# Patient Record
Sex: MALE | Race: WHITE | Employment: FULL TIME | URBAN - METROPOLITAN AREA
[De-identification: names, ages, dates, MRNs, and addresses within clinical notes are randomized per-mention and may not be internally consistent; named-entity substitution may affect disease eponyms.]

---

## 2018-01-03 PROCEDURE — 99233 SBSQ HOSP IP/OBS HIGH 50: CPT | Mod: GC | Performed by: PEDIATRICS

## 2018-12-22 ENCOUNTER — HOSPITAL ENCOUNTER (EMERGENCY)
Facility: CLINIC | Age: 33
Discharge: HOME | End: 2018-12-22
Attending: EMERGENCY MEDICINE | Admitting: EMERGENCY MEDICINE
Payer: COMMERCIAL

## 2018-12-22 VITALS
HEART RATE: 71 BPM | RESPIRATION RATE: 16 BRPM | WEIGHT: 225 LBS | BODY MASS INDEX: 31.83 KG/M2 | TEMPERATURE: 97.9 F | SYSTOLIC BLOOD PRESSURE: 147 MMHG | OXYGEN SATURATION: 97 % | DIASTOLIC BLOOD PRESSURE: 85 MMHG

## 2018-12-22 DIAGNOSIS — R21 RASH: ICD-10-CM

## 2018-12-22 DIAGNOSIS — R53.1 GENERALIZED WEAKNESS: ICD-10-CM

## 2018-12-22 DIAGNOSIS — S39.012A STRAIN OF LUMBAR REGION, INITIAL ENCOUNTER: ICD-10-CM

## 2018-12-22 DIAGNOSIS — D72.829 LEUKOCYTOSIS, UNSPECIFIED TYPE: ICD-10-CM

## 2018-12-22 LAB
ANION GAP SERPL CALCULATED.3IONS-SCNC: 7 MMOL/L (ref 3–14)
BASOPHILS # BLD AUTO: 0.1 10E9/L (ref 0–0.2)
BASOPHILS NFR BLD AUTO: 0.3 %
BUN SERPL-MCNC: 15 MG/DL (ref 7–30)
CALCIUM SERPL-MCNC: 8.8 MG/DL (ref 8.5–10.1)
CHLORIDE SERPL-SCNC: 102 MMOL/L (ref 94–109)
CO2 SERPL-SCNC: 23 MMOL/L (ref 20–32)
CREAT SERPL-MCNC: 0.86 MG/DL (ref 0.66–1.25)
DIFFERENTIAL METHOD BLD: ABNORMAL
EOSINOPHIL # BLD AUTO: 0.1 10E9/L (ref 0–0.7)
EOSINOPHIL NFR BLD AUTO: 0.5 %
ERYTHROCYTE [DISTWIDTH] IN BLOOD BY AUTOMATED COUNT: 12.8 % (ref 10–15)
FLUAV+FLUBV AG SPEC QL: NEGATIVE
FLUAV+FLUBV AG SPEC QL: NEGATIVE
GFR SERPL CREATININE-BSD FRML MDRD: >90 ML/MIN/{1.73_M2}
GLUCOSE SERPL-MCNC: 122 MG/DL (ref 70–99)
HCT VFR BLD AUTO: 46.2 % (ref 40–53)
HGB BLD-MCNC: 15.4 G/DL (ref 13.3–17.7)
IMM GRANULOCYTES # BLD: 0.1 10E9/L (ref 0–0.4)
IMM GRANULOCYTES NFR BLD: 0.5 %
LACTATE BLD-SCNC: 0.9 MMOL/L (ref 0.7–2)
LYMPHOCYTES # BLD AUTO: 2.2 10E9/L (ref 0.8–5.3)
LYMPHOCYTES NFR BLD AUTO: 11 %
MCH RBC QN AUTO: 28.2 PG (ref 26.5–33)
MCHC RBC AUTO-ENTMCNC: 33.3 G/DL (ref 31.5–36.5)
MCV RBC AUTO: 85 FL (ref 78–100)
MONOCYTES # BLD AUTO: 1.4 10E9/L (ref 0–1.3)
MONOCYTES NFR BLD AUTO: 6.7 %
NEUTROPHILS # BLD AUTO: 16.4 10E9/L (ref 1.6–8.3)
NEUTROPHILS NFR BLD AUTO: 81 %
NRBC # BLD AUTO: 0 10*3/UL
NRBC BLD AUTO-RTO: 0 /100
PLATELET # BLD AUTO: 278 10E9/L (ref 150–450)
POTASSIUM SERPL-SCNC: 3.8 MMOL/L (ref 3.4–5.3)
RBC # BLD AUTO: 5.46 10E12/L (ref 4.4–5.9)
SODIUM SERPL-SCNC: 132 MMOL/L (ref 133–144)
SPECIMEN SOURCE: NORMAL
WBC # BLD AUTO: 20.3 10E9/L (ref 4–11)

## 2018-12-22 PROCEDURE — 25000132 ZZH RX MED GY IP 250 OP 250 PS 637: Performed by: EMERGENCY MEDICINE

## 2018-12-22 PROCEDURE — 36415 COLL VENOUS BLD VENIPUNCTURE: CPT | Performed by: EMERGENCY MEDICINE

## 2018-12-22 PROCEDURE — 85025 COMPLETE CBC W/AUTO DIFF WBC: CPT | Performed by: EMERGENCY MEDICINE

## 2018-12-22 PROCEDURE — 87040 BLOOD CULTURE FOR BACTERIA: CPT | Performed by: EMERGENCY MEDICINE

## 2018-12-22 PROCEDURE — 83605 ASSAY OF LACTIC ACID: CPT | Performed by: EMERGENCY MEDICINE

## 2018-12-22 PROCEDURE — 80048 BASIC METABOLIC PNL TOTAL CA: CPT | Performed by: EMERGENCY MEDICINE

## 2018-12-22 PROCEDURE — 87804 INFLUENZA ASSAY W/OPTIC: CPT | Performed by: EMERGENCY MEDICINE

## 2018-12-22 PROCEDURE — 99283 EMERGENCY DEPT VISIT LOW MDM: CPT

## 2018-12-22 RX ORDER — LIDOCAINE 40 MG/G
CREAM TOPICAL
Status: DISCONTINUED | OUTPATIENT
Start: 2018-12-22 | End: 2018-12-22 | Stop reason: HOSPADM

## 2018-12-22 RX ORDER — IBUPROFEN 200 MG
400 TABLET ORAL ONCE
Status: COMPLETED | OUTPATIENT
Start: 2018-12-22 | End: 2018-12-22

## 2018-12-22 RX ORDER — AMOXICILLIN 500 MG/1
500 CAPSULE ORAL 2 TIMES DAILY
Status: ON HOLD | COMMUNITY
End: 2018-12-29

## 2018-12-22 RX ORDER — METHOCARBAMOL 500 MG/1
1000 TABLET, FILM COATED ORAL 3 TIMES DAILY PRN
Qty: 30 TABLET | Refills: 0 | Status: ON HOLD | OUTPATIENT
Start: 2018-12-22 | End: 2019-01-04

## 2018-12-22 RX ORDER — METHOCARBAMOL 750 MG/1
750 TABLET, FILM COATED ORAL ONCE
Status: COMPLETED | OUTPATIENT
Start: 2018-12-22 | End: 2018-12-22

## 2018-12-22 RX ADMIN — IBUPROFEN 400 MG: 200 TABLET, FILM COATED ORAL at 18:50

## 2018-12-22 RX ADMIN — METHOCARBAMOL 750 MG: 750 TABLET ORAL at 18:50

## 2018-12-22 ASSESSMENT — ENCOUNTER SYMPTOMS
SHORTNESS OF BREATH: 0
CHILLS: 1
WEAKNESS: 1
VOMITING: 0
TROUBLE SWALLOWING: 0
BACK PAIN: 1
MYALGIAS: 1
FEVER: 1
SORE THROAT: 1
ABDOMINAL PAIN: 0
NAUSEA: 0

## 2018-12-22 NOTE — ED AVS SNAPSHOT
[facility] Emergency Department  [address]  [facility] [address]  Phone:  [phone]  Fax:  [phone]                                    Jose Spears   MRN: [id number]    Department:  [facility] Emergency Department   Date of Visit:  12/22/2018           After Visit Summary Signature Page    I have received my discharge instructions, and my questions have been answered. I have discussed any challenges I see with this plan with the nurse or doctor.    ..........................................................................................................................................  Patient/Patient Representative Signature      ..........................................................................................................................................  Patient Representative Print Name and Relationship to Patient    ..................................................               ................................................  Date                                   Time    ..........................................................................................................................................  Reviewed by Signature/Title    ...................................................              ..............................................  Date                                               Time          22EPIC Rev 08/18

## 2018-12-22 NOTE — ED PROVIDER NOTES
History     Chief Complaint:  Fatigue & Back Pain    HPI   Jose Spears is a 33 year old male who presents with fatigue. The patient went to Urgent Care 8 days ago on 12/14 for hives and a sore throat. He tested negative for strep at that time and was given a 5 day course of steroids for the hives. The patient states he feels he never recovered from the symptoms for which he was seen at Urgent Care and currently complains of a sore throat, fevers, chills, generalized weakness, body aches and a return of the hives.     The patient also reports that he was pulling his shorts up after going to the bathroom recently and threw his back out. He currently complains of non-radiating low back pain that is dull in nature and gets worse with movement. He has been taking Tylenol for pain management and notes that his back pain is currently an 8/10 in severity.     Allergies:  Codeine  Morphine     Medications:    Amoxil  Tylenol PO    Past Medical History:    Osteogenesis imperfecta  External Hemorrhoids    Past Surgical History:    Orthopedic Surgery    Family History:    Osteogenesis imperfecta  Diabetes  Hypertension  Cerebrovascular Disease  Colorectal Cancer    Social History:  Smoking Status: Current Smoker  Alcohol Use: Occasionally  Patient presents with mom.  Marital Status:  Single     Review of Systems   Constitutional: Positive for chills and fever.   HENT: Positive for sore throat. Negative for trouble swallowing.    Respiratory: Negative for shortness of breath.    Gastrointestinal: Negative for abdominal pain, nausea and vomiting.   Musculoskeletal: Positive for back pain and myalgias.   Skin: Positive for rash.   Neurological: Positive for weakness (generalized).   All other systems reviewed and are negative.    Physical Exam   First Vitals:  BP: 147/85  Pulse: 71  Heart Rate: 89  Temp: 97.3  F (36.3  C)  Resp: 16  Weight: 102.1 kg (225 lb)  SpO2: 95 %      Physical Exam  General: The patient is alert,  in no respiratory distress.    HENT: Mucous membranes moist.    Cardiovascular: Regular rate and rhythm. Good pulses in all four extremities. Normal capillary refill and skin turgor.     Respiratory: Lungs are clear. No nasal flaring. No retractions. No wheezing, no crackles.    Gastrointestinal: Abdomen soft. No guarding, no rebound. No palpable hernias.     Musculoskeletal: No gross deformity.     Skin: No petechiae.  Warm, nonspecific papular erythema over left forearm.    Neurologic: The patient is alert and oriented x3. GCS 15. No testable cranial nerve deficit. Follows commands with clear and appropriate speech. Gives appropriate answers. Good strength in all extremities. No gross neurologic deficit. Gross sensation intact. Pupils are round and reactive. No meningismus.     Lymphatic: No cervical adenopathy. No lower extremity swelling.    Psychiatric: The patient is non-tearful.    Emergency Department Course     Laboratory:    1906: Lactic acid whole blood: 0.9    Influenza A/B antigen: Both negative    BMP: Na 132 (L), Glucose 122 (H), o/w AWNL (Creatinine 0.86)    CBC: WBC 20.3 (H), o/w AWNL (HGB 15.4, )    Blood Culture x2: Pending    Interventions:    1850: ibuprofen tablet 400 mg PO  1850: Robaxin 750 mg PO    Emergency Department Course:  Past medical records, nursing notes, and vitals reviewed.  1828: I performed an exam of the patient and obtained history, as documented above.     Labs were sent.    1938: I rechecked the patient. Findings and plan explained to the Patient. Patient discharged home with instructions regarding supportive care, medications, and reasons to return. The importance of close follow-up was reviewed.     Impression & Plan      Medical Decision Making:  The patient reports he doesn't feel well. He has pain in multiple areas and there has been a rash, which he did attribute to hives, but the patient had been previously evaluated mid-month and had been started on steroids.  I think he is still having some effects from that with the pain and symptoms of fatigue. His white count did come back elevated which I think is likely secondary to that, but I did check a lactic acid level which was normal. I sent off blood cultures. He does not have signs suggesting UTI. I do not find it likely he has meningitis or a blood born infection, but I am covering all of the bases by sending off the blood cultures. The patient is otherwise stable. He has injured his back by bending over as well. There is no focal bony tenderness to suggest discitis. I will refer him to [facility] and wrote him for muscle relaxant. Regarding the rash, I do not see any overt signs of hives, but did order him for steroids and given that the weekend and [place] is coming up, I told him to avoid using the steroids unless the rash worsens, otherwise use the muscle relaxant, ibuprofen and follow up with [facility] for [facility] as well as a primary care doctor for recheck. He will receive a call if his cultures grow something and he should return if he worsens. The patient is otherwise stable for discharge.     Diagnosis:    ICD-10-CM    1. Generalized weakness R53.1 CBC with platelets differential     Basic metabolic panel     Influenza A/B antigen     Lactic acid whole blood     Blood culture     Blood culture   2. Rash R21    3. Strain of lumbar region, initial encounter S39.012A    4. Leukocytosis, unspecified type D72.829        Disposition:  Discharged to home.    Discharge Medications:     Medication List      Started    methocarbamol 500 MG tablet  Commonly known as:  ROBAXIN  1,000 mg, Oral, 3 TIMES DAILY PRN              Cathie Srivastava  12/22/2018   [facility] EMERGENCY DEPARTMENT  I, Cathie Srivastava, am serving as a scribe at 6:18 PM on 12/22/2018 to document services personally performed by Jose Daniel Haile MD based on my observations and the provider's  statements to me.        Jose Daniel Haile MD  12/22/18 [id number]

## 2018-12-22 NOTE — ED TRIAGE NOTES
Pt c/o fatigue, chills, sore throat, body aches for 4 days.  Pt also threw out back today.  Pt on amoxicillin for root canal earlier this week.

## 2018-12-23 ENCOUNTER — HOSPITAL ENCOUNTER (INPATIENT)
Facility: CLINIC | Age: 33
LOS: 6 days | Discharge: HOME | DRG: 546 | End: 2018-12-29
Attending: EMERGENCY MEDICINE | Admitting: PSYCHIATRY & NEUROLOGY
Payer: COMMERCIAL

## 2018-12-23 ENCOUNTER — APPOINTMENT (OUTPATIENT)
Dept: CT IMAGING | Facility: CLINIC | Age: 33
DRG: 546 | End: 2018-12-23
Attending: EMERGENCY MEDICINE
Payer: COMMERCIAL

## 2018-12-23 ENCOUNTER — APPOINTMENT (OUTPATIENT)
Dept: GENERAL RADIOLOGY | Facility: CLINIC | Age: 33
DRG: 546 | End: 2018-12-23
Attending: EMERGENCY MEDICINE
Payer: COMMERCIAL

## 2018-12-23 ENCOUNTER — APPOINTMENT (OUTPATIENT)
Dept: MRI IMAGING | Facility: CLINIC | Age: 33
DRG: 546 | End: 2018-12-23
Attending: EMERGENCY MEDICINE
Payer: COMMERCIAL

## 2018-12-23 DIAGNOSIS — R21 RASH: Primary | ICD-10-CM

## 2018-12-23 DIAGNOSIS — R53.1 PROGRESSIVE FOCAL MOTOR WEAKNESS: ICD-10-CM

## 2018-12-23 LAB
ALBUMIN SERPL-MCNC: 3.4 G/DL (ref 3.4–5)
ALBUMIN UR-MCNC: NEGATIVE MG/DL
ALP SERPL-CCNC: 108 U/L (ref 40–150)
ALT SERPL W P-5'-P-CCNC: 63 U/L (ref 0–70)
AMPHETAMINES UR QL SCN: NEGATIVE
ANION GAP SERPL CALCULATED.3IONS-SCNC: 11 MMOL/L (ref 3–14)
APPEARANCE UR: CLEAR
AST SERPL W P-5'-P-CCNC: 27 U/L (ref 0–45)
BARBITURATES UR QL: NEGATIVE
BASOPHILS # BLD AUTO: 0 10E9/L (ref 0–0.2)
BASOPHILS NFR BLD AUTO: 0.1 %
BENZODIAZ UR QL: NEGATIVE
BILIRUB SERPL-MCNC: 1.1 MG/DL (ref 0.2–1.3)
BILIRUB UR QL STRIP: NEGATIVE
BUN SERPL-MCNC: 13 MG/DL (ref 7–30)
CALCIUM SERPL-MCNC: 8.9 MG/DL (ref 8.5–10.1)
CANNABINOIDS UR QL SCN: NEGATIVE
CHLORIDE SERPL-SCNC: 99 MMOL/L (ref 94–109)
CK SERPL-CCNC: 46 U/L (ref 30–300)
CO2 SERPL-SCNC: 23 MMOL/L (ref 20–32)
COCAINE UR QL: NEGATIVE
COLOR UR AUTO: YELLOW
CREAT SERPL-MCNC: 0.73 MG/DL (ref 0.66–1.25)
CREAT SERPL-MCNC: 0.8 MG/DL (ref 0.66–1.25)
DIFFERENTIAL METHOD BLD: ABNORMAL
EOSINOPHIL # BLD AUTO: 0.1 10E9/L (ref 0–0.7)
EOSINOPHIL NFR BLD AUTO: 0.6 %
ERYTHROCYTE [DISTWIDTH] IN BLOOD BY AUTOMATED COUNT: 13 % (ref 10–15)
ETHANOL SERPL-MCNC: <0.01 G/DL
ETHANOL UR QL SCN: NEGATIVE
GFR SERPL CREATININE-BSD FRML MDRD: >90 ML/MIN/{1.73_M2}
GFR SERPL CREATININE-BSD FRML MDRD: >90 ML/MIN/{1.73_M2}
GLUCOSE SERPL-MCNC: 100 MG/DL (ref 70–99)
GLUCOSE UR STRIP-MCNC: NEGATIVE MG/DL
HCT VFR BLD AUTO: 47.4 % (ref 40–53)
HETEROPH AB SER QL: NEGATIVE
HGB BLD-MCNC: 15.9 G/DL (ref 13.3–17.7)
HGB UR QL STRIP: NEGATIVE
IMM GRANULOCYTES # BLD: 0 10E9/L (ref 0–0.4)
IMM GRANULOCYTES NFR BLD: 0.2 %
KETONES UR STRIP-MCNC: NEGATIVE MG/DL
LACTATE BLD-SCNC: 1.7 MMOL/L (ref 0.7–2)
LEUKOCYTE ESTERASE UR QL STRIP: NEGATIVE
LYMPHOCYTES # BLD AUTO: 1.4 10E9/L (ref 0.8–5.3)
LYMPHOCYTES NFR BLD AUTO: 7.4 %
MAGNESIUM SERPL-MCNC: 2.1 MG/DL (ref 1.6–2.3)
MCH RBC QN AUTO: 29 PG (ref 26.5–33)
MCHC RBC AUTO-ENTMCNC: 33.5 G/DL (ref 31.5–36.5)
MCV RBC AUTO: 86 FL (ref 78–100)
MONOCYTES # BLD AUTO: 1.2 10E9/L (ref 0–1.3)
MONOCYTES NFR BLD AUTO: 6.5 %
NEUTROPHILS # BLD AUTO: 15.7 10E9/L (ref 1.6–8.3)
NEUTROPHILS NFR BLD AUTO: 85.2 %
NITRATE UR QL: NEGATIVE
NRBC # BLD AUTO: 0 10*3/UL
NRBC BLD AUTO-RTO: 0 /100
OPIATES UR QL SCN: NEGATIVE
PH UR STRIP: 6 PH (ref 5–7)
PHOSPHATE SERPL-MCNC: 2.5 MG/DL (ref 2.5–4.5)
PLATELET # BLD AUTO: 216 10E9/L (ref 150–450)
PLATELET # BLD AUTO: 250 10E9/L (ref 150–450)
POTASSIUM SERPL-SCNC: 3.5 MMOL/L (ref 3.4–5.3)
PROT SERPL-MCNC: 7.9 G/DL (ref 6.8–8.8)
RBC # BLD AUTO: 5.49 10E12/L (ref 4.4–5.9)
RBC #/AREA URNS AUTO: 1 /HPF (ref 0–2)
SODIUM SERPL-SCNC: 132 MMOL/L (ref 133–144)
SOURCE: NORMAL
SP GR UR STRIP: 1 (ref 1–1.03)
TSH SERPL DL<=0.005 MIU/L-ACNC: 2.08 MU/L (ref 0.4–4)
UROBILINOGEN UR STRIP-MCNC: 2 MG/DL (ref 0–2)
WBC # BLD AUTO: 18.4 10E9/L (ref 4–11)
WBC #/AREA URNS AUTO: 1 /HPF (ref 0–5)

## 2018-12-23 PROCEDURE — 81001 URINALYSIS AUTO W/SCOPE: CPT | Performed by: EMERGENCY MEDICINE

## 2018-12-23 PROCEDURE — 25500064 ZZH RX 255 OP 636: Performed by: RADIOLOGY

## 2018-12-23 PROCEDURE — 12000001 ZZH R&B MED SURG/OB UMMC

## 2018-12-23 PROCEDURE — 25000128 H RX IP 250 OP 636: Performed by: STUDENT IN AN ORGANIZED HEALTH CARE EDUCATION/TRAINING PROGRAM

## 2018-12-23 PROCEDURE — 80320 DRUG SCREEN QUANTALCOHOLS: CPT | Performed by: EMERGENCY MEDICINE

## 2018-12-23 PROCEDURE — 25000128 H RX IP 250 OP 636: Performed by: EMERGENCY MEDICINE

## 2018-12-23 PROCEDURE — 99285 EMERGENCY DEPT VISIT HI MDM: CPT | Mod: 25 | Performed by: EMERGENCY MEDICINE

## 2018-12-23 PROCEDURE — A9585 GADOBUTROL INJECTION: HCPCS | Performed by: RADIOLOGY

## 2018-12-23 PROCEDURE — 83735 ASSAY OF MAGNESIUM: CPT | Performed by: EMERGENCY MEDICINE

## 2018-12-23 PROCEDURE — 82784 ASSAY IGA/IGD/IGG/IGM EACH: CPT | Performed by: STUDENT IN AN ORGANIZED HEALTH CARE EDUCATION/TRAINING PROGRAM

## 2018-12-23 PROCEDURE — 70450 CT HEAD/BRAIN W/O DYE: CPT

## 2018-12-23 PROCEDURE — 86308 HETEROPHILE ANTIBODY SCREEN: CPT | Performed by: EMERGENCY MEDICINE

## 2018-12-23 PROCEDURE — 96375 TX/PRO/DX INJ NEW DRUG ADDON: CPT | Performed by: EMERGENCY MEDICINE

## 2018-12-23 PROCEDURE — 86431 RHEUMATOID FACTOR QUANT: CPT | Performed by: STUDENT IN AN ORGANIZED HEALTH CARE EDUCATION/TRAINING PROGRAM

## 2018-12-23 PROCEDURE — 85049 AUTOMATED PLATELET COUNT: CPT | Performed by: STUDENT IN AN ORGANIZED HEALTH CARE EDUCATION/TRAINING PROGRAM

## 2018-12-23 PROCEDURE — 85025 COMPLETE CBC W/AUTO DIFF WBC: CPT | Performed by: EMERGENCY MEDICINE

## 2018-12-23 PROCEDURE — 72158 MRI LUMBAR SPINE W/O & W/DYE: CPT

## 2018-12-23 PROCEDURE — 93005 ELECTROCARDIOGRAM TRACING: CPT

## 2018-12-23 PROCEDURE — 86334 IMMUNOFIX E-PHORESIS SERUM: CPT | Performed by: STUDENT IN AN ORGANIZED HEALTH CARE EDUCATION/TRAINING PROGRAM

## 2018-12-23 PROCEDURE — 80053 COMPREHEN METABOLIC PANEL: CPT | Performed by: EMERGENCY MEDICINE

## 2018-12-23 PROCEDURE — 36415 COLL VENOUS BLD VENIPUNCTURE: CPT | Performed by: EMERGENCY MEDICINE

## 2018-12-23 PROCEDURE — 87040 BLOOD CULTURE FOR BACTERIA: CPT | Performed by: EMERGENCY MEDICINE

## 2018-12-23 PROCEDURE — 25000132 ZZH RX MED GY IP 250 OP 250 PS 637: Performed by: EMERGENCY MEDICINE

## 2018-12-23 PROCEDURE — 84100 ASSAY OF PHOSPHORUS: CPT | Performed by: EMERGENCY MEDICINE

## 2018-12-23 PROCEDURE — 36415 COLL VENOUS BLD VENIPUNCTURE: CPT | Performed by: STUDENT IN AN ORGANIZED HEALTH CARE EDUCATION/TRAINING PROGRAM

## 2018-12-23 PROCEDURE — 80307 DRUG TEST PRSMV CHEM ANLYZR: CPT | Performed by: EMERGENCY MEDICINE

## 2018-12-23 PROCEDURE — 71046 X-RAY EXAM CHEST 2 VIEWS: CPT

## 2018-12-23 PROCEDURE — 82550 ASSAY OF CK (CPK): CPT | Performed by: EMERGENCY MEDICINE

## 2018-12-23 PROCEDURE — 82565 ASSAY OF CREATININE: CPT | Performed by: STUDENT IN AN ORGANIZED HEALTH CARE EDUCATION/TRAINING PROGRAM

## 2018-12-23 PROCEDURE — 93010 ELECTROCARDIOGRAM REPORT: CPT | Performed by: INTERNAL MEDICINE

## 2018-12-23 PROCEDURE — 84443 ASSAY THYROID STIM HORMONE: CPT | Performed by: EMERGENCY MEDICINE

## 2018-12-23 PROCEDURE — 96361 HYDRATE IV INFUSION ADD-ON: CPT | Performed by: EMERGENCY MEDICINE

## 2018-12-23 PROCEDURE — 99285 EMERGENCY DEPT VISIT HI MDM: CPT | Mod: Z6 | Performed by: EMERGENCY MEDICINE

## 2018-12-23 PROCEDURE — 96365 THER/PROPH/DIAG IV INF INIT: CPT | Mod: 59 | Performed by: EMERGENCY MEDICINE

## 2018-12-23 PROCEDURE — 83605 ASSAY OF LACTIC ACID: CPT | Performed by: EMERGENCY MEDICINE

## 2018-12-23 RX ORDER — AMOXICILLIN 250 MG
1 CAPSULE ORAL 2 TIMES DAILY PRN
Status: DISCONTINUED | OUTPATIENT
Start: 2018-12-23 | End: 2018-12-29 | Stop reason: HOSPADM

## 2018-12-23 RX ORDER — LIDOCAINE HYDROCHLORIDE AND EPINEPHRINE 10; 10 MG/ML; UG/ML
1 INJECTION, SOLUTION INFILTRATION; PERINEURAL ONCE
Status: DISCONTINUED | OUTPATIENT
Start: 2018-12-23 | End: 2018-12-23 | Stop reason: CLARIF

## 2018-12-23 RX ORDER — NALOXONE HYDROCHLORIDE 0.4 MG/ML
.1-.4 INJECTION, SOLUTION INTRAMUSCULAR; INTRAVENOUS; SUBCUTANEOUS
Status: DISCONTINUED | OUTPATIENT
Start: 2018-12-23 | End: 2018-12-29 | Stop reason: HOSPADM

## 2018-12-23 RX ORDER — ACETAMINOPHEN 325 MG/1
650 TABLET ORAL EVERY 4 HOURS PRN
Status: DISCONTINUED | OUTPATIENT
Start: 2018-12-23 | End: 2018-12-23

## 2018-12-23 RX ORDER — ONDANSETRON 2 MG/ML
4 INJECTION INTRAMUSCULAR; INTRAVENOUS EVERY 6 HOURS PRN
Status: DISCONTINUED | OUTPATIENT
Start: 2018-12-23 | End: 2018-12-29 | Stop reason: HOSPADM

## 2018-12-23 RX ORDER — ACETAMINOPHEN 325 MG/1
650 TABLET ORAL EVERY 4 HOURS PRN
Status: DISCONTINUED | OUTPATIENT
Start: 2018-12-23 | End: 2018-12-29 | Stop reason: HOSPADM

## 2018-12-23 RX ORDER — SODIUM CHLORIDE 9 MG/ML
1000 INJECTION, SOLUTION INTRAVENOUS CONTINUOUS
Status: DISCONTINUED | OUTPATIENT
Start: 2018-12-23 | End: 2018-12-23

## 2018-12-23 RX ORDER — AMOXICILLIN 250 MG
2 CAPSULE ORAL 2 TIMES DAILY PRN
Status: DISCONTINUED | OUTPATIENT
Start: 2018-12-23 | End: 2018-12-29 | Stop reason: HOSPADM

## 2018-12-23 RX ORDER — GADOBUTROL 604.72 MG/ML
0.1 INJECTION INTRAVENOUS ONCE
Status: COMPLETED | OUTPATIENT
Start: 2018-12-23 | End: 2018-12-23

## 2018-12-23 RX ORDER — LORAZEPAM 2 MG/ML
1 INJECTION INTRAMUSCULAR ONCE
Status: COMPLETED | OUTPATIENT
Start: 2018-12-23 | End: 2018-12-23

## 2018-12-23 RX ORDER — CEFTRIAXONE 2 G/1
2 INJECTION, POWDER, FOR SOLUTION INTRAMUSCULAR; INTRAVENOUS ONCE
Status: COMPLETED | OUTPATIENT
Start: 2018-12-23 | End: 2018-12-23

## 2018-12-23 RX ORDER — ONDANSETRON 4 MG/1
4 TABLET, ORALLY DISINTEGRATING ORAL EVERY 6 HOURS PRN
Status: DISCONTINUED | OUTPATIENT
Start: 2018-12-23 | End: 2018-12-29 | Stop reason: HOSPADM

## 2018-12-23 RX ORDER — POLYETHYLENE GLYCOL 3350 17 G/17G
17 POWDER, FOR SOLUTION ORAL DAILY PRN
Status: DISCONTINUED | OUTPATIENT
Start: 2018-12-23 | End: 2018-12-29 | Stop reason: HOSPADM

## 2018-12-23 RX ORDER — ACETAMINOPHEN 325 MG/1
650 TABLET ORAL ONCE
Status: COMPLETED | OUTPATIENT
Start: 2018-12-23 | End: 2018-12-23

## 2018-12-23 RX ORDER — IBUPROFEN 600 MG/1
600 TABLET, FILM COATED ORAL ONCE
Status: COMPLETED | OUTPATIENT
Start: 2018-12-23 | End: 2018-12-23

## 2018-12-23 RX ADMIN — VANCOMYCIN HYDROCHLORIDE 2500 MG: 1 INJECTION, POWDER, LYOPHILIZED, FOR SOLUTION INTRAVENOUS at 23:57

## 2018-12-23 RX ADMIN — GADOBUTROL 10 ML: 604.72 INJECTION INTRAVENOUS at 18:14

## 2018-12-23 RX ADMIN — CEFTRIAXONE SODIUM 2 G: 2 INJECTION, POWDER, FOR SOLUTION INTRAMUSCULAR; INTRAVENOUS at 20:50

## 2018-12-23 RX ADMIN — SODIUM CHLORIDE 1000 ML: 9 INJECTION, SOLUTION INTRAVENOUS at 16:59

## 2018-12-23 RX ADMIN — SODIUM CHLORIDE 1000 ML: 9 INJECTION, SOLUTION INTRAVENOUS at 12:08

## 2018-12-23 RX ADMIN — ACETAMINOPHEN 650 MG: 325 TABLET, FILM COATED ORAL at 13:44

## 2018-12-23 RX ADMIN — IBUPROFEN 600 MG: 600 TABLET ORAL at 19:27

## 2018-12-23 RX ADMIN — ACYCLOVIR SODIUM 1000 MG: 1000 INJECTION, SOLUTION INTRAVENOUS at 21:50

## 2018-12-23 RX ADMIN — LORAZEPAM 1 MG: 2 INJECTION, SOLUTION INTRAMUSCULAR; INTRAVENOUS at 17:53

## 2018-12-23 RX ADMIN — ACETAMINOPHEN 650 MG: 325 TABLET, FILM COATED ORAL at 16:59

## 2018-12-23 RX ADMIN — ENOXAPARIN SODIUM 40 MG: 100 INJECTION SUBCUTANEOUS at 23:57

## 2018-12-23 ASSESSMENT — ENCOUNTER SYMPTOMS
DIAPHORESIS: 1
FATIGUE: 1
NUMBNESS: 1
BACK PAIN: 1
MYALGIAS: 1
CONFUSION: 0
VOMITING: 0
DIARRHEA: 0
WEAKNESS: 1
NAUSEA: 0
LIGHT-HEADEDNESS: 1
SORE THROAT: 1
RHINORRHEA: 0
JOINT SWELLING: 0
BLOOD IN STOOL: 0
NECK STIFFNESS: 1
SPEECH DIFFICULTY: 0
COUGH: 0
ARTHRALGIAS: 0
HEADACHES: 1
ABDOMINAL PAIN: 0
ABDOMINAL DISTENTION: 0
DYSURIA: 0
NECK PAIN: 1
FEVER: 1
SHORTNESS OF BREATH: 0

## 2018-12-23 ASSESSMENT — MIFFLIN-ST. JEOR: SCORE: 2008.59

## 2018-12-23 NOTE — ED TRIAGE NOTES
"ED TRIAGE    Medical / Trauma C/o:  33-yr male patient - presenting to ED for eval of generalized weakness / joint pain, fevers; x 3 days; also \"threw out\" his back yesterday, while getting dressed.  Ambulating is difficult for patient.    Duration of C/o:  3 days    Contributing Factors / Concerning HX:  See HX    Significant Med's / Tx's:  See med's    Febrile / Afebrile:  99.7F, at triage    Patient Vitals for the past 24 hrs:   BP Temp Temp src Pulse Heart Rate Resp SpO2 Height Weight   12/23/18 1107 120/71 99.7  F (37.6  C) Oral 79 79 18 97 % 1.829 m (6') 102.1 kg (225 lb)       Javier Lobo  December 23, 2018  11:09 AM  "
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
## 2018-12-23 NOTE — ED NOTES
[facility], [place]   ED Nurse to Floor Handoff     Jose Spears is a 33 year old male who speaks English and lives alone,  in a home  They arrived in the ED by car from home    ED Chief Complaint: Generalized Weakness (x 3, ,febrile); Gait Problem (x 3); and Back Pain    ED Dx;   Final diagnoses:   Progressive focal motor weakness         Needed?: No    Allergies:   Allergies   Allergen Reactions     Codeine Nausea and Vomiting     Morphine Nausea and Vomiting   .  Past Medical Hx:   Past Medical History:   Diagnosis Date     Osteogenesis imperfecta       Baseline Mental status: WDL  Current Mental Status changes: at basesline    Infection present or suspected this encounter: cultures pending  Sepsis suspected: No  Isolation type: No active isolations     Activity level - Baseline/Home:  Independent  Activity Level - Current:   Stand with Assist    Bariatric equipment needed?: No    In the ED these meds were given:   Medications   0.9% sodium chloride BOLUS (0 mLs Intravenous Stopped 12/23/18 1355)     Followed by   sodium chloride 0.9% infusion (not administered)   acetaminophen (TYLENOL) tablet 650 mg (650 mg Oral Given 12/23/18 1344)   lidocaine 1% with EPINEPHrine 1:100,000 injection 1 mL (not administered)       Drips running?  No    Home pump  No    Current LDAs  Peripheral IV 12/23/18 Right Upper forearm (Active)   Site Assessment WDL 12/23/2018 12:01 PM   Line Status Saline locked 12/23/2018 12:01 PM   Phlebitis Scale 0-->no symptoms 12/23/2018 12:01 PM   Infiltration Scale 0 12/23/2018 12:01 PM   Number of days: 0       Labs results:   Labs Ordered and Resulted from Time of ED Arrival Up to the Time of Departure from the ED   CBC WITH PLATELETS DIFFERENTIAL - Abnormal; Notable for the following components:       Result Value    WBC 18.4 (*)     Absolute Neutrophil 15.7 (*)     All other components within normal limits   COMPREHENSIVE METABOLIC PANEL - Abnormal;  Notable for the following components:    Sodium 132 (*)     Glucose 100 (*)     All other components within normal limits   ROUTINE UA WITH MICROSCOPIC REFLEX TO CULTURE   TSH WITH FREE T4 REFLEX   CK TOTAL   MAGNESIUM   PHOSPHORUS   LACTIC ACID WHOLE BLOOD   MONONUCLEOSIS SCREEN   OLIGOCLONAL BANDING   PROTEIN IMMUNOFIXATION SERUM   VASCULITIS PANEL   SSA RO ISAAC ANTIBODY IGG   SSB LA ISAAC ANTIBODY IGG   ANTI NUCLEAR DANIA IGG BY IFA WITH REFLEX   RHEUMATOID FACTOR   GANGLIOSIDE ANTIBODIES IGG AND IGM   PERIPHERAL IV CATHETER   BLOOD CULTURE   BLOOD CULTURE   GLUCOSE CSF   PROTEIN TOTAL CSF   GRAM STAIN   CSF CULTURE AEROBIC BACTERIAL   CELL COUNT WITH DIFFERENTIAL CSF   VDRL CSF   ANAEROBIC CSF CULTURE   ENTEROVIRUS PCR CSF   HSV TYPES 1 AND 2 QUALITATIVE PCR CSF   H INFLUENZAE ANTIGEN   LYME IGG AND IGM CSF IMMUNOBLOT   VARICELLA ZOSTER DNA PCR CSF OR SKIN SWAB       Imaging Studies: No results found for this or any previous visit (from the past 24 hour(s)).    Recent vital signs:   /76   Pulse 83   Temp 100  F (37.8  C)   Resp 18   Ht 1.829 m (6')   Wt 102.1 kg (225 lb)   SpO2 96%   BMI 30.52 kg/m      Cardiac Rhythm: Normal Sinus  Pt needs tele? No  Skin/wound Issues: None    Code Status: not on file    Pain control: fair    Nausea control: pt had none    Abnormal labs/tests/findings requiring intervention:     Family present during ED course? Yes   Family Comments/Social Situation comments: mother at bedside    Tasks needing completion: None    Marshall Gomez, RN  [phone] [facility]

## 2018-12-23 NOTE — ED NOTES
"Mother of patient unhappy with care. Mother of patient walking up to HUC desk stating \" My son is sick and nobody is helping him\". Patient watching TV at this time VSS A&Ox4. DR Rangel aware of situation.     "

## 2018-12-23 NOTE — H&P
"[facility]  General Neurology Admission  12/23/2018      Jose Spears MRN# [id number]   YOB: 1985 Age: 33 year old      Date of Admission:  12/23/2018    Primary care provider:   No Ref-Primary, Physician           History of Present Illness:       CC: generalized weakness    History is obtained from the patient and/or family and medical record.      Jose Spears is a 33 year old male with history of osteogenesis imperfecta who presented to the emergency department with generalized weakness, gait instability, back pain.  Neurology is consulted for assistance and workup.  History is obtained from chart review, patient, patient's family.    Mr. Spears reports that he had sudden onset of generalized weakness on Wednesday morning after waking up.  He does not describe that it started at one region of his body, rather states that he had weakness throughout his body immediately upon awakening.  In addition to this he was very sore throughout.  He cannot state whether the weakness and soreness started distally and progressed up his extremities, rather stating that it was \"all over.\"  Since onset he reports that he has had worsening of both the pain in the weakness, to the point where he cannot walk today and he is needing to pick his legs up with his arms to get out of bed.    Of note, he was seen in urgent care for your urticaria and was started on prednisone.  The course of this medication ended on the day that he developed his weakness.    Review of systems reveals complaints of dark urine, mild headache, dizziness, sore throat, neck stiffness, and gait instability, diaphoresis, and fever.  Is unclear whether or not he has true numbness he at one point stated he did and then at other said he did not.    He was seen in the [facility] emergency department 12/22 for evaluation of fatigue and was written a prescription for muscle relaxant due to back pain.        "    Past Medical History:     Past Medical History:   Diagnosis Date     Osteogenesis imperfecta       Past Surgical History:   Procedure Laterality Date     ORTHOPEDIC SURGERY                Social History:     Social History     Socioeconomic History     Marital status: Single     Spouse name: Not on file     Number of children: Not on file     Years of education: Not on file     Highest education level: Not on file   Social Needs     Financial resource strain: Not on file     Food insecurity - worry: Not on file     Food insecurity - inability: Not on file     Transportation needs - medical: Not on file     Transportation needs - non-medical: Not on file   Occupational History     Not on file   Tobacco Use     Smoking status: Current Every Day Smoker     Packs/day: 1.00     Types: Cigarettes     Smokeless tobacco: Never Used   Substance and Sexual Activity     Alcohol use: Yes     Comment: occasionally     Drug use: No     Sexual activity: Yes     Partners: Female   Other Topics Concern     Parent/sibling w/ CABG, MI or angioplasty before 65F 55M? No   Social History Narrative     Not on file             Family History:     Family History   Problem Relation Age of Onset     Genetic Disorder Father         OI     Diabetes Maternal Grandmother      Hypertension Maternal Grandmother      Cerebrovascular Disease Maternal Grandmother      Diabetes Maternal Aunt      Cancer - colorectal Maternal Grandfather             Allergies:      Allergies   Allergen Reactions     Codeine Nausea and Vomiting     Morphine Nausea and Vomiting             Medications:     Prescription Medications as of 12/23/2018       Rx Number Disp Refills Start End Last Dispensed Date Next Fill Date Owning Pharmacy    Acetaminophen (TYLENOL PO)            Class: Historical    Route: Oral    amoxicillin (AMOXIL) 500 MG capsule            Sig: Take 500 mg by mouth 2 times daily    Class: Historical    Route: Oral    methocarbamol (ROBAXIN) 500 MG  "tablet  30 tablet 0 2018       Sig: Take 2 tablets (1,000 mg) by mouth 3 times daily as needed    Class: Local Print    Route: Oral      Hospital Medications as of 2018       Dose Frequency Start End    0.9% sodium chloride BOLUS 1,000 mL ONCE 2018    Sig: Inject 1,000 mLs into the vein once    Class: E-Prescribe    Route: Intravenous    Linked Group 1:  \"Followed by\" Linked Group Details        acetaminophen (TYLENOL) tablet 650 mg 650 mg EVERY 4 HOURS PRN 2018     Sig: Take 2 tablets (650 mg) by mouth every 4 hours as needed for mild pain or fever    Class: E-Prescribe    Notes to Pharmacy: DO NOT USE THIS FIELD FOR ADMIN INSTRUCTIONS; INFORMATION DOES NOT SHOW ON MAR. USE THE FIELD ABOVE MARKED ADMIN INSTRUCTIONS    Route: Oral    ibuprofen (ADVIL/MOTRIN) tablet 400 mg 400 mg ONCE 2018    Sig: Take 2 tablets (400 mg) by mouth once    Class: E-Prescribe    Route: Oral    methocarbamol (ROBAXIN) tablet 750 mg 750 mg ONCE 2018    Sig: Take 1 tablet (750 mg) by mouth once    Class: E-Prescribe    Route: Oral    sodium chloride 0.9% infusion 1,000 mL CONTINUOUS 2018     Sig: Inject 1,000 mLs into the vein continuous    Class: E-Prescribe    Route: Intravenous    Linked Group 1:  \"Followed by\" Linked Group Details        0.9% sodium chloride BOLUS (Discontinued) 1,000 mL ONCE 2018    Sig: Inject 1,000 mLs into the vein once    Class: E-Prescribe    Route: Intravenous    lidocaine (LMX4) cream (Discontinued)  EVERY 1 HOUR PRN 2018    Sig: Apply topically every hour as needed for pain (with VAD insertion or accessing implanted port.)    Class: E-Prescribe    Route: Topical    Reason for Discontinue: Patient Discharge    lidocaine 1 % 1 mL (Discontinued) 1 mL EVERY 1 HOUR PRN 2018    Si mL by Other route every hour as needed (mild pain with VAD insertion or accessing implanted port) "    Class: E-Prescribe    Route: Other    Reason for Discontinue: Patient Discharge    sodium chloride (PF) 0.9% PF flush 3 mL (Discontinued) 3 mL EVERY 1 HOUR PRN 12/22/2018 12/22/2018    Sig: 3 mLs by Intracatheter route every hour as needed for line flush (for peripheral IV flush post IV meds)    Class: E-Prescribe    Route: Intracatheter    Reason for Discontinue: Patient Discharge    sodium chloride (PF) 0.9% PF flush 3 mL (Discontinued) 3 mL EVERY 8 HOURS 12/22/2018 12/22/2018    Sig: 3 mLs by Intracatheter route every 8 hours    Class: E-Prescribe    Route: Intracatheter    Reason for Discontinue: Patient Discharge                Review of Systems:   The 10 point Review of Systems is negative other than noted in the HPI         Physical Exam:   /76   Pulse 83   Temp 100  F (37.8  C)   Resp 18   Ht 1.829 m (6')   Wt 102.1 kg (225 lb)   SpO2 96%   BMI 30.52 kg/m       General: NAD, cooperative  HEENT: sclera anicteric  Resp: CTAB, no w/r/r  CVS: RRR, no m/r/g  Skin: warm and dry  Extremities: No edema    Neurologic:  Mental Status: Fully alert, attentive and oriented. Speech fluent without errors.   Cranial Nerves: Visual fields intact. PERRL. EOMI with normal smooth pursuit. Facial movements symmetric. Hearing intact to conversation. Palate elevation symmetric, uvula midline. No dysarthria. Shoulder shrug strong bilaterally. Tongue protrusion midline.  Motor: No abnormal movements. Normal tone throughout.    LUE: 4+/5 shoulder abductors, tricpes, 4/5 biceps and wrist extensors and finger abudctors   RUE: 4+/5 shoulder abductors, biceps, triceps; 4/5 wrist extensors, finger abudcotrs   LLE: 4-/5 hip flexors, knee flexors, knee extensors; 4/5 dorsiflexors, plantarflexors   RLE: 4/5 hip flexors, knee extensors; 4+/5 knee flexors, dorsiflexors, plantarflexors  Reflexes: 2+ right biceps, brachioradialis, patella; 2+ left patella; 1+ left biceps, brachioradialis; absent ankle jerk bilaterally.  Toes  mute  Sensory: Diminished cold temperature sensation to mid-calf bilaterally  Coordination: FNF intact without ataxia or dysmetria. HS limited by weakness  Station/Gait: Attempted but he was too imbalanced and did not wish to proceed            Data:   CBC:  Lab Results   Component Value Date    WBC 18.4 12/23/2018     Lab Results   Component Value Date    HGB 15.9 12/23/2018     Lab Results   Component Value Date    HCT 47.4 12/23/2018     Lab Results   Component Value Date     12/23/2018       Last Basic Metabolic Panel:  Lab Results   Component Value Date     12/23/2018      Lab Results   Component Value Date    POTASSIUM 3.5 12/23/2018     Lab Results   Component Value Date    CHLORIDE 99 12/23/2018     Lab Results   Component Value Date    MARIANNE 8.9 12/23/2018     Lab Results   Component Value Date    CO2 23 12/23/2018     Lab Results   Component Value Date    BUN 13 12/23/2018     Lab Results   Component Value Date    CR 0.73 12/23/2018     Lab Results   Component Value Date     12/23/2018            Assessment and Plan:   Jose Spears is a 33 year old male with history of osteogenesis imperfecta who presents with weakness and myalgias and question of numbness.  Exam was concerning for asymmetric reflexes with diminished reflexes in the ankles, left patella, left biceps and brachioradialis.  Unclear etiology of presenting symptoms, although constellation of symptoms is concerning for possible Guyon Barré syndrome.    #Concern for AIDP:  -Lumbar puncture in the ED, studies to send include cell count with differential, glucose, protein, oligoclonal bands, viral studies  -Should complete MRI of his L-spine with and without contrast in the ED  -Monitor NIF/FVC twice daily  -If lumbar puncture does not look infectious he should be started on IVIG tonight with plan of 0.4 g/kg x 5 days  -He should have nerve conduction studies performed this week per Dr. Abreu    #Back pain:  We will hold  methocarbamol for the time being    #History of osteogenesis imperfecta    Code: Full  FEN: Regular adult diet  Ppx: SCDs, Lovenox  Dispo: Pending clinical course, anticipate at least 5 days of hospitalization for IVIG therapy    Patient discussed with attending physician Dr. Abreu.    Nakul Elizalde  PGY-3 Neurology      Neurology Staff Addendum  I have seen and evaluated the patient on 12-24 and discussed with the resident team and agree with the documentation.  See progress note from same day.  Of note patient refuses LP.        SULAIMAN RIVERS MD

## 2018-12-23 NOTE — LETTER
December 29, 2018      Jose Spears  [address] [address] [address]        To Whom It May Concern:    Jose Spears  was hospitalized at [facility] from 12/13 - 12/29.  Please excuse him from work until he can be seen by his new Primary Care Physician next week to determine if further restrictions are needed.         Sincerely,        Mary Lancaster MD   Internal Medicine & Pediatrics Hospitalist

## 2018-12-23 NOTE — DISCHARGE INSTRUCTIONS
Discharge Instructions  Back Pain  You were seen today for back pain. Back pain can have many causes, but most will get better without surgery or other specific treatment. Sometimes there is a herniated (?slipped?) disc. We don?t usually do MRI scans to look for these right away, since most herniated discs will get better on their own with time.  Today, we did not find any evidence that your back pain was caused by a serious condition, such as an infection, fracture, or tumor. However, sometimes symptoms develop over time and cannot be found during an emergency visit, so it is very important that you follow up with your primary doctor.  Return to the Emergency Department if:  You develop a fever with your back pain.   You have weakness or change in sensation in one or both legs.  You lose control of your bowels or bladder, or can?t empty your bladder.  Your pain gets much worse.     Follow-up with your doctor:  Unless your pain has completely gone away, please make an appointment with your doctor within one week.  You may need further management of your back pain, such as more pain medication, imaging such as an X-ray or MRI, or physical therapy.    What can I do to help myself?  Remain Active -- People are often afraid that they will hurt their back further or delay recovery by remaining active, but this is one of the best things you can do for your back. In fact, prolonged bed rest is not recommended. Studies have shown that people with low back pain recover faster when they remain active. Movement helps to bring blood flow to the muscles and relieve muscle spasms as well as preventing loss of muscle strength.  Heat -- Using a heating pad can help with low back pain during the first few weeks. Do not sleep with a heating pad, as you can be burned.   Pain medications - You may take a pain medication such as Tylenol  (acetaminophen), Advil , Nuprin  (ibuprofen) or Aleve  (naproxen).  If you have been given a  narcotic such as Vicodin  (hydrocodone with acetaminophen), Percocet  (oxycodone with acetaminophen), codeine, or a muscle relaxant such as Flexeril  (cyclobenzaprine) or Soma  (carisoprodol), do not drive for four hours after you have taken it. If the narcotic contains Tylenol  (acetaminophen), do not take Tylenol  with it. All narcotics will cause constipation, so eat a high fiber diet.   If you were given a prescription for medicine here today, be sure to read all of the information (including the package insert) that comes with your prescription.  This will include important information about the medicine, its side effects, and any warnings that you need to know about.  The pharmacist who fills the prescription can provide more information and answer questions you may have about the medicine.  If you have questions or concerns that the pharmacist cannot address, please call or return to the Emergency Department.   Opioid Medication Information    Pain medications are among the most commonly prescribed medicines, so we are including this information for all our patients. If you did not receive pain medication or get a prescription for pain medicine, you can ignore it.     You may have been given a prescription for an opioid (narcotic) pain medicine and/or have received a pain medicine while here in the Emergency Department. These medicines can make you drowsy or impaired. You must not drive, operate dangerous equipment, or engage in any other dangerous activities while taking these medications. If you drive while taking these medications, you could be arrested for DUI, or driving under the influence. Do not drink any alcohol while you are taking these medications.     Opioid pain medications can cause addiction. If you have a history of chemical dependency of any type, you are at a higher risk of becoming addicted to pain medications.  Only take these prescribed medications to treat your pain when all other  options have been tried. Take it for as short a time and as few doses as possible. Store your pain pills in a secure place, as they are frequently stolen and provide a dangerous opportunity for children or visitors in your house to start abusing these powerful medications. We will not replace any lost or stolen medicine.  As soon as your pain is better, you should flush all your remaining medication.     Many prescription pain medications contain Tylenol  (acetaminophen), including Vicodin , Tylenol #3 , Norco , Lortab , and Percocet .  You should not take any extra pills of Tylenol  if you are using these prescription medications or you can get very sick.  Do not ever take more than 3000 mg of acetaminophen in any 24 hour period.    All opioids tend to cause constipation. Drink plenty of water and eat foods that have a lot of fiber, such as fruits, vegetables, prune juice, apple juice and high fiber cereal.  Take a laxative if you don?t move your bowels at least every other day. Miralax , Milk of Magnesia, Colace , or Senna  can be used to keep you regular.      Remember that you can always come back to the Emergency Department if you are not able to see your regular doctor in the amount of time listed above, if you get any new symptoms, or if there is anything that worries you.

## 2018-12-23 NOTE — LETTER
December 29, 2018      Jose Spears  [address] [address] [address]        To Whom It May Concern:    Jose Spears  was hospitalized at [facility] from 12/23 - 12/29.  Please excuse him from work until he can be seen by his new Primary Care Physician next week to determine if further restrictions are needed.         Sincerely,        Matt Grande MD  Internal Medicine

## 2018-12-24 ENCOUNTER — APPOINTMENT (OUTPATIENT)
Dept: MRI IMAGING | Facility: CLINIC | Age: 33
DRG: 546 | End: 2018-12-24
Attending: PSYCHIATRY & NEUROLOGY
Payer: COMMERCIAL

## 2018-12-24 LAB
ANION GAP SERPL CALCULATED.3IONS-SCNC: 8 MMOL/L (ref 3–14)
BUN SERPL-MCNC: 12 MG/DL (ref 7–30)
CALCIUM SERPL-MCNC: 8.7 MG/DL (ref 8.5–10.1)
CHLORIDE SERPL-SCNC: 108 MMOL/L (ref 94–109)
CO2 SERPL-SCNC: 21 MMOL/L (ref 20–32)
CREAT SERPL-MCNC: 0.71 MG/DL (ref 0.66–1.25)
ERYTHROCYTE [DISTWIDTH] IN BLOOD BY AUTOMATED COUNT: 13.1 % (ref 10–15)
GFR SERPL CREATININE-BSD FRML MDRD: >90 ML/MIN/{1.73_M2}
GLUCOSE SERPL-MCNC: 100 MG/DL (ref 70–99)
HCT VFR BLD AUTO: 44.5 % (ref 40–53)
HGB BLD-MCNC: 14.6 G/DL (ref 13.3–17.7)
IGA SERPL-MCNC: 55 MG/DL (ref 70–380)
IGG SERPL-MCNC: 638 MG/DL (ref 695–1620)
IGM SERPL-MCNC: 109 MG/DL (ref 60–265)
INTERPRETATION ECG - MUSE: NORMAL
LACTATE BLD-SCNC: 1 MMOL/L (ref 0.7–2)
LACTATE BLD-SCNC: 1.9 MMOL/L (ref 0.7–2)
MCH RBC QN AUTO: 28.5 PG (ref 26.5–33)
MCHC RBC AUTO-ENTMCNC: 32.8 G/DL (ref 31.5–36.5)
MCV RBC AUTO: 87 FL (ref 78–100)
PLATELET # BLD AUTO: 221 10E9/L (ref 150–450)
POTASSIUM SERPL-SCNC: 4 MMOL/L (ref 3.4–5.3)
PROT PATTERN SERPL IFE-IMP: ABNORMAL
RBC # BLD AUTO: 5.13 10E12/L (ref 4.4–5.9)
RHEUMATOID FACT SER NEPH-ACNC: <20 IU/ML (ref 0–20)
SODIUM SERPL-SCNC: 137 MMOL/L (ref 133–144)
WBC # BLD AUTO: 13.2 10E9/L (ref 4–11)

## 2018-12-24 PROCEDURE — 40000556 ZZH STATISTIC PERIPHERAL IV START W US GUIDANCE

## 2018-12-24 PROCEDURE — 25000132 ZZH RX MED GY IP 250 OP 250 PS 637: Performed by: STUDENT IN AN ORGANIZED HEALTH CARE EDUCATION/TRAINING PROGRAM

## 2018-12-24 PROCEDURE — 70553 MRI BRAIN STEM W/O & W/DYE: CPT

## 2018-12-24 PROCEDURE — 85027 COMPLETE CBC AUTOMATED: CPT | Performed by: STUDENT IN AN ORGANIZED HEALTH CARE EDUCATION/TRAINING PROGRAM

## 2018-12-24 PROCEDURE — 94150 VITAL CAPACITY TEST: CPT

## 2018-12-24 PROCEDURE — 36415 COLL VENOUS BLD VENIPUNCTURE: CPT | Performed by: PSYCHIATRY & NEUROLOGY

## 2018-12-24 PROCEDURE — 83605 ASSAY OF LACTIC ACID: CPT | Performed by: PSYCHIATRY & NEUROLOGY

## 2018-12-24 PROCEDURE — A9585 GADOBUTROL INJECTION: HCPCS | Performed by: PSYCHIATRY & NEUROLOGY

## 2018-12-24 PROCEDURE — 83605 ASSAY OF LACTIC ACID: CPT | Performed by: STUDENT IN AN ORGANIZED HEALTH CARE EDUCATION/TRAINING PROGRAM

## 2018-12-24 PROCEDURE — 36415 COLL VENOUS BLD VENIPUNCTURE: CPT | Performed by: STUDENT IN AN ORGANIZED HEALTH CARE EDUCATION/TRAINING PROGRAM

## 2018-12-24 PROCEDURE — 85652 RBC SED RATE AUTOMATED: CPT | Performed by: PSYCHIATRY & NEUROLOGY

## 2018-12-24 PROCEDURE — 85025 COMPLETE CBC W/AUTO DIFF WBC: CPT | Performed by: PSYCHIATRY & NEUROLOGY

## 2018-12-24 PROCEDURE — 84145 PROCALCITONIN (PCT): CPT | Performed by: STUDENT IN AN ORGANIZED HEALTH CARE EDUCATION/TRAINING PROGRAM

## 2018-12-24 PROCEDURE — 93010 ELECTROCARDIOGRAM REPORT: CPT | Performed by: INTERNAL MEDICINE

## 2018-12-24 PROCEDURE — 70544 MR ANGIOGRAPHY HEAD W/O DYE: CPT

## 2018-12-24 PROCEDURE — 93005 ELECTROCARDIOGRAM TRACING: CPT

## 2018-12-24 PROCEDURE — 40000275 ZZH STATISTIC RCP TIME EA 10 MIN

## 2018-12-24 PROCEDURE — 72156 MRI NECK SPINE W/O & W/DYE: CPT

## 2018-12-24 PROCEDURE — 25000128 H RX IP 250 OP 636: Performed by: STUDENT IN AN ORGANIZED HEALTH CARE EDUCATION/TRAINING PROGRAM

## 2018-12-24 PROCEDURE — 25500064 ZZH RX 255 OP 636: Performed by: PSYCHIATRY & NEUROLOGY

## 2018-12-24 PROCEDURE — 25000128 H RX IP 250 OP 636: Performed by: EMERGENCY MEDICINE

## 2018-12-24 PROCEDURE — 80048 BASIC METABOLIC PNL TOTAL CA: CPT | Performed by: STUDENT IN AN ORGANIZED HEALTH CARE EDUCATION/TRAINING PROGRAM

## 2018-12-24 PROCEDURE — 86140 C-REACTIVE PROTEIN: CPT | Performed by: STUDENT IN AN ORGANIZED HEALTH CARE EDUCATION/TRAINING PROGRAM

## 2018-12-24 PROCEDURE — 12000003 ZZH R&B CRITICAL UMMC

## 2018-12-24 RX ORDER — GADOBUTROL 604.72 MG/ML
10 INJECTION INTRAVENOUS ONCE
Status: COMPLETED | OUTPATIENT
Start: 2018-12-24 | End: 2018-12-24

## 2018-12-24 RX ORDER — CEFTRIAXONE 2 G/1
2 INJECTION, POWDER, FOR SOLUTION INTRAMUSCULAR; INTRAVENOUS EVERY 12 HOURS
Status: DISCONTINUED | OUTPATIENT
Start: 2018-12-24 | End: 2018-12-26

## 2018-12-24 RX ADMIN — ACYCLOVIR SODIUM 1000 MG: 1000 INJECTION, SOLUTION INTRAVENOUS at 05:16

## 2018-12-24 RX ADMIN — CEFTRIAXONE SODIUM 2 G: 2 INJECTION, POWDER, FOR SOLUTION INTRAMUSCULAR; INTRAVENOUS at 09:04

## 2018-12-24 RX ADMIN — ACETAMINOPHEN 650 MG: 325 TABLET, FILM COATED ORAL at 18:52

## 2018-12-24 RX ADMIN — ACETAMINOPHEN 650 MG: 325 TABLET, FILM COATED ORAL at 07:15

## 2018-12-24 RX ADMIN — GADOBUTROL 10 ML: 604.72 INJECTION INTRAVENOUS at 21:04

## 2018-12-24 RX ADMIN — VANCOMYCIN HYDROCHLORIDE 2000 MG: 10 INJECTION, POWDER, LYOPHILIZED, FOR SOLUTION INTRAVENOUS at 12:02

## 2018-12-24 RX ADMIN — ACYCLOVIR SODIUM 1000 MG: 1000 INJECTION, SOLUTION INTRAVENOUS at 16:04

## 2018-12-24 RX ADMIN — ACETAMINOPHEN 650 MG: 325 TABLET, FILM COATED ORAL at 22:48

## 2018-12-24 RX ADMIN — SODIUM CHLORIDE 1000 ML: 9 INJECTION, SOLUTION INTRAVENOUS at 22:16

## 2018-12-24 RX ADMIN — ENOXAPARIN SODIUM 40 MG: 100 INJECTION SUBCUTANEOUS at 22:16

## 2018-12-24 RX ADMIN — CEFTRIAXONE SODIUM 2 G: 2 INJECTION, POWDER, FOR SOLUTION INTRAMUSCULAR; INTRAVENOUS at 21:15

## 2018-12-24 RX ADMIN — ACETAMINOPHEN 650 MG: 325 TABLET, FILM COATED ORAL at 13:44

## 2018-12-24 ASSESSMENT — ACTIVITIES OF DAILY LIVING (ADL)
ADLS_ACUITY_SCORE: 17
BATHING: 0-->INDEPENDENT
ADLS_ACUITY_SCORE: 13
ADLS_ACUITY_SCORE: 17
ADLS_ACUITY_SCORE: 19
TRANSFERRING: 0-->INDEPENDENT
RETIRED_EATING: 0-->INDEPENDENT
RETIRED_COMMUNICATION: 0-->UNDERSTANDS/COMMUNICATES WITHOUT DIFFICULTY
WHICH_OF_THE_ABOVE_FUNCTIONAL_RISKS_HAD_A_RECENT_ONSET_OR_CHANGE?: AMBULATION
FALL_HISTORY_WITHIN_LAST_SIX_MONTHS: NO
ADLS_ACUITY_SCORE: 19
DRESS: 0-->INDEPENDENT
COGNITION: 0 - NO COGNITION ISSUES REPORTED
SWALLOWING: 0-->SWALLOWS FOODS/LIQUIDS WITHOUT DIFFICULTY
AMBULATION: 0-->INDEPENDENT
TOILETING: 0-->INDEPENDENT
ADLS_ACUITY_SCORE: 19

## 2018-12-24 ASSESSMENT — VISUAL ACUITY: OU: GLASSES;BASELINE

## 2018-12-24 NOTE — PHARMACY-VANCOMYCIN DOSING SERVICE
Pharmacy Vancomycin Initial Note  Date of Service 2018  Patient's  1985  33 year old, male    Indication: Meningitis    Current estimated CrCl = Estimated Creatinine Clearance: 179.6 mL/min (based on SCr of 0.73 mg/dL).    Creatinine for last 3 days  2018:  6:32 PM Creatinine 0.86 mg/dL  2018: 12:01 PM Creatinine 0.73 mg/dL    Recent Vancomycin Level(s) for last 3 days  No results found for requested labs within last 72 hours.      Vancomycin IV Administrations (past 72 hours)      No vancomycin orders with administrations in past 72 hours.                Nephrotoxins and other renal medications (From now, onward)    Start     Dose/Rate Route Frequency Ordered Stop    18 0900  vancomycin (VANCOCIN) 2,000 mg in sodium chloride 0.9 % 500 mL intermittent infusion      20 mg/kg × 102.1 kg  over 2 Hours Intravenous EVERY 12 HOURS 18  vancomycin (VANCOCIN) 2,500 mg in sodium chloride 0.9 % 500 mL intermittent infusion      2,500 mg  over 2 Hours Intravenous ONCE 18  acyclovir (ZOVIRAX) 1,000 mg in D5W 250 mL intermittent infusion      10 mg/kg × 102.1 kg Intravenous ONCE 18            Contrast Orders - past 72 hours (72h ago, onward)    Start     Dose/Rate Route Frequency Ordered Stop    18 1741  gadobutrol (GADAVIST) injection 10.21 mL      0.1 mL/kg × 102.1 kg Intravenous ONCE 18 1740 18 1814                Plan:  1.  Start vancomycin  2500 mg IV once, then 2000 mg IV q12h.   2.  Goal Trough Level: 15-20 mg/L   3.  Pharmacy will check trough levels as appropriate in 1-3 Days.    4. Serum creatinine levels will be ordered daily for the first week of therapy and at least twice weekly for subsequent weeks.    5. [place] method utilized to dose vancomycin therapy: Method 2    Nancy Morris, ReD

## 2018-12-24 NOTE — PROGRESS NOTES
"Cross cover:    Assessed Mr. Spears this morning ~ 500  We told me that he is feeling 60-70% better with regards to his myalgias. His gastrocnemius muscles continue to be sore, but he has not pain in his thighs any longer. He was able to ambulate to the bathroom. Urticaria is substantially improved as well. We discussed in brief the change in plan with regards to antibiotics in the setting of fever and confusion while in the ED. I asked him if he remembered the events in the ED. He stays that he had vivid dreams and said, \"crazy things\" that were related to this dream, denies hallucinations. Evaluated reflexes - brisk bilateral patellar and markedly diminished/absent achilles. Continued CNS level ceftriaxone, acyclovir and vancomycin ON.     Ami Bettencourt MD  Neurology PGY 2   [phone]  "

## 2018-12-24 NOTE — PROGRESS NOTES
Called by ED who noted worsening mental status in the setting of high fever to 102.4 F.  ED.  performed head CT to evaluate for intracranial pathology before proceeding with lumbar puncture and ultimately he did not receive lumbar puncture. Given high-grade fever and mental status change there was concern for a possible meningitis/encephalitis and he was started on empiric antimicrobials and antiviral at CNS level dosing.  This was discussed with Dr. Abreu before proceeding.  Given that we could not rule out an infectious etiology via lumbar puncture we did not proceed with IVIG overnight.    Ami Bettencourt MD  Neurology PGY2

## 2018-12-24 NOTE — PROGRESS NOTES
[facility]  Neurology Progress Note  12/24/18    Patient Name: Jose Spears    Interval events:  The patient myalgia and sore throat feels much better. He refused doing LP today.     Plan for today  -C spine MRI wow contrast  -Brain MRI wow contrast.   -Continue same antibiotics management.     Objective:  B/P: 118/64, T: 100, P: 85, R: 18    GENERAL: NAD, lying on bed  HEENT: NC/AT. Mucous membranes moist.  CARDIAC: RRR without  Murmur.  RESPIRATORY: Good effort. CTAB. No w/r/r appreciated.  ABDOMINAL: Soft, non tender, non distended. + BS.   EXTREMITIES: Warm, dry.     Neurological examination:  Mental status: Patient is alert, attentive, and oriented x 3. Language is coherent, spontaneous speech and fluent with intact repetition, comprehension, without dysarthria or aphasia. Memory and ability to follow commands were intact. Fund of knowledge normal.      Cranial nerves:   I    II Visual fields intact   III,IV, VI Extraocular movements were full. Pupils were round, 3 mm in diameter and reacted to light. Convergence intact.  No nystagmus   V Facial sensation intact to touch, jaw movements, corneal reflex (afferent limb)   VII Facial movements-both spontaneous and to command intact (raising eyebrows, closing eyes, smiling)   VIII Hearing intact to conversation.    IX, X Palate movement with no weakness/ or atrophy, pharyngeal sensation intact.    XI Shrugging shoulders, turning head against resistance both Intact    XII No Tongue weakness or atrophy. Midline tongue position.      Motor exam:  Manual muscle testing revealed the following MRC grade muscle power:    Right   Left     Neck flexion   5       Neck extension:   5       Shoulder abduction:   5   5     Elbow extension:   5   5     Elbow flexion:   5   5     Wrist flexion:   5   5     Wrist extension:   5   5     FDI   5 5   Hip flexion   5 5   Hip extension   5   5     Knee flexion   5   5     Knee extension   5   5      Dorsiflexion   5 5   Foot inversion 5 5   Foot eversion 5 5   Plantar flexion   5 5       Normal Tone. No muscle atrophy. No fasciculation. No clonus. No Babinski. No Yanira.    Deep tendon reflexes:       Right   Left     Triceps   1+ 2   Biceps   1+  2     Brachioradialis   1+ 2     Knee jerk   2 2   Ankle jerk   0 0           No chin reflex, no crossed adductors    Complex motor skills: no ataxia or tremor or chorea or athetosis      Coordination: FNF, HST are intact/no dysmetria. ALAINA intact.     Sensory exam: Pin prick/ light touch intact x 4 extremities.     Vibration sensation:    Right-in seconds Left-in seconds    Big toe  + +   MM + +   Knee     Index + +     Positional sense preserved on big toe/index finger.      Gait: Not tested     Imaging  C spine MRI and brain MRI are pending.     Pertinent Studies  Wbc 13    Assessment & Plan:  Jose Spears is a 33 year old male with history of osteogenesis imperfecta who presents with subacute onset of generalized weakness, fever, myalgia that was proceeded with hives for about 5 days and improved on steroid. Now improving on antibiotics/antivrial. Hard to confirm meningitis/encephalitis as he refused LP today. unlikely to be AIDP.      #Concern for meningitis/encephalitis.   -plan for Lumbar puncture today but patient refused.  -MRI brain and C spine wow contrast  -Continue ceftriaxone/vancomycin and acyclovir.       #Back pain:  We will hold methocarbamol for the time being     #History of osteogenesis imperfecta  -Follow up     Code: Full  FEN: Regular adult diet  Ppx: SCDs, Lovenox  Dispo: if no LP tomorrow, plan to switch to antiviral coverage.     Patient was seen and discussed with attending doctor, Dr. Bustamante.    Anil Howard MD  Neurology PGY2  Pager: [phone]    Neurology Staff Addendum  I have seen and evaluated the patient today and discussed with the resident team and agree with the documentation.  Agree with concern about the need for  the LP.  I stressed to the patient that we advise an LP and have difficulty managing with CSF fluid.    Patient is declining in large part because he is feeling better likely because antibiotics are helping.  He has a pneumonia on CXR however unlikely that pneumonia to manifest the weakness described earlier.  Of note on my exam today patient clearly had reflexes thus this is not GBS.  However certain infectious meningitis is plausible and would be expected to improve with antibiotics.  Await MRI of Head and C spine (will add thoracic if necessary).      SULAIMAN RIVERS MD

## 2018-12-24 NOTE — PLAN OF CARE
Admitted for infection work up, increasing weakness and gait difficulty. VSS ex Tmax 100.7. On RA. Neuros intact. Alert and oriented x4. PIV TKO in between antibx. Regular diet. Void without difficulty. Up SBA. Plan for possibly LP.

## 2018-12-24 NOTE — PLAN OF CARE
Status: On 6a after gait instability and generalized weakness.   VS: Tmax 101.6, sepsis triggered with lactic acid at 1.0. OVSS, softer BPs.    Neuros: AxOx4, improving weakness.   GI: Tolerating regular diet. States he is having multiple loose BMs but declines watery stools.   : Voiding spontaneously  ?  IV: IV TKO between abx  Activity: Up independently. Calls appropriately for assistance  Pain: Declines pain.   Respiratory/Trach: On RA   Skin: Rash BUE, acyclovir extravasation site marked with minimal swelling  Social: Family present this afternoon. Pt pleasant and cooperative with cares.     Plan of care: Continue with abx treatment and follow POC

## 2018-12-25 LAB
ALBUMIN UR-MCNC: NEGATIVE MG/DL
ANION GAP SERPL CALCULATED.3IONS-SCNC: 9 MMOL/L (ref 3–14)
APPEARANCE CSF: CLEAR
APPEARANCE UR: CLEAR
BASOPHILS # BLD AUTO: 0 10E9/L (ref 0–0.2)
BASOPHILS # BLD AUTO: 0 10E9/L (ref 0–0.2)
BASOPHILS NFR BLD AUTO: 0.1 %
BASOPHILS NFR BLD AUTO: 0.2 %
BILIRUB UR QL STRIP: NEGATIVE
BUN SERPL-MCNC: 7 MG/DL (ref 7–30)
C COLI+JEJUNI+LARI FUSA STL QL NAA+PROBE: NOT DETECTED
C DIFF TOX B STL QL: NEGATIVE
CALCIUM SERPL-MCNC: 7.9 MG/DL (ref 8.5–10.1)
CHLORIDE SERPL-SCNC: 105 MMOL/L (ref 94–109)
CO2 SERPL-SCNC: 21 MMOL/L (ref 20–32)
COLOR CSF: COLORLESS
COLOR UR AUTO: YELLOW
CREAT SERPL-MCNC: 0.73 MG/DL (ref 0.66–1.25)
CRP SERPL-MCNC: 180 MG/L (ref 0–8)
CRYPTOC AG SPEC QL: NORMAL
DIFFERENTIAL METHOD BLD: ABNORMAL
DIFFERENTIAL METHOD BLD: ABNORMAL
EC STX1 GENE STL QL NAA+PROBE: NOT DETECTED
EC STX2 GENE STL QL NAA+PROBE: NOT DETECTED
ENTERIC PATHOGEN COMMENT: NORMAL
EOSINOPHIL # BLD AUTO: 0.1 10E9/L (ref 0–0.7)
EOSINOPHIL # BLD AUTO: 0.1 10E9/L (ref 0–0.7)
EOSINOPHIL NFR BLD AUTO: 0.5 %
EOSINOPHIL NFR BLD AUTO: 0.5 %
ERYTHROCYTE [DISTWIDTH] IN BLOOD BY AUTOMATED COUNT: 13.1 % (ref 10–15)
ERYTHROCYTE [DISTWIDTH] IN BLOOD BY AUTOMATED COUNT: 13.1 % (ref 10–15)
ERYTHROCYTE [SEDIMENTATION RATE] IN BLOOD BY WESTERGREN METHOD: 20 MM/H (ref 0–15)
EV RNA SPEC QL NAA+PROBE: NEGATIVE
GFR SERPL CREATININE-BSD FRML MDRD: >90 ML/MIN/{1.73_M2}
GLUCOSE CSF-MCNC: 63 MG/DL (ref 40–70)
GLUCOSE SERPL-MCNC: 92 MG/DL (ref 70–99)
GLUCOSE UR STRIP-MCNC: NEGATIVE MG/DL
GRAM STN SPEC: NORMAL
HAEM INFLU A AG SPEC QL: NORMAL
HCT VFR BLD AUTO: 38.5 % (ref 40–53)
HCT VFR BLD AUTO: 40.2 % (ref 40–53)
HETEROPH AB SER QL: NEGATIVE
HGB BLD-MCNC: 12.9 G/DL (ref 13.3–17.7)
HGB BLD-MCNC: 13.7 G/DL (ref 13.3–17.7)
HGB UR QL STRIP: NEGATIVE
IMM GRANULOCYTES # BLD: 0 10E9/L (ref 0–0.4)
IMM GRANULOCYTES # BLD: 0 10E9/L (ref 0–0.4)
IMM GRANULOCYTES NFR BLD: 0.2 %
IMM GRANULOCYTES NFR BLD: 0.3 %
INDIA INK PREP SPEC: NORMAL
INR PPP: 1.31 (ref 0.86–1.14)
KETONES UR STRIP-MCNC: NEGATIVE MG/DL
LACTATE BLD-SCNC: 1.4 MMOL/L (ref 0.7–2)
LEUKOCYTE ESTERASE UR QL STRIP: NEGATIVE
LYMPHOCYTES # BLD AUTO: 1.7 10E9/L (ref 0.8–5.3)
LYMPHOCYTES # BLD AUTO: 1.7 10E9/L (ref 0.8–5.3)
LYMPHOCYTES NFR BLD AUTO: 12.7 %
LYMPHOCYTES NFR BLD AUTO: 13.3 %
Lab: NORMAL
Lab: NORMAL
MCH RBC QN AUTO: 28.5 PG (ref 26.5–33)
MCH RBC QN AUTO: 28.8 PG (ref 26.5–33)
MCHC RBC AUTO-ENTMCNC: 33.5 G/DL (ref 31.5–36.5)
MCHC RBC AUTO-ENTMCNC: 34.1 G/DL (ref 31.5–36.5)
MCV RBC AUTO: 85 FL (ref 78–100)
MCV RBC AUTO: 85 FL (ref 78–100)
MONOCYTES # BLD AUTO: 0.6 10E9/L (ref 0–1.3)
MONOCYTES # BLD AUTO: 0.9 10E9/L (ref 0–1.3)
MONOCYTES NFR BLD AUTO: 4.8 %
MONOCYTES NFR BLD AUTO: 6.8 %
MUCOUS THREADS #/AREA URNS LPF: PRESENT /LPF
N MEN SG A+Y AG SPEC QL LA: NORMAL
N MEN SG B+E COLI K1 AG SPEC QL LA: NORMAL
NEUTROPHILS # BLD AUTO: 10.5 10E9/L (ref 1.6–8.3)
NEUTROPHILS # BLD AUTO: 10.5 10E9/L (ref 1.6–8.3)
NEUTROPHILS NFR BLD AUTO: 79.6 %
NEUTROPHILS NFR BLD AUTO: 81 %
NITRATE UR QL: NEGATIVE
NOROV GI+II ORF1-ORF2 JNC STL QL NAA+PR: NOT DETECTED
NRBC # BLD AUTO: 0 10*3/UL
NRBC # BLD AUTO: 0 10*3/UL
NRBC BLD AUTO-RTO: 0 /100
NRBC BLD AUTO-RTO: 0 /100
PH UR STRIP: 6 PH (ref 5–7)
PLATELET # BLD AUTO: 215 10E9/L (ref 150–450)
PLATELET # BLD AUTO: 239 10E9/L (ref 150–450)
POTASSIUM SERPL-SCNC: 3.4 MMOL/L (ref 3.4–5.3)
PROCALCITONIN SERPL-MCNC: 5.71 NG/ML
PROCALCITONIN SERPL-MCNC: 5.72 NG/ML
PROT CSF-MCNC: 16 MG/DL (ref 15–60)
RBC # BLD AUTO: 4.52 10E12/L (ref 4.4–5.9)
RBC # BLD AUTO: 4.76 10E12/L (ref 4.4–5.9)
RBC # CSF MANUAL: 0 /UL (ref 0–2)
RBC #/AREA URNS AUTO: <1 /HPF (ref 0–2)
RVA NSP5 STL QL NAA+PROBE: NOT DETECTED
S PNEUM AG SPEC QL: NORMAL
SALMONELLA SP RPOD STL QL NAA+PROBE: NOT DETECTED
SHIGELLA SP+EIEC IPAH STL QL NAA+PROBE: NOT DETECTED
SODIUM SERPL-SCNC: 136 MMOL/L (ref 133–144)
SOURCE: ABNORMAL
SP GR UR STRIP: 1.01 (ref 1–1.03)
SPECIMEN SOURCE: NORMAL
TUBE # CSF: 4 #
UROBILINOGEN UR STRIP-MCNC: NORMAL MG/DL (ref 0–2)
V CHOL+PARA RFBL+TRKH+TNAA STL QL NAA+PR: NOT DETECTED
VANCOMYCIN SERPL-MCNC: 6 MG/L
WBC # BLD AUTO: 13 10E9/L (ref 4–11)
WBC # BLD AUTO: 13.2 10E9/L (ref 4–11)
WBC # CSF MANUAL: 0 /UL (ref 0–5)
WBC #/AREA URNS AUTO: 0 /HPF (ref 0–5)
Y ENTERO RECN STL QL NAA+PROBE: NOT DETECTED

## 2018-12-25 PROCEDURE — 87205 SMEAR GRAM STAIN: CPT | Performed by: PSYCHIATRY & NEUROLOGY

## 2018-12-25 PROCEDURE — 87070 CULTURE OTHR SPECIMN AEROBIC: CPT | Performed by: PSYCHIATRY & NEUROLOGY

## 2018-12-25 PROCEDURE — 86617 LYME DISEASE ANTIBODY: CPT | Performed by: PSYCHIATRY & NEUROLOGY

## 2018-12-25 PROCEDURE — 25000128 H RX IP 250 OP 636: Performed by: PSYCHIATRY & NEUROLOGY

## 2018-12-25 PROCEDURE — 82040 ASSAY OF SERUM ALBUMIN: CPT | Performed by: EMERGENCY MEDICINE

## 2018-12-25 PROCEDURE — 83516 IMMUNOASSAY NONANTIBODY: CPT | Performed by: PSYCHIATRY & NEUROLOGY

## 2018-12-25 PROCEDURE — 87076 CULTURE ANAEROBE IDENT EACH: CPT | Performed by: EMERGENCY MEDICINE

## 2018-12-25 PROCEDURE — 86789 WEST NILE VIRUS ANTIBODY: CPT | Performed by: EMERGENCY MEDICINE

## 2018-12-25 PROCEDURE — 25000132 ZZH RX MED GY IP 250 OP 250 PS 637: Performed by: STUDENT IN AN ORGANIZED HEALTH CARE EDUCATION/TRAINING PROGRAM

## 2018-12-25 PROCEDURE — 25000128 H RX IP 250 OP 636: Performed by: EMERGENCY MEDICINE

## 2018-12-25 PROCEDURE — 86403 PARTICLE AGGLUT ANTBDY SCRN: CPT | Performed by: PSYCHIATRY & NEUROLOGY

## 2018-12-25 PROCEDURE — 87015 SPECIMEN INFECT AGNT CONCNTJ: CPT | Performed by: PSYCHIATRY & NEUROLOGY

## 2018-12-25 PROCEDURE — 86788 WEST NILE VIRUS AB IGM: CPT | Performed by: EMERGENCY MEDICINE

## 2018-12-25 PROCEDURE — 86682 HELMINTH ANTIBODY: CPT | Performed by: EMERGENCY MEDICINE

## 2018-12-25 PROCEDURE — 86658 ENTEROVIRUS ANTIBODY: CPT | Performed by: EMERGENCY MEDICINE

## 2018-12-25 PROCEDURE — 87798 DETECT AGENT NOS DNA AMP: CPT | Performed by: EMERGENCY MEDICINE

## 2018-12-25 PROCEDURE — 40000275 ZZH STATISTIC RCP TIME EA 10 MIN

## 2018-12-25 PROCEDURE — 86635 COCCIDIOIDES ANTIBODY: CPT | Performed by: EMERGENCY MEDICINE

## 2018-12-25 PROCEDURE — 86038 ANTINUCLEAR ANTIBODIES: CPT | Performed by: PSYCHIATRY & NEUROLOGY

## 2018-12-25 PROCEDURE — 87498 ENTEROVIRUS PROBE&REVRS TRNS: CPT | Performed by: EMERGENCY MEDICINE

## 2018-12-25 PROCEDURE — 87075 CULTR BACTERIA EXCEPT BLOOD: CPT | Performed by: EMERGENCY MEDICINE

## 2018-12-25 PROCEDURE — 36415 COLL VENOUS BLD VENIPUNCTURE: CPT | Performed by: PSYCHIATRY & NEUROLOGY

## 2018-12-25 PROCEDURE — 82945 GLUCOSE OTHER FLUID: CPT | Performed by: EMERGENCY MEDICINE

## 2018-12-25 PROCEDURE — 86235 NUCLEAR ANTIGEN ANTIBODY: CPT | Performed by: PSYCHIATRY & NEUROLOGY

## 2018-12-25 PROCEDURE — 89050 BODY FLUID CELL COUNT: CPT | Performed by: EMERGENCY MEDICINE

## 2018-12-25 PROCEDURE — 87210 SMEAR WET MOUNT SALINE/INK: CPT | Performed by: PSYCHIATRY & NEUROLOGY

## 2018-12-25 PROCEDURE — 85025 COMPLETE CBC W/AUTO DIFF WBC: CPT | Performed by: STUDENT IN AN ORGANIZED HEALTH CARE EDUCATION/TRAINING PROGRAM

## 2018-12-25 PROCEDURE — 84157 ASSAY OF PROTEIN OTHER: CPT | Performed by: EMERGENCY MEDICINE

## 2018-12-25 PROCEDURE — 83876 ASSAY MYELOPEROXIDASE: CPT | Performed by: PSYCHIATRY & NEUROLOGY

## 2018-12-25 PROCEDURE — 25000128 H RX IP 250 OP 636: Performed by: STUDENT IN AN ORGANIZED HEALTH CARE EDUCATION/TRAINING PROGRAM

## 2018-12-25 PROCEDURE — 87493 C DIFF AMPLIFIED PROBE: CPT | Performed by: STUDENT IN AN ORGANIZED HEALTH CARE EDUCATION/TRAINING PROGRAM

## 2018-12-25 PROCEDURE — 83916 OLIGOCLONAL BANDS: CPT | Performed by: EMERGENCY MEDICINE

## 2018-12-25 PROCEDURE — 86403 PARTICLE AGGLUT ANTBDY SCRN: CPT | Performed by: EMERGENCY MEDICINE

## 2018-12-25 PROCEDURE — 36415 COLL VENOUS BLD VENIPUNCTURE: CPT | Performed by: STUDENT IN AN ORGANIZED HEALTH CARE EDUCATION/TRAINING PROGRAM

## 2018-12-25 PROCEDURE — 87102 FUNGUS ISOLATION CULTURE: CPT | Performed by: PSYCHIATRY & NEUROLOGY

## 2018-12-25 PROCEDURE — 87529 HSV DNA AMP PROBE: CPT | Performed by: EMERGENCY MEDICINE

## 2018-12-25 PROCEDURE — 85610 PROTHROMBIN TIME: CPT | Performed by: PSYCHIATRY & NEUROLOGY

## 2018-12-25 PROCEDURE — 009U3ZX DRAINAGE OF SPINAL CANAL, PERCUTANEOUS APPROACH, DIAGNOSTIC: ICD-10-PCS | Performed by: PSYCHIATRY & NEUROLOGY

## 2018-12-25 PROCEDURE — 86617 LYME DISEASE ANTIBODY: CPT | Performed by: EMERGENCY MEDICINE

## 2018-12-25 PROCEDURE — 82042 OTHER SOURCE ALBUMIN QUAN EA: CPT | Performed by: EMERGENCY MEDICINE

## 2018-12-25 PROCEDURE — 25000132 ZZH RX MED GY IP 250 OP 250 PS 637: Performed by: INTERNAL MEDICINE

## 2018-12-25 PROCEDURE — 87181 SC STD AGAR DILUTION PER AGT: CPT | Performed by: EMERGENCY MEDICINE

## 2018-12-25 PROCEDURE — 87529 HSV DNA AMP PROBE: CPT | Performed by: PSYCHIATRY & NEUROLOGY

## 2018-12-25 PROCEDURE — 87476 LYME DIS DNA AMP PROBE: CPT | Performed by: EMERGENCY MEDICINE

## 2018-12-25 PROCEDURE — 84145 PROCALCITONIN (PCT): CPT | Performed by: STUDENT IN AN ORGANIZED HEALTH CARE EDUCATION/TRAINING PROGRAM

## 2018-12-25 PROCEDURE — 12000008 ZZH R&B INTERMEDIATE UMMC

## 2018-12-25 PROCEDURE — 83605 ASSAY OF LACTIC ACID: CPT | Performed by: PSYCHIATRY & NEUROLOGY

## 2018-12-25 PROCEDURE — 87389 HIV-1 AG W/HIV-1&-2 AB AG IA: CPT | Performed by: PSYCHIATRY & NEUROLOGY

## 2018-12-25 PROCEDURE — 82784 ASSAY IGA/IGD/IGG/IGM EACH: CPT | Performed by: EMERGENCY MEDICINE

## 2018-12-25 PROCEDURE — 94150 VITAL CAPACITY TEST: CPT

## 2018-12-25 PROCEDURE — 00000102 ZZHCL STATISTIC CYTO WRIGHT STAIN TC: Performed by: PSYCHIATRY & NEUROLOGY

## 2018-12-25 PROCEDURE — 80202 ASSAY OF VANCOMYCIN: CPT | Performed by: PSYCHIATRY & NEUROLOGY

## 2018-12-25 PROCEDURE — 87449 NOS EACH ORGANISM AG IA: CPT | Performed by: EMERGENCY MEDICINE

## 2018-12-25 PROCEDURE — 86592 SYPHILIS TEST NON-TREP QUAL: CPT | Performed by: EMERGENCY MEDICINE

## 2018-12-25 PROCEDURE — 87506 IADNA-DNA/RNA PROBE TQ 6-11: CPT | Performed by: STUDENT IN AN ORGANIZED HEALTH CARE EDUCATION/TRAINING PROGRAM

## 2018-12-25 PROCEDURE — 40000556 ZZH STATISTIC PERIPHERAL IV START W US GUIDANCE

## 2018-12-25 PROCEDURE — 86308 HETEROPHILE ANTIBODY SCREEN: CPT | Performed by: PSYCHIATRY & NEUROLOGY

## 2018-12-25 PROCEDURE — 88108 CYTOPATH CONCENTRATE TECH: CPT | Performed by: PSYCHIATRY & NEUROLOGY

## 2018-12-25 PROCEDURE — 80048 BASIC METABOLIC PNL TOTAL CA: CPT | Performed by: STUDENT IN AN ORGANIZED HEALTH CARE EDUCATION/TRAINING PROGRAM

## 2018-12-25 PROCEDURE — 87899 AGENT NOS ASSAY W/OPTIC: CPT | Performed by: PSYCHIATRY & NEUROLOGY

## 2018-12-25 PROCEDURE — 84999 UNLISTED CHEMISTRY PROCEDURE: CPT | Performed by: EMERGENCY MEDICINE

## 2018-12-25 PROCEDURE — 81001 URINALYSIS AUTO W/SCOPE: CPT | Performed by: STUDENT IN AN ORGANIZED HEALTH CARE EDUCATION/TRAINING PROGRAM

## 2018-12-25 RX ORDER — IBUPROFEN 200 MG
600 TABLET ORAL EVERY 4 HOURS PRN
Status: DISCONTINUED | OUTPATIENT
Start: 2018-12-25 | End: 2018-12-29 | Stop reason: HOSPADM

## 2018-12-25 RX ORDER — LIDOCAINE HYDROCHLORIDE 10 MG/ML
5 INJECTION, SOLUTION EPIDURAL; INFILTRATION; INTRACAUDAL; PERINEURAL ONCE
Status: DISCONTINUED | OUTPATIENT
Start: 2018-12-25 | End: 2018-12-29 | Stop reason: HOSPADM

## 2018-12-25 RX ORDER — HYDROMORPHONE HCL/0.9% NACL/PF 0.2MG/0.2
0.2 SYRINGE (ML) INTRAVENOUS ONCE
Status: COMPLETED | OUTPATIENT
Start: 2018-12-25 | End: 2018-12-25

## 2018-12-25 RX ORDER — LORAZEPAM 2 MG/ML
1 INJECTION INTRAMUSCULAR ONCE
Status: COMPLETED | OUTPATIENT
Start: 2018-12-25 | End: 2018-12-25

## 2018-12-25 RX ADMIN — ACETAMINOPHEN 650 MG: 325 TABLET, FILM COATED ORAL at 20:46

## 2018-12-25 RX ADMIN — VANCOMYCIN HYDROCHLORIDE 2000 MG: 10 INJECTION, POWDER, LYOPHILIZED, FOR SOLUTION INTRAVENOUS at 15:08

## 2018-12-25 RX ADMIN — ACYCLOVIR SODIUM 1000 MG: 1000 INJECTION, SOLUTION INTRAVENOUS at 18:17

## 2018-12-25 RX ADMIN — ACETAMINOPHEN 650 MG: 325 TABLET, FILM COATED ORAL at 16:09

## 2018-12-25 RX ADMIN — IBUPROFEN 600 MG: 200 TABLET, FILM COATED ORAL at 18:17

## 2018-12-25 RX ADMIN — CEFTRIAXONE SODIUM 2 G: 2 INJECTION, POWDER, FOR SOLUTION INTRAMUSCULAR; INTRAVENOUS at 08:48

## 2018-12-25 RX ADMIN — ACYCLOVIR SODIUM 1000 MG: 1000 INJECTION, SOLUTION INTRAVENOUS at 01:13

## 2018-12-25 RX ADMIN — LORAZEPAM 1 MG: 2 INJECTION, SOLUTION INTRAMUSCULAR; INTRAVENOUS at 13:48

## 2018-12-25 RX ADMIN — ACYCLOVIR SODIUM 1000 MG: 1000 INJECTION, SOLUTION INTRAVENOUS at 10:23

## 2018-12-25 RX ADMIN — ACETAMINOPHEN 650 MG: 325 TABLET, FILM COATED ORAL at 09:05

## 2018-12-25 RX ADMIN — ENOXAPARIN SODIUM 40 MG: 100 INJECTION SUBCUTANEOUS at 21:18

## 2018-12-25 RX ADMIN — ACETAMINOPHEN 650 MG: 325 TABLET, FILM COATED ORAL at 12:11

## 2018-12-25 RX ADMIN — VANCOMYCIN HYDROCHLORIDE 2000 MG: 10 INJECTION, POWDER, LYOPHILIZED, FOR SOLUTION INTRAVENOUS at 03:15

## 2018-12-25 RX ADMIN — CEFTRIAXONE SODIUM 2 G: 2 INJECTION, POWDER, FOR SOLUTION INTRAMUSCULAR; INTRAVENOUS at 20:44

## 2018-12-25 RX ADMIN — VANCOMYCIN HYDROCHLORIDE 2000 MG: 10 INJECTION, POWDER, LYOPHILIZED, FOR SOLUTION INTRAVENOUS at 22:55

## 2018-12-25 RX ADMIN — Medication 0.2 MG: at 13:49

## 2018-12-25 RX ADMIN — ACETAMINOPHEN 650 MG: 325 TABLET, FILM COATED ORAL at 04:53

## 2018-12-25 ASSESSMENT — ACTIVITIES OF DAILY LIVING (ADL)
ADLS_ACUITY_SCORE: 13

## 2018-12-25 ASSESSMENT — VISUAL ACUITY: OU: GLASSES;BASELINE

## 2018-12-25 NOTE — PROGRESS NOTES
Cross cover:     Notified by nursing ~10 PM for fever, tachycardia to 120s and possible rigors.     Clinically, Mr. Spears continues to feel better from myalgia standpoint. He denies headache, neck pain, chest pain, or abdominal pain. He reports diarrhea starting last night prior to antibiotics. He had 6 stools today with liquid and some formed components.     Physical exam: Vital signs - T 102.9, normotension and tachycardia to low 100s.     General: No acute distress  Lungs: Clear to auscultation   Heart: RRR, tachycardia. No murmurs.   Abdomen: Soft, nontender, nondistended   Skin: Urticarial rash, warm to touch.     I was concerned about his ongoing high grade fever despite acetaminophen and I ordered CBC,  procal and lactate with results noteworthy for declining leukocytosis (13.0 with neutrophilia), lactate WNL (1.9) and procal 5.71 indicating high risk for sepsis. He is already on broad spectrum antibiotics only neglecting pseudomonal and antifungal coverage and I curbsided  medicine to discuss his case. After discussion I added the following labs:   - CRP (H) 180.0   - ESR (H) 20   - Stool enteric - pending   - Cdiff - pending   - UA - pending     Fever did come down and he is not currently febrile. HR without tachycardia and he was sleeping when I checked on him at 0300.     Ami Bettencourt MD  Neurology PGY 2

## 2018-12-25 NOTE — PROGRESS NOTES
MD paged regarding pt's episode of chills. HR tachy in upper 120's, respiratory rate 32, temp 101.9 axillary. Pt shaking severely, unable to obtain BP at this time. MD at bedside to see pt, plan for 1L bolus, lactic acid & procalcitonin draws. Will continue to monitor.

## 2018-12-25 NOTE — PROGRESS NOTES
Status: Pt admitted d/t subacute onset of generalized weakness, fever, myalgia proceeded by hives for 5 days, improved with steroids. Now on antibiotics and antivirals.     VS: Tmax 102.9 orally, MD notified. HR tachy intermittently in the 120's. Sats WNL on RA.   Neuros: intact  GI: Regular diet. Pt reports frequent diarrhea. Needs stool sample.   : Voiding adequate amounts.   IV: PIV TKO between abx. 1L bolus of NS currently infusing.   Activity: Up independently.  Pain: Denies  Skin: Rash to BUE. Acyclovir extravasation site.   Social: Mom, Nereida, updated on pt's status.   Plan of care: MRI completed this evening. Will need MRA done tomorrow. Continue to monitor and updated MDs accordingly.

## 2018-12-25 NOTE — PLAN OF CARE
Cared for from 0711-0773: Pt admitted from home with progressive weakness and instability. A&Ox4. Neuros intact besides slight generalized weakness and deaf to left (deaf at baseline), pt states he feels almost back to baseline. AVSS besides max temp 101.4 orally, PRN tylenol given. Lactic acid level 1. Denied any pain. Refused LP today. MRI to be completed this evening. Tolerating IV abx, otherwise SL. On a regular diet with good PO intake. Up independently. Continue with POC.

## 2018-12-25 NOTE — PLAN OF CARE
Status: Admitted d/t subacute onset of generalized weakness, fever, myalgia proceeded by hives for 5 days, improved with steroids. Now on antibiotics & antivirals.     VS: VSS, tmax 101.1  Neuros: Intact, uppers 4/5, lowers 5/5. Pt states he feels weaker when sitting up in chair.  GI: Regular diet. Still c/o diarrhea, C. Diff results negative.   : Voiding adequately  IV: PIV TKO between abx.   Activity: Up independently.  Pain: Denies.   Skin: Rash to BUE, pt states is worse than previous, MD Howard aware.   Social: Mother and fiancee and children present today.   Plan of care: LP completed this afternoon. Continue to monitor and updated MDs accordingly.

## 2018-12-25 NOTE — PLAN OF CARE
Status: Admitted d/t subacute onset of generalized weakness, fever, myalgia proceeded by hives for 5 days, improved with steroids. Now on antibiotics & antivirals.     VS: stable except Tmax 100.8 this shift, most recently 99.2.   Neuros: intact  GI: Regular diet. Loose BM x1, stool sample send to lab for c.diff   : Voiding adequately  IV: PIV TKO between abx. Pt received 1L bolus last evening  Activity: Up independently.  Pain: Denies  Skin: Rash to BUE, Acyclovir extravasation site.   Social: No family overnight  Plan of care: New order for UA, need urine sample. Continue to monitor and updated MDs accordingly.

## 2018-12-25 NOTE — PROGRESS NOTES
[facility]  Neurology Progress Note  12/25/18    Patient Name: Jose Spears    Interval events:  Tachycardic and febrile over night with elevated procal and CRP. Sx improved w tylenol.     Plan for today   -will do LP and send some work up   -will trend the procal   -continue same antibiotics management.     Objective:  B/P: 122/62, T: 102.6, P: 85, R: 20    GENERAL: NAD, lying on bed  HEENT: NC/AT. Mucous membranes moist.  CARDIAC: RRR without  Murmur.  RESPIRATORY: Good effort. CTAB. No w/r/r appreciated.  ABDOMINAL: Soft, non tender, non distended. + BS.   EXTREMITIES: Warm, dry.     Neurological examination:  Mental status: Patient is alert, attentive, and oriented x 3. Language is coherent, spontaneous speech and fluent with intact repetition, comprehension, without dysarthria or aphasia. Memory and ability to follow commands were intact. Fund of knowledge normal.   Cranial nerves:   I     II Visual fields intact   III,IV, VI Extraocular movements were full. Pupils were round, 3 mm in diameter and reacted to light. Convergence intact.  No nystagmus   V Facial sensation intact to touch, jaw movements, corneal reflex (afferent limb)   VII Facial movements-both spontaneous and to command intact (raising eyebrows, closing eyes, smiling)   VIII Hearing intact to conversation.    IX, X Palate movement with no weakness/ or atrophy, pharyngeal sensation intact.    XI Shrugging shoulders, turning head against resistance both Intact    XII No Tongue weakness or atrophy. Midline tongue position.       Motor exam:  Manual muscle testing revealed the following MRC grade muscle power:    Right   Left     Neck flexion   5       Neck extension:   5       Shoulder abduction:   5   5     Elbow extension:   5   5     Elbow flexion:   5   5     Wrist flexion:   5   5     Wrist extension:   5   5     FDI   5 5   Hip flexion   5 5   Hip extension   5   5     Knee flexion   5   5     Knee extension    5   5     Dorsiflexion   5 5   Foot inversion 5 5   Foot eversion 5 5   Plantar flexion   5 5        Normal Tone. No muscle atrophy. No fasciculation. No clonus. No Babinski. No Yanira.     Deep tendon reflexes:       Right   Left     Triceps   2 2   Biceps   2  2     Brachioradialis   2 2     Knee jerk   2 2   Ankle jerk   2 2            No chin reflex, no crossed adductors     Complex motor skills: no ataxia or tremor or chorea or athetosis      Coordination: FNF, HST are intact/no dysmetria. ALAINA intact.      Sensory exam: Pin prick/ light touch intact x 4 extremities.      Vibration sensation:     Right-in seconds Left-in seconds    Big toe  + +   MM + +   Knee       Index + +      Positional sense preserved on big toe/index finger.     Gait: Not tested   Imaging  MRI/MRA C spine 12/25  1. No acute infarct or intracranial hemorrhage.  2. No abnormal enhancing lesions intracranially.  3. Cerebral white matter changes out of proportion to the patient's  age. These may reflect an underlying vasculopathy or relate to prior  infectious or inflammatory insult(s).  4. Head MRA demonstrates no aneurysm or stenosis of the major  intracranial arteries.  5. Left parietal bone prominent intraosseous arachnoid granulation.     CXR w L lobar pneumonia.     Pertinent Studies  Monospot neg.   Wbc 13.2   C diff neg   procal 5.7     Vasculitis panel pending.      ESR 20   Enteric bacteria and virus neg.   Anti SSA/B pending     CSF studies pending    HIV pending     RODRIGO pending.     Assessment & Plan:  Jose Spears is a 33 year old male with history of osteogenesis imperfecta who presents with subacute onset of generalized weakness, fever, myalgia and maculopapular rash. Received about 5 days course of steroid when he had a rash bc the thoughts of hives. His hemodynamics still fluctuating in term of developing fever and tachycardia. Now on antibiotics/antivrial. CXR with L lobar consolidation. procal 5.7.  unlikely  to be AIDP. Will proceed to LP today and assess for possible meningitis/encephalitis picture.       #Concern for meningitis/encephalitis.   The pt has fever and tachycardia. Presented initially for concern of AIDP. He has normal reflexes and AIDP it is not of concern. However. I think he got some kind of infectious process going on. Possible sources would be lung and CNS. This kind of subacute onset with rash makes viral etiology high on my ddx. Other possible causes would be HIV/cryptoccous vs fungal infection vs Lyme. MRI brain and C spine not of significant concern meanwhile.   -LP and lots of studies sent out  -Continue ceftriaxone/vancomycin and acyclovir D3.    -Follow up vasculitis panel.     #Diarrhea  Soft/liquid stool. No abdominal pain. C diff neg.   -Follow up     #Back pain:  We will hold methocarbamol for the time being     #History of osteogenesis imperfecta  -Follow up     Code: Full  FEN: Regular adult diet  Ppx: SCDs, Lovenox  Dispo: based on CSF result, either transfer to medicine vs discharge home.      Patient was seen and discussed with attending doctor, Dr. Rivers.    Anil Howard MD  Neurology PGY2  Pager: [phone]    Neurology Staff Addendum  I have seen and evaluated the patient today and discussed with the resident team and agree with the documentation.  Patient has a clean LP not suggestive of meningitis or encephalitis (even partially treated would be expected to have more striking changes).  Near normal neuro exam with elevated procalcitonin helps confirm patient appears to primarily have a medical (pneumonia/infection) problem and will consult our colleagues in medicine tomorrow.      SULAIMAN RIVERS MD

## 2018-12-25 NOTE — PHARMACY-VANCOMYCIN DOSING SERVICE
Pharmacy Vancomycin Note  Date of Service 2018  Patient's  1985   33 year old, male    Indication: Meningitis  Goal Trough Level: 15-20 mg/L  Day of Therapy: 2  Current Vancomycin regimen: 2000 mg IV q12h    Current estimated CrCl = Estimated Creatinine Clearance: 179.6 mL/min (based on SCr of 0.73 mg/dL).    Creatinine for last 3 days  2018:  6:32 PM Creatinine 0.86 mg/dL  2018: 12:01 PM Creatinine 0.73 mg/dL; 10:05 PM Creatinine 0.80 mg/dL  2018:  6:19 AM Creatinine 0.71 mg/dL  2018:  6:00 AM Creatinine 0.73 mg/dL    Recent Vancomycin Levels (past 3 days)  2018:  1:39 PM Vancomycin Level 6.0 mg/L - 10.4-hour trough    Vancomycin IV Administrations (past 72 hours)                   vancomycin (VANCOCIN) 2,000 mg in sodium chloride 0.9 % 500 mL intermittent infusion (mg) 2,000 mg New Bag 18 0315     2,000 mg New Bag 18 1202    vancomycin (VANCOCIN) 2,500 mg in sodium chloride 0.9 % 500 mL intermittent infusion (mg) 2,500 mg New Bag 18 2357                Nephrotoxins and other renal medications (From now, onward)    Start     Dose/Rate Route Frequency Ordered Stop    18 1200  vancomycin (VANCOCIN) 2,000 mg in sodium chloride 0.9 % 500 mL intermittent infusion      20 mg/kg × 102.1 kg  over 2 Hours Intravenous EVERY 12 HOURS 18 0500  acyclovir (ZOVIRAX) 1,000 mg in D5W 250 mL intermittent infusion      10 mg/kg × 102.1 kg Intravenous EVERY 8 HOURS 18 0036               Contrast Orders - past 72 hours (72h ago, onward)    Start     Dose/Rate Route Frequency Ordered Stop    18 2100  gadobutrol (GADAVIST) injection 10 mL      10 mL Intravenous ONCE 18 21018 [id number]  gadobutrol (GADAVIST) injection 10.21 mL      0.1 mL/kg × 102.1 kg Intravenous ONCE 18 1740 18 1814          Interpretation of levels and current regimen:  Trough level is Subtherapeutic. Last dose was given  ~3 hours late, and patient has only received 3 doses (including a loading dose), so patient is likely not at steady state. However, estimated true trough would still be significantly lower than goal of 15-20 mg/L. Given indication of meningitis, fever, elevated procalcitonin, tachycardia, and chills, will increase dose to aim for a higher trough.    Has serum creatinine changed > 50% in last 72 hours: No    Urine output:  unable to determine - not collecting    Renal Function: Stable    Plan:  1.  Increase Dose to 2000 mg (~20 mg/kg) IV every 8 hours  2.  Pharmacy will check trough levels as appropriate in 1-3 Days.    3. Serum creatinine levels will be ordered daily for the first week of therapy and at least twice weekly for subsequent weeks.      Bess Rubalcava, PharmD   Pager: [phone] (text capable)    12/25/2018

## 2018-12-25 NOTE — PROCEDURES
[facility] Procedure Note          Lumbar Puncture:      Time: 3:43 PM  Performed by: Dr. Hannah and Dr. Howard   Authorized by: Dr. Rivers    Indications: headache and abnormal neurologic exam    Consent given by: Patient who states understanding of the procedure being performed after discussing the risks, benefits and alternatives.    Prior to the start of the procedure and with procedural staff participation, I verbally confirmed the patient s identity using two indicators, relevant allergies, that the procedure was appropriate and matched the consent or emergent situation, and that the correct equipment/implants were available. Immediately prior to starting the procedure I conducted the Time Out with the procedural staff and re-confirmed the patient s name, procedure, and site/side. (The Joint Commission universal protocol was followed.) Yes    Under sterile conditions the patient was positioned R Lateral decubitus with knees drawn up. Betadine solution and sterile drapes were utilized.  Local anesthetic at the site: 2 ml of lidocaine 1% without epinephrine from the LP tray  A 21 G  spinal needle was inserted at the L 3-4 interspace.  Opening Pressurewas not checked.  A total of 20 mL of clear and colorless spinal fluid was obtained and sent to the laboratory.   After the needle was removed, a bandaid and pressure were applied and the patient was instructed to stay horizontal until the results were back.    Complications:  None    Patient tolerance: Patient tolerated the procedure well with no immediate complications.    Anil Howard  Neurology resident   Pager: [phone]    Neurology Staff Addendum  I have seen and evaluated the patient today and agreed with the need for the LP.  I was not present for the procedure.        SULAIMAN RIVERS MD

## 2018-12-26 LAB
ANA SER QL IF: NEGATIVE
ANION GAP SERPL CALCULATED.3IONS-SCNC: 9 MMOL/L (ref 3–14)
BUN SERPL-MCNC: 7 MG/DL (ref 7–30)
CALCIUM SERPL-MCNC: 8.3 MG/DL (ref 8.5–10.1)
CHLORIDE SERPL-SCNC: 104 MMOL/L (ref 94–109)
CO2 SERPL-SCNC: 24 MMOL/L (ref 20–32)
CREAT SERPL-MCNC: 0.69 MG/DL (ref 0.66–1.25)
CRP SERPL-MCNC: 220 MG/L (ref 0–8)
ENA SS-A IGG SER IA-ACNC: <0.2 AI (ref 0–0.9)
ENA SS-B IGG SER IA-ACNC: <0.2 AI (ref 0–0.9)
ERYTHROCYTE [DISTWIDTH] IN BLOOD BY AUTOMATED COUNT: 13.2 % (ref 10–15)
GFR SERPL CREATININE-BSD FRML MDRD: >90 ML/MIN/{1.73_M2}
GLUCOSE SERPL-MCNC: 95 MG/DL (ref 70–99)
HCT VFR BLD AUTO: 37.8 % (ref 40–53)
HGB BLD-MCNC: 12.7 G/DL (ref 13.3–17.7)
HIV 1+2 AB+HIV1 P24 AG SERPL QL IA: NONREACTIVE
HSV1 DNA CSF QL NAA+PROBE: NOT DETECTED
HSV2 DNA CSF QL NAA+PROBE: NOT DETECTED
INTERPRETATION ECG - MUSE: NORMAL
LACTATE BLD-SCNC: 0.9 MMOL/L (ref 0.7–2)
MCH RBC QN AUTO: 28.5 PG (ref 26.5–33)
MCHC RBC AUTO-ENTMCNC: 33.6 G/DL (ref 31.5–36.5)
MCV RBC AUTO: 85 FL (ref 78–100)
MICROBIOLOGIST REVIEW: NORMAL
MYELOPEROXIDASE AB SER-ACNC: <0.2 AI (ref 0–0.9)
PLATELET # BLD AUTO: 198 10E9/L (ref 150–450)
POTASSIUM SERPL-SCNC: 2.9 MMOL/L (ref 3.4–5.3)
POTASSIUM SERPL-SCNC: 3.8 MMOL/L (ref 3.4–5.3)
PROCALCITONIN SERPL-MCNC: 3.69 NG/ML
PROTEINASE3 IGG SER-ACNC: <0.2 AI (ref 0–0.9)
RBC # BLD AUTO: 4.45 10E12/L (ref 4.4–5.9)
SODIUM SERPL-SCNC: 137 MMOL/L (ref 133–144)
SPECIMEN TYPE: NORMAL
VARICELLA ZOSTER DNA PCR COMMENT: NORMAL
VZV DNA SPEC QL NAA+PROBE: NORMAL
WBC # BLD AUTO: 13.8 10E9/L (ref 4–11)

## 2018-12-26 PROCEDURE — 36415 COLL VENOUS BLD VENIPUNCTURE: CPT | Performed by: PSYCHIATRY & NEUROLOGY

## 2018-12-26 PROCEDURE — 25000132 ZZH RX MED GY IP 250 OP 250 PS 637: Performed by: INTERNAL MEDICINE

## 2018-12-26 PROCEDURE — 25000128 H RX IP 250 OP 636: Performed by: STUDENT IN AN ORGANIZED HEALTH CARE EDUCATION/TRAINING PROGRAM

## 2018-12-26 PROCEDURE — 99233 SBSQ HOSP IP/OBS HIGH 50: CPT | Mod: GC | Performed by: INTERNAL MEDICINE

## 2018-12-26 PROCEDURE — 94150 VITAL CAPACITY TEST: CPT

## 2018-12-26 PROCEDURE — 80048 BASIC METABOLIC PNL TOTAL CA: CPT | Performed by: PSYCHIATRY & NEUROLOGY

## 2018-12-26 PROCEDURE — 84132 ASSAY OF SERUM POTASSIUM: CPT | Performed by: PSYCHIATRY & NEUROLOGY

## 2018-12-26 PROCEDURE — 83605 ASSAY OF LACTIC ACID: CPT | Performed by: INTERNAL MEDICINE

## 2018-12-26 PROCEDURE — 86140 C-REACTIVE PROTEIN: CPT | Performed by: PSYCHIATRY & NEUROLOGY

## 2018-12-26 PROCEDURE — 25000132 ZZH RX MED GY IP 250 OP 250 PS 637: Performed by: PSYCHIATRY & NEUROLOGY

## 2018-12-26 PROCEDURE — 25000128 H RX IP 250 OP 636: Performed by: PSYCHIATRY & NEUROLOGY

## 2018-12-26 PROCEDURE — 25000132 ZZH RX MED GY IP 250 OP 250 PS 637: Performed by: STUDENT IN AN ORGANIZED HEALTH CARE EDUCATION/TRAINING PROGRAM

## 2018-12-26 PROCEDURE — 36415 COLL VENOUS BLD VENIPUNCTURE: CPT | Performed by: INTERNAL MEDICINE

## 2018-12-26 PROCEDURE — 84145 PROCALCITONIN (PCT): CPT | Performed by: PSYCHIATRY & NEUROLOGY

## 2018-12-26 PROCEDURE — 40000141 ZZH STATISTIC PERIPHERAL IV START W/O US GUIDANCE

## 2018-12-26 PROCEDURE — 87799 DETECT AGENT NOS DNA QUANT: CPT | Performed by: PSYCHIATRY & NEUROLOGY

## 2018-12-26 PROCEDURE — 85027 COMPLETE CBC AUTOMATED: CPT | Performed by: PSYCHIATRY & NEUROLOGY

## 2018-12-26 PROCEDURE — 12000008 ZZH R&B INTERMEDIATE UMMC

## 2018-12-26 RX ORDER — MAGNESIUM SULFATE HEPTAHYDRATE 40 MG/ML
4 INJECTION, SOLUTION INTRAVENOUS EVERY 4 HOURS PRN
Status: DISCONTINUED | OUTPATIENT
Start: 2018-12-26 | End: 2018-12-29 | Stop reason: HOSPADM

## 2018-12-26 RX ORDER — POTASSIUM CL/LIDO/0.9 % NACL 10MEQ/0.1L
10 INTRAVENOUS SOLUTION, PIGGYBACK (ML) INTRAVENOUS
Status: DISCONTINUED | OUTPATIENT
Start: 2018-12-26 | End: 2018-12-29 | Stop reason: HOSPADM

## 2018-12-26 RX ORDER — POTASSIUM CHLORIDE 750 MG/1
20-40 TABLET, EXTENDED RELEASE ORAL
Status: DISCONTINUED | OUTPATIENT
Start: 2018-12-26 | End: 2018-12-29 | Stop reason: HOSPADM

## 2018-12-26 RX ORDER — POTASSIUM CHLORIDE 29.8 MG/ML
20 INJECTION INTRAVENOUS
Status: DISCONTINUED | OUTPATIENT
Start: 2018-12-26 | End: 2018-12-29 | Stop reason: HOSPADM

## 2018-12-26 RX ORDER — POTASSIUM CHLORIDE 7.45 MG/ML
10 INJECTION INTRAVENOUS
Status: DISCONTINUED | OUTPATIENT
Start: 2018-12-26 | End: 2018-12-29 | Stop reason: HOSPADM

## 2018-12-26 RX ORDER — POTASSIUM CHLORIDE 1.5 G/1.58G
20-40 POWDER, FOR SOLUTION ORAL
Status: DISCONTINUED | OUTPATIENT
Start: 2018-12-26 | End: 2018-12-29 | Stop reason: HOSPADM

## 2018-12-26 RX ADMIN — IBUPROFEN 600 MG: 200 TABLET, FILM COATED ORAL at 00:26

## 2018-12-26 RX ADMIN — ACYCLOVIR SODIUM 1000 MG: 1000 INJECTION, SOLUTION INTRAVENOUS at 02:59

## 2018-12-26 RX ADMIN — IBUPROFEN 600 MG: 200 TABLET, FILM COATED ORAL at 12:38

## 2018-12-26 RX ADMIN — ACETAMINOPHEN 650 MG: 325 TABLET, FILM COATED ORAL at 00:26

## 2018-12-26 RX ADMIN — ENOXAPARIN SODIUM 40 MG: 100 INJECTION SUBCUTANEOUS at 22:16

## 2018-12-26 RX ADMIN — VANCOMYCIN HYDROCHLORIDE 2000 MG: 10 INJECTION, POWDER, LYOPHILIZED, FOR SOLUTION INTRAVENOUS at 14:19

## 2018-12-26 RX ADMIN — ACETAMINOPHEN 650 MG: 325 TABLET, FILM COATED ORAL at 20:00

## 2018-12-26 RX ADMIN — IBUPROFEN 600 MG: 200 TABLET, FILM COATED ORAL at 22:19

## 2018-12-26 RX ADMIN — POTASSIUM CHLORIDE 40 MEQ: 750 TABLET, EXTENDED RELEASE ORAL at 08:51

## 2018-12-26 RX ADMIN — IBUPROFEN 600 MG: 200 TABLET, FILM COATED ORAL at 18:18

## 2018-12-26 RX ADMIN — VANCOMYCIN HYDROCHLORIDE 2000 MG: 10 INJECTION, POWDER, LYOPHILIZED, FOR SOLUTION INTRAVENOUS at 07:19

## 2018-12-26 RX ADMIN — CEFTRIAXONE SODIUM 2 G: 2 INJECTION, POWDER, FOR SOLUTION INTRAMUSCULAR; INTRAVENOUS at 10:24

## 2018-12-26 RX ADMIN — ACETAMINOPHEN 650 MG: 325 TABLET, FILM COATED ORAL at 23:56

## 2018-12-26 RX ADMIN — ACETAMINOPHEN 650 MG: 325 TABLET, FILM COATED ORAL at 07:46

## 2018-12-26 RX ADMIN — ACETAMINOPHEN 650 MG: 325 TABLET, FILM COATED ORAL at 11:48

## 2018-12-26 RX ADMIN — IBUPROFEN 600 MG: 200 TABLET, FILM COATED ORAL at 08:28

## 2018-12-26 RX ADMIN — POTASSIUM CHLORIDE 40 MEQ: 750 TABLET, EXTENDED RELEASE ORAL at 13:20

## 2018-12-26 RX ADMIN — IBUPROFEN 600 MG: 200 TABLET, FILM COATED ORAL at 04:26

## 2018-12-26 RX ADMIN — ACETAMINOPHEN 650 MG: 325 TABLET, FILM COATED ORAL at 15:59

## 2018-12-26 ASSESSMENT — ACTIVITIES OF DAILY LIVING (ADL)
ADLS_ACUITY_SCORE: 12
ADLS_ACUITY_SCORE: 13
ADLS_ACUITY_SCORE: 12
ADLS_ACUITY_SCORE: 12
ADLS_ACUITY_SCORE: 13
ADLS_ACUITY_SCORE: 13

## 2018-12-26 NOTE — PROGRESS NOTES
[id number]: No changes from previous note. Temp was 98.2 this evening. Pt would like to stay on top of ibuprofen and tylenol overnight. Resting comfortably in between cares. Continue to monitor.

## 2018-12-26 NOTE — PROGRESS NOTES
Antimicrobial Stewardship Team Note    Antimicrobial Stewardship Program - A joint venture between [facility] and  Physicians to optimize antibiotic management.  NOT a formal consult - Restricted Antimicrobial Review     Patient: Jose Spears  MRN: [id number]  Allergies: Codeine and Morphine    Brief Summary: Jose Spears is a 33 year old man with PMH osteogenesis imperfecta admitted on 12/23/2018 with acute onset of generalized weakness, myalagias throughout his body with inability to walk that was proceeded with maculopapular rash about 5 days prior. PTA he went to urgent care for uticaria and given prednisone in which improved. On admission he was febrile (Tmax 102.4) normotensive on RA with leukocytosis. He denied HA, dizziness, sore throat, neck stiffness, and fevers. Initial concern for AIDP however in the ED he has worsening fevers and AMS therefore was started on empiric ceftriaxone, vancomycin, and acyclovir for empiric meningitis. Per neurology not concerned for AIDP given normal reflexes and now concern for infectious process. Pt refused LP on 12/24/2018 but underwent on 12/25/2018 on 2 days of ABx.    Imaging -   MRI spine degenerative changes but not features of active inflammatory demyelinating polyneuropathy  CT head w/o contrast - no acute intracranial pathology  MRA/MRI brain - no acute infarct or ICH, no aneurysm, no abnormal lesions,   CXR - LLL PNA and small pleural effusion    Micro -  Enteric panel neg  C. Diff neg  Influenza neg  CSF cell count diff: WBC 0, RBC 0, Colorless, Clear; Protein 16, Glucose 63  [place] IG and CSF PCR __  Polio __  SANJEEV virus __  Neisseria Antigen neg  Lyme PCR & IG __  Crypto CSF Ag neg  Coccidioides Ag __  Strep Pneumo Urine Ag neg  Kaela Ink Prep - no yeast  CMV PCR __  Cysticercosis IgG Ab __  Blasto Ag __  Bacterial/fungal CSF CX __  VZV CSF PCR neg  H. Influenzae CSF Ag __  HSV CSF PCR neg  Enterovirus CSF neg  VDRL CSF __  HIV Ag/Ab  __         Active Anti-infective Medications   (From admission, onward)                Start     Stop    12/25/18 2300  vancomycin (VANCOCIN) injection  20 mg/kg,   Intravenous,   EVERY 8 HOURS     Meningitis        --    12/24/18 0800  cefTRIAXone  2 g,   Intravenous,   EVERY 12 HOURS     Meningitis        --    12/24/18 0500  acyclovir (ZOVIRAX) injection  10 mg/kg,   Intravenous,   EVERY 8 HOURS     Herpes Simplex Encephalitis        --          Assessment:   Concern for meningitis/encephalitis. Remains on empiric ACV, ceftriaxone, vancomycin with ongoing fevers on APAP/Ibuprofen. Neuro status stable but c/o HA with episodes of chills, tachycardia, and tachypnea. CSF findings not c/w bacterial process however with there are concerns of bacterial PNA given findings on CXR with elevated procalcitonin. Given complex ID work up would recommend ID consult.    Recommendations:  1) ID Consult    Discussed with ID Staff: MD Abby Sam, PharmD, BCIDP  Antimicrobial Stewardship & Infectious Diseases Pharmacist  Office Ph: [phone]  Pager: [phone]    Vital Signs/Clinical Features:  Vitals       12/24 0700  -  12/25 0659 12/25 0700  -  12/26 0659 12/26 0700  -  12/26 1411   Most Recent    Temp ( F) 98 -  102.9    97.5 -  103.5    95.9 -  102.8     102.8 (39.3)    Pulse 54 -  89    84 -  95      98     98    Heart Rate 81 -  127    85 -  96      84     84    Resp 16 -  (!)32    16 -  20      18     18    /49 -  188/60    111/55 -  130/66      111/63     111/63    SpO2 (%) 94 -  99    91 -  100    97 -  100     100          Labs  Estimated Creatinine Clearance: 190 mL/min (based on SCr of 0.69 mg/dL).  Recent Labs   Lab Test 12/22/18  1832 12/23/18  1201 12/23/18  2205 12/24/18  0619 12/25/18  0600 12/26/18  0600   CR 0.86 0.73 0.80 0.71 0.73 0.69       Recent Labs   Lab Test 12/22/18  1832 12/23/18  1201  12/24/18  0619 12/24/18  2215 12/25/18  0600 12/26/18  0600   WBC 20.3* 18.4*  --  13.2*  13.0* 13.2* 13.8*   ANEU 16.4* 15.7*  --   --  10.5* 10.5*  --    ALYM 2.2 1.4  --   --  1.7 1.7  --    COLTON 1.4* 1.2  --   --  0.6 0.9  --    AEOS 0.1 0.1  --   --  0.1 0.1  --    HGB 15.4 15.9  --  14.6 13.7 12.9* 12.7*   HCT 46.2 47.4  --  44.5 40.2 38.5* 37.8*   MCV 85 86  --  87 85 85 85    250   < > 221 239 215 198    < > = values in this interval not displayed.       Recent Labs   Lab Test 05/13/14  0924 12/23/18  1201   BILITOTAL 0.8 1.1   ALKPHOS 51 108   ALBUMIN 4.4 3.4   AST 25 27   ALT 34 63       Recent Labs   Lab Test 12/22/18  1906 12/23/18  1350 12/24/18  2215 12/24/18  2216 12/25/18  0600 12/26/18  0600   PCAL  --   --   --  5.71 5.72 3.69   LACT 0.9 1.7  --  1.9  --   --    CRP  --   --   --  180.0*  --   --    SED  --   --  20*  --   --   --        Recent Labs   Lab Test 12/25/18  1339   VANCOMYCIN 6.0       Culture Results:  7-Day Micro Results     Procedure Component Value Units Date/Time    Sputum Culture Aerobic Bacterial     Order Status:  No result Lab Status:  No result     Specimen:  Sputum     Gram stain     Order Status:  No result Lab Status:  No result     Specimen:  Sputum     Glucose CSF: [[id number]] Collected:  12/25/18 1431    Order Status:  Completed Lab Status:  Final result Updated:  12/25/18 1616    Specimen:  CSF from Spine, Lumbar      Glucose CSF 63 mg/dL      Comment: CSF glucose concentrations are about 60 percent of normal plasma glucose.       Protein total CSF: [[id number]] Collected:  12/25/18 1431    Order Status:  Completed Lab Status:  Final result Updated:  12/25/18 1616    Specimen:  CSF from Spine, Lumbar      Protein Total CSF 16 mg/dL     Gram stain [[id number]] Collected:  12/25/18 1431    Order Status:  Completed Lab Status:  Final result Updated:  12/25/18 4245    Specimen:  Cerebrospinal fluid      Specimen Description Cerebrospinal fluid     Special Requests TUBE 2     Gram Stain No organisms seen      No WBC's seen      Quantification of host cells and  microbiological organisms was done on a cytocentrifuged   preparation.      CSF Culture Aerobic Bacterial [[id number]] Collected:  12/25/18 1431    Order Status:  Completed Lab Status:  Preliminary result Updated:  12/26/18 0808    Specimen:  Cerebrospinal fluid      Specimen Description Cerebrospinal fluid     Special Requests TUBE 2     Culture Micro Culture negative monitoring continues    Cell count with differential CSF: [[id number]] Collected:  12/25/18 1431    Order Status:  Completed Lab Status:  Edited Result - FINAL Updated:  12/25/18 1615    Specimen:  CSF from Spine, Lumbar      WBC CSF 0 /uL      RBC CSF 0 /uL      Tube Number 4 #      Color CSF Colorless     Appearance CSF Clear    VDRL CSF: [[id number]] Collected:  12/25/18 1431    Order Status:  Completed Lab Status:  In process Updated:  12/25/18 1543    Specimen:  CSF from Spine, Lumbar     Anaerobic CSF culture [[id number]] Collected:  12/25/18 1431    Order Status:  Completed Lab Status:  Preliminary result Updated:  12/26/18 0935    Specimen:  Cerebrospinal fluid      Specimen Description Cerebrospinal fluid     Special Requests --     Not received in an anaerobic transport container.  TUBE 2       Culture Micro Culture negative monitoring continues    Enterovirus PCR CSF [[id number]] Collected:  12/25/18 1431    Order Status:  Completed Lab Status:  Final result Updated:  12/25/18 1958    Specimen:  Cerebrospinal fluid      Specimen Description Cerebrospinal fluid     Enterovirus CSF PCR Negative     Comment: FDA approved assay performed using [employer] GeneXpert real-time PCR.  This assay is a reverse transcription polymerase chain reaction (RT-PCR) for   presumptive qualitative detection of Enterovirus RNA in CSF of patients with   signs and symptoms of meningitis or meningoencephalitis. It does not rule out   other causes of meningitis or other viruses.  Enterovirus (EV) includes Coxsackieviruses, Echoviruses, Polioviruses and   newer types designated as  Enteroviruses. The assay is designed to detect a   consensus region of the EV genome 5 untranslated region (UTR) between   nucleotides 452 and 596.  Negative EV results do not exclude Enterovirus as a cause of meningitis, but   that Enterovirus was not detected. Positive EV results do not exclude other   causes of meningitis.         HSV Types 1 and 2 Qualitative PCR CSF: [[id number]] Collected:  12/25/18 1431    Order Status:  Completed Lab Status:  Final result Updated:  12/26/18 0947    Specimen:  Cerebrospinal fluid from Spine, Lumbar      Herpes Simplex Type 1 CSF Not Detected     Comment: Herpes simplex virus Type 1 DNA NOT detected, presumed negative for HSV-1. A   negative result does not rule out the presence of PCR inhibitors or HSV DNA in   concentrations below the limit of detection of the assay.          Herpes Simplex Type 2 CSF Not Detected     Comment: Herpes simplex virus Type 2 DNA NOT detected, presumed negative for HSV-2. A   negative result does not rule out the presence of PCR inhibitors or HSV DNA in   concentrations below the limit of detection of the assay.          HSV CSF Comment --     The [employer] Simplexa HSV 1 & 2 Direct assay is a FDA approved, real-time PCR   test for the qualitative detection and differentiation of Herpes Simplex virus Type 1 & 2   DNA in cerebrospinal fluid (CSF).  Testing is performed by the Infectious Diseases   Diagnostic Laboratory at the [facility].      H Influenzae antigen [[id number]] Collected:  12/25/18 1431    Order Status:  Completed Lab Status:  Final result Updated:  12/25/18 2336    Specimen:  Cerebrospinal fluid      Specimen Description Cerebrospinal fluid     BAD H influenzae No Haemophilus influenzae type B antigen detected by latex agglutination.    Lyme IgG and IgM CSF Immunoblot: [[id number]] Collected:  12/25/18 1431    Order Status:  Completed Lab Status:  In process Updated:  12/25/18 5822    Specimen:   CSF from Spine, Lumbar     Varicella Zoster DNA PCR CSF or Skin Swab (1 mL minimum) [[id number]] Collected:  12/25/18 1431    Order Status:  Completed Lab Status:  Final result Updated:  12/26/18 1312    Specimen:  Cerebrospinal fluid from Spine, Lumbar      Varicella Zoster DNA PCR Specimen Type Cerebrospinal fluid     Varicella Zoster DNA PCR Result VZV DNA Not Detected, presumed negative for Varicella zoster virus.     Comment: A negative result does not rule out the presence of PCR inhibitors or VZV DNA   in concentrations below the limit of detection of the assay.                                                                                        Varicella Zoster DNA PCR Comment See Comment Below     Comment:    The qualitative Varicella zoster virus DNA assay is a real-time polymerase   chain reaction (PCR) utilizing analyte specific reagents manufactured by [employer]. This test was developed and its performance characteristics   determined by the [facility] Infectious Diseases   Diagnostic Clinical Laboratory.  Analyte Specific Reagents (ASRs) are used in many laboratory tests necessary   for standard medical care and generally do not require FDA approval. The FDA   has determined that such clearance is not necessary. This test is used for   clinical purposes.  It should not be regarded as investigational or for   research.  This Laboratory is certified under the Clinical Laboratory Improvement   Amendments of 1988 (CLIA-88) as qualified to perform high complexity clinical   laboratory testing.         Glucose CSF: [[id number]] Collected:  12/25/18 1431    Order Status:  Canceled Lab Status:  No result Updated:  12/25/18 1549    Specimen:  CSF from Spine, Lumbar     Protein total CSF: [[id number]] Collected:  12/25/18 1431    Order Status:  Canceled Lab Status:  No result Updated:  12/25/18 1549    Specimen:  CSF from Spine, Lumbar     Cell count with differential CSF: [[id number]] Collected:   12/25/18 1431    Order Status:  Canceled Lab Status:  No result Updated:  12/25/18 1549    Specimen:  CSF from Spine, Lumbar     Fungus Culture, non-blood Tube 2 [[id number]] Collected:  12/25/18 1431    Order Status:  Completed Lab Status:  Preliminary result Updated:  12/26/18 1120    Specimen:  Cerebrospinal fluid      Specimen Description Cerebrospinal fluid     Special Requests TUBE 2     Culture Micro Culture negative after 18 hours    HSV Types 1 and 2 Qualitative PCR CSF: [[id number]] Collected:  12/25/18 1431    Order Status:  Canceled Lab Status:  No result Updated:  12/25/18 1549    Specimen:  CSF from Spine, Lumbar     Blastomyces Agn Quant EIA Non Blood CSF Tube 3 [[id number]] Collected:  12/25/18 1431    Order Status:  Completed Lab Status:  In process Updated:  12/25/18 1545    Specimen:  CSF     Cysticercosis antibody IgG CSF: [[id number]] Collected:  12/25/18 1431    Order Status:  Completed Lab Status:  In process Updated:  12/25/18 1543    Specimen:  CSF from Spine, Lumbar     Cytomegalovirus Quant PCR Non Blood Tube 3 [[id number]] Collected:  12/25/18 1431    Order Status:  Completed Lab Status:  In process Updated:  12/25/18 1545    Specimen:  CSF from Spine, Lumbar     Immunoglobulin G CSF Index: [[id number]] Collected:  12/25/18 1431    Order Status:  Canceled Lab Status:  No result Updated:  12/25/18 1545    Specimen:  CSF from Spine, Lumbar     Kaela ink prep CSF: [[id number]] Collected:  12/25/18 1431    Order Status:  Completed Lab Status:  Final result Updated:  12/25/18 1809    Specimen:  Cerebrospinal fluid from Spine, Lumbar      Specimen Description Cerebrospinal fluid     Special Requests TUBE 2     Kaela Ink Prep No encapsulated yeast seen    Lyme IgG and IgM CSF Immunoblot: [[id number]] Collected:  12/25/18 1431    Order Status:  Canceled Lab Status:  No result Updated:  12/25/18 1549    Specimen:  CSF from Spine, Lumbar     VDRL CSF: [[id number]] Collected:  12/25/18 1431    Order Status:  Canceled Lab Status:  No  result Updated:  12/25/18 1549    Specimen:  CSF from Spine, Lumbar     [place] Virus IgG and IgM CSF: [[id number]] Collected:  12/25/18 1431    Order Status:  Completed Lab Status:  In process Updated:  12/25/18 1543    Specimen:  CSF from Spine, Lumbar     Strep pneumo Agn Ur less than 13yrs or CSF any age Tube 2 [[id number]] Collected:  12/25/18 1431    Order Status:  Completed Lab Status:  Final result Updated:  12/25/18 2337    Specimen:  Cerebrospinal fluid      Specimen Description Cerebrospinal fluid     BAD S pneumoniae No Streptococcus pneumoniae antigen detected by latex agglutination.    Gram stain CSF Tube 2 [[id number]] Collected:  12/25/18 1430    Order Status:  Canceled Lab Status:  No result     Specimen:  CSF     CSF Culture Aerobic Bacterial Tube 2 [[id number]] Collected:  12/25/18 1430    Order Status:  Canceled Lab Status:  No result     Specimen:  CSF     Anaerobic CSF culture Tube 2 [[id number]] Collected:  12/25/18 1430    Order Status:  Canceled Lab Status:  No result     Specimen:  CSF     Enterovirus PCR CSF [[id number]] Collected:  12/25/18 1430    Order Status:  Canceled Lab Status:  No result     Specimen:  CSF     Varicella Zoster DNA PCR CSF or Skin Swab Tube 3 [[id number]] Collected:  12/25/18 1430    Order Status:  Canceled Lab Status:  No result     Specimen:  CSF from Spine, Lumbar     Cell count with differential CSF: [[id number]] Collected:  12/25/18 1200    Order Status:  Canceled Lab Status:  No result     Specimen:  CSF     C difficile culture [[id number]] Collected:  12/25/18 0615    Order Status:  Canceled Lab Status:  No result Updated:  12/25/18 0627    Specimen:  Stool     Enteric Bacteria and Virus Panel by SAHIL Stool [[id number]] Collected:  12/25/18 0615    Order Status:  Completed Lab Status:  Final result Updated:  12/25/18 0917    Specimen:  Feces      Campylobacter group by SAHIL Not Detected     Salmonella species by SAHIL Not Detected     Shigella species by SAHIL Not Detected     Vibrio group by SAHIL Not  Detected     Rotavirus A by SAHIL Not Detected     Shiga toxin 1 gene by SAHIL Not Detected     Shiga toxin 2 gene by SAHIL Not Detected     Norovirus I and II by SAHIL Not Detected     Yersinia enterocolitica by SAHIL Not Detected     Enteric pathogen comment --     Testing performed by multiplexed, qualitative PCR using the [employer] Enteric   Pathogens Nucleic Acid Test. Results should not be used as the sole basis for diagnosis,   treatment, or other patient management decisions.       Comment: Positive results do not rule out co-infection with other organisms that are   not detected by this test, and may not be the sole or definitive cause of   patient illness.   Negative results in the setting of clinical illness compatible with   gastroenteritis may be due to infection by pathogens that are not detected by   this test or non-infectious causes such as ulcerative colitis, irritable bowel   syndrome, or Crohn's disease.   Note: Shiga toxin producing E. coli (STEC) typically harbor one or both genes   that encode for Shiga toxins 1 and 2.         Clostridium difficile toxin B PCR [[id number]] Collected:  12/25/18 0615    Order Status:  Completed Lab Status:  Final result Updated:  12/25/18 0719    Specimen:  Feces      Specimen Description Feces     C Diff Toxin B PCR Negative     Comment: Negative: Clostridium difficile target DNA sequences NOT detected, presumed   negative for Clostridium difficile toxin B or the number of bacteria present   may be below the limit of detection for the test.  FDA approved assay performed using [employer] GeneXpert real-time PCR.  A negative result does not exclude actual disease due to Clostridium difficile   and may be due to improper collection, handling and storage of the specimen   or the number of organisms in the specimen is below the detection limit of the   assay.         Blood culture [[id number]] Collected:  12/23/18 0005    Order Status:  Completed Lab Status:  Preliminary  result Updated:  12/26/18 0653    Specimen:  Blood      Specimen Description Blood Left Hand     Special Requests Received in aerobic bottle only     Culture Micro No growth after 3 days    Blood culture [[id number]] Collected:  12/23/18 1400    Order Status:  Completed Lab Status:  Preliminary result Updated:  12/26/18 0653    Specimen:  Blood from Arm, Right      Specimen Description Blood Right Arm     Special Requests Received in aerobic bottle only     Culture Micro No growth after 3 days    Blood culture     Order Status:  Canceled Lab Status:  No result     Specimen:  Blood     Blood culture [[id number]] Collected:  12/22/18 1905    Order Status:  Completed Lab Status:  Preliminary result Updated:  12/26/18 0653    Specimen:  Blood      Specimen Description Blood Right Arm     Special Requests Aerobic and anaerobic bottles received     Culture Micro No growth after 4 days    Blood culture [[id number]] Collected:  12/22/18 1901    Order Status:  Completed Lab Status:  Preliminary result Updated:  12/26/18 0653    Specimen:  Blood      Specimen Description Blood Left Arm     Special Requests Aerobic and anaerobic bottles received     Culture Micro No growth after 4 days    Blood culture     Order Status:  Canceled Lab Status:  No result     Specimen:  Blood     Influenza A/B antigen [[id number]] Collected:  12/22/18 1825    Order Status:  Completed Lab Status:  Edited Result - FINAL Updated:  12/22/18 1913    Specimen:  Nasopharyngeal      Influenza A/B Agn Specimen Nasopharyngeal     Influenza A Negative     Influenza B Negative     Comment: Test results must be correlated with clinical data. If necessary, results   should be confirmed by a molecular assay or viral culture.               Recent Labs   Lab Test 12/23/18  1324 12/25/18  1415   URINEPH 6.0 6.0   NITRITE Negative Negative   LEUKEST Negative Negative   WBCU 1 0       Recent Labs   Lab Test 12/25/18  1431   CWBC 0   CRBC 0   CGLU 63   CTP 16             Recent Labs    Lab Test 12/25/18  0615   CDBPCT Negative       Imaging: Xr Chest 2 Views    Result Date: 12/23/2018  XR CHEST 2 VW  12/23/2018 8:39 PM  HISTORY: Chest pain, shortness of breath COMPARISON: 3/30/2009 FINDINGS: PA and lateral views of the chest. Cardia mediastinal silhouette is stable and within normal limits. Left lower lobe consolidation best appreciated on the lateral view posteriorly with a small left pleural effusion. Visualized upper abdomen is unremarkable. No acute bony normality.     IMPRESSION: Left lower lobe pneumonia and small left pleural effusion. I have personally reviewed the examination and initial interpretation and I agree with the findings. KOREY RICHARDSON MD    Mra Brain ([place]) Wo Contrast    Result Date: 12/25/2018  MRI brain without and with contrast MRA of the head without contrast Provided History:  Vasculitis, CNS. Comparison:  CT head 12/23/2018  Technique: Brain MRI:  Axial diffusion, FLAIR, T2-weighted, susceptibility, and coronal T1-weighted images were obtained without intravenous contrast. Following intravenous gadolinium-based contrast administration, axial and coronal T1-weighted images were obtained.  Head MRA: 3D time-of-flight MRA of the [place] was performed without intravenous contrast. Dose: 10mL Gadavist Findings: Brain MRI: No restricted diffusion. Multiple scattered small foci of subcortical and deep cerebral white matter T2 hyperintensity. There is no intracranial hemorrhage on susceptibility images. Ventricles are proportionate to the cerebral sulci. Contrast-enhanced images of the brain demonstrate no abnormal intra- or extra-axial enhancement. The lucent lesion in the left parietal bone seen on the comparison head CT is isointense to CSF on all sequences and demonstrates no abnormal enhancement. Head MRA demonstrates no aneurysm or stenosis of the major intracranial arteries.     Impression: 1. No acute infarct or intracranial hemorrhage. 2. No  abnormal enhancing lesions intracranially. 3. Cerebral white matter changes out of proportion to the patient's age. These may reflect an underlying vasculopathy or relate to prior infectious or inflammatory insult(s). 4. Head MRA demonstrates no aneurysm or stenosis of the major intracranial arteries. 5. Left parietal bone prominent intraosseous arachnoid granulation. I have personally reviewed the examination and initial interpretation and I agree with the findings. ADALBERTO CAPONE MD    Mr Brain W/o & W Contrast    Result Date: 12/25/2018  MRI brain without and with contrast MRA of the head without contrast Provided History:  Vasculitis, CNS. Comparison:  CT head 12/23/2018  Technique: Brain MRI:  Axial diffusion, FLAIR, T2-weighted, susceptibility, and coronal T1-weighted images were obtained without intravenous contrast. Following intravenous gadolinium-based contrast administration, axial and coronal T1-weighted images were obtained.  Head MRA: 3D time-of-flight MRA of the [place] was performed without intravenous contrast. Dose: 10mL Gadavist Findings: Brain MRI: No restricted diffusion. Multiple scattered small foci of subcortical and deep cerebral white matter T2 hyperintensity. There is no intracranial hemorrhage on susceptibility images. Ventricles are proportionate to the cerebral sulci. Contrast-enhanced images of the brain demonstrate no abnormal intra- or extra-axial enhancement. The lucent lesion in the left parietal bone seen on the comparison head CT is isointense to CSF on all sequences and demonstrates no abnormal enhancement. Head MRA demonstrates no aneurysm or stenosis of the major intracranial arteries.     Impression: 1. No acute infarct or intracranial hemorrhage. 2. No abnormal enhancing lesions intracranially. 3. Cerebral white matter changes out of proportion to the patient's age. These may reflect an underlying vasculopathy or relate to prior infectious or inflammatory  insult(s). 4. Head MRA demonstrates no aneurysm or stenosis of the major intracranial arteries. 5. Left parietal bone prominent intraosseous arachnoid granulation. I have personally reviewed the examination and initial interpretation and I agree with the findings. ADALBERTO CAPONE MD    Mr Cervical Spine W/o & W Contrast    Result Date: 12/25/2018  MR CERVICAL SPINE W/O & W CONTRAST 12/24/2018 8:55 PM Provided History: C-spine trauma, myelopathy Comparison: None available. Technique: Sagittal T1-weighted, sagittal T2-weighted, sagittal diffusion weighted, axial T2-weighted, and axial T2* gradient echo images of the cervical spine were obtained without intravenous contrast. Following intravenous administration of gadolinium, axial and sagittal T1-weighted images with fat saturation were also obtained. Contrast: 10mL Gadavist Findings: The cervical vertebrae are normally aligned.  There is no disc height narrowing at any level.  There is normal signal within and normal contour of the cervical spinal cord.  There is no abnormal contrast enhancement within the cervical spinal cord, thecal sac or vertebral column.  The findings on a level by level basis are as follows: C2-3:  No spinal canal or neural foraminal narrowing. C3-4:  No spinal canal or neural foraminal narrowing. C4-5:  No spinal canal or neural foraminal narrowing. C5-6:  No spinal canal or neural foraminal narrowing. C6-7:  No spinal canal or neural foraminal narrowing. C7-T1:  No spinal canal or neural foraminal narrowing.  No abnormality of the paraspinous soft tissues.     Impression: 1. No myelopathy. No abnormal enhancement. 2. No spinal canal or neural foraminal narrowing. I have personally reviewed the examination and initial interpretation and I agree with the findings. ADALBERTO CAPONE MD    Lumbar Spine Mri W & W/o Contrast - Surgery <10yrs    Result Date: 12/23/2018  MR LUMBAR SPINE W/O & W CONTRAST 12/23/2018 6:58 PM Provided History: Back pain,  rapidly progressive neuro deficit; diffuse weakness, GBS variant?. Comparison: Radiographs 9/20/2015 Technique: Sagittal T1-weighted and T2-weighted, STIR, diffusion weighted, and axial T2-weighted images of the lumbar spine were obtained without intravenous contrast. Post intravenous contrast using gadolinium axial and sagittal T1-weighted images were obtained with fat saturation. Contrast: 10CC GADAVIST Findings: There are 5 lumbar-type vertebrae assumed for the purposes of this dictation.  The tip of the conus medullaris is at lower L1. Normal lumbar alignment. Mild loss of disc height and disc dehydration at L3-4 and L4-5. On a level by level basis: L1-2: No significant spinal canal or foraminal narrowing. L2-3: Small central disc protrusion and annular fissuring. No spinal canal stenosis. No neural foraminal stenosis. L3-4: Small left central disc protrusion and annular fissuring. Bilateral facet hypertrophy. Mild spinal canal narrowing and partial effacement of the left lateral recess without nerve root impingement. No neural foraminal stenosis. L4-5: Small central disc protrusion and annular fissuring. Bilateral facet hypertrophy. Mild spinal canal narrowing. Mild left neural foraminal narrowing. No right neural foraminal stenosis. L5-S1: Small central disc protrusion. Bilateral facet hypertrophy. Mild bilateral neural foraminal narrowing. No spinal canal stenosis. No abnormal enhancement.     Impression: 1. Lumbar degenerative changes with mild spinal canal and neural foraminal narrowing at lower lumbar levels. 2. No imaging features of active inflammatory demyelinating polyneuropathy.  I have personally reviewed the examination and initial interpretation and I agree with the findings. ADALBERTO CAPONE MD    Ct Head W/o Contrast    Result Date: 12/23/2018  CT HEAD W/O CONTRAST 12/23/2018 9:02 PM Provided History: Neuro deficit(s), subacute; new delerium, eval intracranial mass before LP Comparison: None.  Technique: Using multidetector thin collimation helical acquisition technique, axial, coronal and sagittal CT images from the skull base to the vertex were obtained without intravenous contrast. Findings:  No intracranial hemorrhage, mass effect, or midline shift. The ventricles are proportionate to the cerebral sulci. The gray to white matter differentiation of the cerebral hemispheres is preserved. The basal cisterns are patent. The visualized paranasal sinuses are clear. The mastoid air cells are clear. There is an incidentally noted solitary lytic lesion of the left parietal bone measuring 3.2 cm with greater involvement of the inner cortex and central fat attenuation. No apparent soft tissue component is demonstrated.      Impression: 1. No acute intracranial pathology. 2. Solitary lytic lesion of the left parietal bone, likely a hemangioma. 3. No contraindication to lumbar puncture. I have personally reviewed the examination and initial interpretation and I agree with the findings. ADALBERTO CAPONE MD

## 2018-12-26 NOTE — PLAN OF CARE
Max T: 102.8 (Ax); MDs notified. Pt is now medicine Gold service. PIV TKO b/t IV abx. New IV being placed at this time. Will restart vanco after placement. Mom at bedside and supportive with cares. Up ind in room. Voiding spnt. Still needs sputum sample- pt aware. K+ replaced; recheck ordered for this evening. Continue with POC.

## 2018-12-26 NOTE — PLAN OF CARE
4 pm -7 pm  Pt is alert and oriented x4, pt had a temp of 102.7 orally that did not come down with tylenol 650 mg tab po, pt received later ibuprofen 600 mg tab po that brought temp down to 100.9 ax, Charge RN, Ivana stated that she reported neuro cross cover regarding pt's temp.Pt also C/O headache when he had temp of 102.7 orally, headache relieved with tylenol 650 mg tab po.Pt had LP this afternoon about 2:30 pm per pt, site dressing is clean and dry, pt was on flat bed rest until 6:30 pm per Md order. Pt was able to sit and eat dinner after 6;30 pm.Uses urinal in the bathroom, voided large amount.Mother is at bed side. Continue with plan of care.

## 2018-12-26 NOTE — PLAN OF CARE
/68 (BP Location: Right arm)   Pulse 92   Temp 97.5  F (36.4  C) (Oral)   Resp 18   Ht 1.829 m (6')   Wt 102.1 kg (225 lb)   SpO2 99%   BMI 30.52 kg/m      Shift: [id number]   Status: Admitted for subacute onset of generalized weakness, fever, myalgia proceeded by hives for 5 days. On IV ABX and antivirals. Febrile overnight.   VS: T-max: 103.5, MD ballesteros. Temperature now stable. Tachycardic.  Neuros: A/Ox4, able to make needs known. Neuros unchanged- intact.   GI: Reg diet, 1 BM tonight.  : Voiding spontaneously and w/o difficulty.   Drains/Lines: PIV TKO  Activity: Pt up ad pepper.  Pain/Nausea: Denies nausea. C/o of headache- Tylenol and Ibuprofen given.?   Skin: Rash to BUE  Plan of care: Pending CSF workup and meningitis/encephalitis R/O. Will continue to monitor and follow POC.

## 2018-12-27 LAB
ANION GAP SERPL CALCULATED.3IONS-SCNC: 10 MMOL/L (ref 3–14)
BASOPHILS # BLD AUTO: 0 10E9/L (ref 0–0.2)
BASOPHILS NFR BLD AUTO: 0.1 %
BUN SERPL-MCNC: 12 MG/DL (ref 7–30)
CALCIUM SERPL-MCNC: 8.6 MG/DL (ref 8.5–10.1)
CHLORIDE SERPL-SCNC: 106 MMOL/L (ref 94–109)
CO2 SERPL-SCNC: 19 MMOL/L (ref 20–32)
COPATH REPORT: NORMAL
COPATH REPORT: NORMAL
CREAT SERPL-MCNC: 0.67 MG/DL (ref 0.66–1.25)
CRP SERPL-MCNC: 300 MG/L (ref 0–8)
DIFFERENTIAL METHOD BLD: ABNORMAL
EOSINOPHIL # BLD AUTO: 0.2 10E9/L (ref 0–0.7)
EOSINOPHIL NFR BLD AUTO: 1.6 %
ERYTHROCYTE [DISTWIDTH] IN BLOOD BY AUTOMATED COUNT: 13.6 % (ref 10–15)
GFR SERPL CREATININE-BSD FRML MDRD: >90 ML/MIN/{1.73_M2}
GLUCOSE SERPL-MCNC: 85 MG/DL (ref 70–99)
HCT VFR BLD AUTO: 38.3 % (ref 40–53)
HGB BLD-MCNC: 12.7 G/DL (ref 13.3–17.7)
IMM GRANULOCYTES # BLD: 0.1 10E9/L (ref 0–0.4)
IMM GRANULOCYTES NFR BLD: 0.5 %
LYMPHOCYTES # BLD AUTO: 0.9 10E9/L (ref 0.8–5.3)
LYMPHOCYTES NFR BLD AUTO: 7.6 %
MCH RBC QN AUTO: 28.2 PG (ref 26.5–33)
MCHC RBC AUTO-ENTMCNC: 33.2 G/DL (ref 31.5–36.5)
MCV RBC AUTO: 85 FL (ref 78–100)
MONOCYTES # BLD AUTO: 0.6 10E9/L (ref 0–1.3)
MONOCYTES NFR BLD AUTO: 4.8 %
NEUTROPHILS # BLD AUTO: 10 10E9/L (ref 1.6–8.3)
NEUTROPHILS NFR BLD AUTO: 85.4 %
NRBC # BLD AUTO: 0 10*3/UL
NRBC BLD AUTO-RTO: 0 /100
PLATELET # BLD AUTO: 188 10E9/L (ref 150–450)
POTASSIUM SERPL-SCNC: 3.4 MMOL/L (ref 3.4–5.3)
PROCALCITONIN SERPL-MCNC: 2.53 NG/ML
RBC # BLD AUTO: 4.5 10E12/L (ref 4.4–5.9)
RETICS # AUTO: 18 10E9/L (ref 25–95)
RETICS/RBC NFR AUTO: 0.4 % (ref 0.5–2)
SODIUM SERPL-SCNC: 135 MMOL/L (ref 133–144)
T PALLIDUM AB SER QL: NONREACTIVE
VDRL CSF QL: NON REACTIVE
WBC # BLD AUTO: 11.8 10E9/L (ref 4–11)
WNV IGG CSF-ACNC: 0.03 IV
WNV IGM CSF-ACNC: 0 IV

## 2018-12-27 PROCEDURE — 86780 TREPONEMA PALLIDUM: CPT | Performed by: STUDENT IN AN ORGANIZED HEALTH CARE EDUCATION/TRAINING PROGRAM

## 2018-12-27 PROCEDURE — 85045 AUTOMATED RETICULOCYTE COUNT: CPT | Performed by: STUDENT IN AN ORGANIZED HEALTH CARE EDUCATION/TRAINING PROGRAM

## 2018-12-27 PROCEDURE — 12000008 ZZH R&B INTERMEDIATE UMMC

## 2018-12-27 PROCEDURE — 85025 COMPLETE CBC W/AUTO DIFF WBC: CPT | Performed by: STUDENT IN AN ORGANIZED HEALTH CARE EDUCATION/TRAINING PROGRAM

## 2018-12-27 PROCEDURE — 84145 PROCALCITONIN (PCT): CPT | Performed by: STUDENT IN AN ORGANIZED HEALTH CARE EDUCATION/TRAINING PROGRAM

## 2018-12-27 PROCEDURE — 25000132 ZZH RX MED GY IP 250 OP 250 PS 637: Performed by: STUDENT IN AN ORGANIZED HEALTH CARE EDUCATION/TRAINING PROGRAM

## 2018-12-27 PROCEDURE — 25000132 ZZH RX MED GY IP 250 OP 250 PS 637: Performed by: INTERNAL MEDICINE

## 2018-12-27 PROCEDURE — 86140 C-REACTIVE PROTEIN: CPT | Performed by: STUDENT IN AN ORGANIZED HEALTH CARE EDUCATION/TRAINING PROGRAM

## 2018-12-27 PROCEDURE — 36415 COLL VENOUS BLD VENIPUNCTURE: CPT | Performed by: STUDENT IN AN ORGANIZED HEALTH CARE EDUCATION/TRAINING PROGRAM

## 2018-12-27 PROCEDURE — 99233 SBSQ HOSP IP/OBS HIGH 50: CPT | Mod: GC | Performed by: INTERNAL MEDICINE

## 2018-12-27 PROCEDURE — 40000611 ZZHCL STATISTIC MORPHOLOGY W/INTERP HEMEPATH TC 85060: Performed by: STUDENT IN AN ORGANIZED HEALTH CARE EDUCATION/TRAINING PROGRAM

## 2018-12-27 PROCEDURE — 87581 M.PNEUMON DNA AMP PROBE: CPT | Performed by: STUDENT IN AN ORGANIZED HEALTH CARE EDUCATION/TRAINING PROGRAM

## 2018-12-27 PROCEDURE — 80048 BASIC METABOLIC PNL TOTAL CA: CPT | Performed by: STUDENT IN AN ORGANIZED HEALTH CARE EDUCATION/TRAINING PROGRAM

## 2018-12-27 PROCEDURE — 94150 VITAL CAPACITY TEST: CPT

## 2018-12-27 RX ADMIN — ACETAMINOPHEN 650 MG: 325 TABLET, FILM COATED ORAL at 16:02

## 2018-12-27 RX ADMIN — ACETAMINOPHEN 650 MG: 325 TABLET, FILM COATED ORAL at 20:01

## 2018-12-27 RX ADMIN — IBUPROFEN 600 MG: 200 TABLET, FILM COATED ORAL at 22:56

## 2018-12-27 RX ADMIN — ACETAMINOPHEN 650 MG: 325 TABLET, FILM COATED ORAL at 04:07

## 2018-12-27 RX ADMIN — ACETAMINOPHEN 650 MG: 325 TABLET, FILM COATED ORAL at 11:57

## 2018-12-27 RX ADMIN — IBUPROFEN 600 MG: 200 TABLET, FILM COATED ORAL at 14:06

## 2018-12-27 RX ADMIN — ACETAMINOPHEN 650 MG: 325 TABLET, FILM COATED ORAL at 07:51

## 2018-12-27 RX ADMIN — IBUPROFEN 600 MG: 200 TABLET, FILM COATED ORAL at 03:28

## 2018-12-27 RX ADMIN — IBUPROFEN 600 MG: 200 TABLET, FILM COATED ORAL at 10:06

## 2018-12-27 RX ADMIN — IBUPROFEN 600 MG: 200 TABLET, FILM COATED ORAL at 18:00

## 2018-12-27 ASSESSMENT — ACTIVITIES OF DAILY LIVING (ADL)
ADLS_ACUITY_SCORE: 12

## 2018-12-27 ASSESSMENT — VISUAL ACUITY: OU: BASELINE;GLASSES

## 2018-12-27 NOTE — PROGRESS NOTES
Internal Medicine Brief Plan Update  [facility], [place]  Date of Admission: December 26, 2018    Plan updates 12/27/18:    Still febrile last evening again then defervesced and feeling better again this morning     CRP increased 220-300    Still holding antibiotics given less concern for infectious process, will restart if clinically worsening, but do expect some continued fever    May consider dermatology consult for rash and possible biopsy tomorrow if no improvement         -----------------------------------------------------------------  Assessment & Plan   Jose Spears is a 33 year old male with history of osteogenesis imperfecta who presents with subacute onset of generalized weakness, fever, myalgia and maculopapular rash. Received about 5 days course of steroid when he had a rash bc the thoughts of hives. His hemodynamics still fluctuating in term of developing fever and tachycardia. Now off antibiotics/antivrial. CXR with L lobar consolidation, procal 5.7, though  unlikely to be AIDP by neuro with clean LP.     Persistent Fevers, Rash, Acute Onset Weakness  Presented with fever, acute onset weakness and tachycardia to neuro. Presented initially for concern of AIDP, encephalitis or meningitis, now transferred to medicine with clean LP, normal reflexes, normal strength and no further indications of these etiologies while continuing to spike fevers on broad spectrum antibiotics.    Differential includes infection, serum sickness reaction, vasculitis, malignancy, connective tissue disease, genetic etiologies. Infectious etiologies most concerning are viral versus atypical bacterial versus abscess versus dental infection (in setting of recent procedure, but less likely given lack of tooth/jaw pain) versus endocarditis (only 1 minor [facility] criteria). Less likely sources include LLL pneumonia, UTI, wounds (none known), IVDU (none reported), Tic-borne illnesses, fungal infections.  Perhaps most likely currently given lack of improvement on 5 days antibiotics may be a serum sickness or other drug reaction (was given amoxicillin after dental procedure immediately preceding symptoms).    Dx  - [\] BCx repeat when spikes  - Trend fever and CRP  - [\] Add on blood smear, RPR, mycoplasma  - [ ] Follow up extensive infectious workup to date and vasculitis panel   - Hold on ECHO, pan-scan CT  - Hold on ANCA, C4/C4, Cryo  - Consider I.D. consult if clinically worsens off abx  - May consider dermatology consult for rash and possible biopsy tomorrow if no improvement  Tx  - [\] Antibiotic holiday (12/26-) to assess clinical response      - s/p Vanc/CTX/acyclovir (12/23-26)  - Continue acetaminophen      Normocytic Anemia  Likely in the context of acute illness and dilutional with IVF during admission.      - [\] Will consider Iron/TIBC/B12/Ferritin/Transferrin/MMA/LDH/haptoglobin if no improvement    Diarrhea  Soft/liquid stool. No abdominal pain. C diff neg. Resolving   -Follow up    Back pain:  We will hold methocarbamol for the time being     History of osteogenesis imperfecta  -Follow up OP     Code: Full  FEN: Regular adult diet  Ppx: SCDs, Lovenox  Dispo: Pending improvement of fevers     Matt Grande MD  [facility]  Pager: [phone]  -----------------------------------------------------------------  Physical Exam   /42 (BP Location: Right arm)   Pulse 90   Temp 101.3  F (38.5  C) (Oral)   Resp 18   Ht 1.829 m (6')   Wt 102.1 kg (225 lb)   SpO2 95%   BMI 30.52 kg/m      General Appearance: looks overall decent, interactive and engaged  HEENT: eyes equal and reactive to light  Respiratory: CTAB  Cardiovascular: RRR, no murmurs  GI: abdomen nontender, nondistended, BS+  Lymph/Hematologic: no LAD  Genitourinary: not examined  Skin: diffuse urticarial rash on arms, thighs, now on trunk as well. See photo of thigh 12/27  Musculoskeletal: full strength  intact  Neurologic: CNs grossly intact  Psychiatric: appropriate affect    -----------------------------------------------------------------  Additional Patient History  Review of Systems   The 10 point Review of Systems is negative other than noted in the HPI or here.    Past Medical History    I have reviewed this patient's medical history and updated it with pertinent information if needed.   Past Medical History:   Diagnosis Date     Osteogenesis imperfecta        Past Surgical History   I have reviewed this patient's surgical history and updated it with pertinent information if needed.  Past Surgical History:   Procedure Laterality Date     ORTHOPEDIC SURGERY         Social History   Social History     Tobacco Use     Smoking status: Current Every Day Smoker     Packs/day: 1.00     Types: Cigarettes     Smokeless tobacco: Never Used   Substance Use Topics     Alcohol use: Yes     Comment: occasionally     Drug use: No       Family History   I have reviewed this patient's family history and updated it with pertinent information if needed.   Family History   Problem Relation Age of Onset     Genetic Disorder Father         OI     Diabetes Maternal Grandmother      Hypertension Maternal Grandmother      Cerebrovascular Disease Maternal Grandmother      Diabetes Maternal Aunt      Cancer - colorectal Maternal Grandfather        Prior to Admission Medications     No current facility-administered medications on file prior to encounter.   Current Outpatient Medications on File Prior to Encounter:  Acetaminophen (TYLENOL PO)    amoxicillin (AMOXIL) 500 MG capsule Take 500 mg by mouth 2 times daily   methocarbamol (ROBAXIN) 500 MG tablet Take 2 tablets (1,000 mg) by mouth 3 times daily as needed       Allergies      Allergies   Allergen Reactions     Codeine Nausea and Vomiting     Morphine Nausea and Vomiting       Data: Review in Epic.

## 2018-12-27 NOTE — PROGRESS NOTES
[facility]  Neurology Progress Note  12/26/18    Patient Name: Jose Spears    Interval events:  Still developing fever. CSF entirely normal w 0 WBC.     Plan to transferee to medicine.     Objective:  B/P: 106/55, T: 98.8, P: 98, R: 20    GENERAL: NAD, lying on bed  HEENT: NC/AT. Mucous membranes moist.  CARDIAC: RRR without  Murmur.  RESPIRATORY: Good effort. CTAB. No w/r/r appreciated.  ABDOMINAL: Soft, non tender, non distended. + BS.   EXTREMITIES: Warm, dry.     Neurological examination:  Mental status: Patient is alert, attentive, and oriented x 3. Language is coherent, spontaneous speech and fluent with intact repetition, comprehension, without dysarthria or aphasia. Memory and ability to follow commands were intact. Fund of knowledge normal.   Cranial nerves:     I     II Visual fields intact   III,IV, VI Extraocular movements were full. Pupils were round, 3 mm in diameter and reacted to light. Convergence intact.  No nystagmus   V Facial sensation intact to touch.   VII Facial movements-both spontaneous and to command intact (raising eyebrows, closing eyes, smiling)   VIII Hearing intact to conversation.    IX, X Palate movement with no weakness/ or atrophy, pharyngeal sensation intact.    XI Shrugging shoulders, turning head against resistance both Intact    XII No Tongue weakness or atrophy. Midline tongue position.       Motor exam:  Manual muscle testing revealed the following MRC grade muscle power:    Right   Left     Neck flexion   5       Neck extension:   5       Shoulder abduction:   5   5     Elbow extension:   5   5     Elbow flexion:   5   5     Wrist flexion:   5   5     Wrist extension:   5   5     FDI   5 5   Hip flexion   5 5   Hip extension   5   5     Knee flexion   5   5     Knee extension   5   5     Dorsiflexion   5 5   Foot inversion 5 5   Foot eversion 5 5   Plantar flexion   5 5        Normal Tone. No muscle atrophy. No fasciculation. No clonus. No  Babinski. No Yanira.     Deep tendon reflexes:       Right   Left     Triceps   2 2   Biceps   2  2     Brachioradialis   2 2     Knee jerk   2 2   Ankle jerk   2 2            No chin reflex, no crossed adductors     Complex motor skills: no ataxia or tremor or chorea or athetosis      Coordination: FNF, HST are intact/no dysmetria. ALAINA intact.      Sensory exam: Pin prick/ light touch intact x 4 extremities.      Vibration sensation:     Right-in seconds Left-in seconds    Big toe  + +   MM + +   Knee       Index + +      Positional sense preserved on big toe/index finger.      Gait: Not tested       Imaging  MRI/MRA C spine 12/25  1. No acute infarct or intracranial hemorrhage.  2. No abnormal enhancing lesions intracranially.  3. Cerebral white matter changes out of proportion to the patient's  age. These may reflect an underlying vasculopathy or relate to prior  infectious or inflammatory insult(s).  4. Head MRA demonstrates no aneurysm or stenosis of the major  intracranial arteries.  5. Left parietal bone prominent intraosseous arachnoid granulation.     CXR w L lobar pneumonia.     Pertinent Studies  Monospot neg.   C diff neg   procal 5.7--> 3.69      Vasculitis panel neg     ESR 20   Enteric bacteria and virus neg.   Anti SSA/B pending      HIV neg      RODRIGO neg       Assessment & Plan:  Jose Spears is a 33 year old male with history of osteogenesis imperfecta who presents with subacute onset of generalized weakness, fever, myalgia and maculopapular rash. Received about 5 days course of steroid when he had a rash bc the thoughts of hives. His hemodynamics still fluctuating in term of developing fever and tachycardia. Now on antibiotics/antivrial. CXR with L lobar consolidation. procal 5.7 and downtrending.  unlikely to be AIDP or meningitis/encephalitis picture based on LP and clinical picture and physical examination.       #Concern for meningitis/encephalitis/ AIDP ruled out.   The pt has  fever and tachycardia. Presented initially for concern of AIDP. He has normal reflexes and AIDP it is not of concern. However. I think he got some kind of infectious process going on. Initially we thought possible sources would be lung and CNS. This kind of subacute onset with rash makes viral etiology high on my ddx. Other possible causes would be HIV/cryptoccous vs fungal infection vs Lyme. MRI brain and C spine not of significant concern meanwhile. LP came back with entirely normal basic studies that would not suggest CNS infection. Patient was transferred to medicine team today.   -Discontinue acyclovir  -Appreciate medicine help.      #Diarrhea  Soft/liquid stool. No abdominal pain. C diff neg.   -Follow up     #Back pain:  We will hold methocarbamol for the time being     #History of osteogenesis imperfecta  -Follow up     Code: Full  FEN: Regular adult diet  Ppx: SCDs, Lovenox  Dispo: Transfereed to medicine today.      Patient was seen and discussed with attending doctor, Dr. Sy.     Anil Howard MD  Neurology PGY2  Pager: [phone]      I saw and evaluated the patient on 12/26/2018 and agree with the findings and the plan of care as documented in the resident's note.      Fever, rash, and abnormal CXR.  Reflexes intact.  CSF is w/o signs of infection, inflammation.  No encephalopathy at any point per family.  No further workup recommended from neurological standpoint.  Will plan on transfer to medicine today for suspected pneumonia.          Howard Sy DO   of Neurology

## 2018-12-27 NOTE — PLAN OF CARE
/56 (BP Location: Right arm)   Pulse 81   Temp 97.7  F (36.5  C) (Oral)   Resp 18   Ht 1.829 m (6')   Wt 102.1 kg (225 lb)   SpO2 99%   BMI 30.52 kg/m    Status: Admitted for subacute onset of generalized weakness, fever, myalgia proceeded by hives for 5 days.  VS: VSS on RA ex intermittent High Temp - Max tonight: 98.5 - PRN tylenol and advil given as available per patient request.  Neuro: A/Ox4 Intact, makes needs known  Respiratory: LS CTA  GI: Regular diet, tolerates well. Diarrhea  : Voids spontaneously without difficulty  IV: PIV SL  Skin: Rash throughout BUE and torso  Activity: Up ad Sydni  See flow sheets for further details and assessments.  Plan of Care: continue to monitor, notify MD of significant changes.

## 2018-12-27 NOTE — PLAN OF CARE
Cared for from [id number]: AVSS, max temp 98.8. Neuros intact. Denied pain. Taking PRN tylenol and ibuprofen for fever. IV SL. Vanco discontinued. On a regular diet with good PO intake. Voiding spon. Continues to have loose BM's. Up independently. Family will take pt around hospital via WC. Showered this evening. Continue with POC.

## 2018-12-27 NOTE — PROGRESS NOTES
Brief Care Coordination Note:  Writer met with patient to discuss establishing care with a primary care provider. Patient would like a [facility] and was given resources for the [facility] and [facility]. Patient will choose his own provider and contact them to schedule an establish of care and hospital follow up appointment. No additional RNCC needs anticipated.     Odessa Malin, RNCC, BSN    [facility]  [address] [place] [id number]    [email or web address]    Office: [phone] Pager: [phone]  To contact weekend RNCC, dial [phone] and enter pager number [phone] at prompt. This pager can not be contacted by text page or outside line.

## 2018-12-27 NOTE — PLAN OF CARE
VSS. Generalized aches controlled with tylenol and ibuprofen (MDs aware that bilateral shins are painful). Good po intake. Voiding, having loose stools. Pt has rash on torso and BUE. Up indep in room. Uses WC when longer distances. Mother at bedside during day. Pt is able to make needs known.     1400 pt temp 100.1, requesting tylenol when due at 1600 (ibuprofen given at this time).

## 2018-12-27 NOTE — PROGRESS NOTES
"Internal Medicine Brief Plan Update  [facility], [place]  Date of Admission: December 26, 2018    Brief H&P update 12/26/18:  12/1/18 developed hives, did not report to urgent care until 12/14. At that time given prednisone 20mg for 5 days, during which hives improved. Then 12/19-20 received dental root canal for infected tooth (partially removed). Following this was given amoxicillin (took for 3-4 days), and the nsxt day developed sore throat, fevers, chills, weakness, body aches that continued until admission 12/23. The hives also returned around this time (starting on the inner arms, top of hands, then to thighs and buttocks, but not on trunk; unresponsive to benadryl). Moreover, on 12/23 awoke with new onset extreme weakness, such that he had difficult getting up and ambulating around his house, feeling like he \"had shin splints everywhere.\" He was admitted to neuro service with concern for AIDP, but improved in his initially documented weakness and decreased reflexes to normal strength. A LP was done that was clean, and broad infectious workup has been negative. He has received 4 days of vanc/CTX. He says he is 70% himself now, compared to 5% upon admission. No history of hives or recent travel or sick contacts.    Plan updates:    Discontinuing antibiotics given less concern for infectious process, will restart if clinically worsening, but do expect some continued fever    Obtaining blood smear, RPR, Mycoplasma PCR to add to already broad infectious workup     -----------------------------------------------------------------  Assessment & Plan   Jose Spears is a 33 year old male with history of osteogenesis imperfecta who presents with subacute onset of generalized weakness, fever, myalgia and maculopapular rash. Received about 5 days course of steroid when he had a rash bc the thoughts of hives. His hemodynamics still fluctuating in term of developing fever and tachycardia. " Now off antibiotics/antivrial. CXR with L lobar consolidation, procal 5.7, though  unlikely to be AIDP by neuro with clean LP.     Persistent Fevers, Rash, Acute Onset Weakness  Presented with fever, acute onset weakness and tachycardia to neuro. Presented initially for concern of AIDP, encephalitis or meningitis, now transferred to medicine with clean LP, normal reflexes, normal strength and no further indications of these etiologies while continuing to spike fevers on broad spectrum antibiotics.    Differential includes infection, serum sickness reaction, vasculitis, malignancy, connective tissue disease, genetic etiologies. Infectious etiologies most concerning are viral versus atypical bacterial versus abscess versus dental infection (in setting of recent procedure, but less likely given lack of tooth/jaw pain) versus endocarditis (only 1 minor [facility] criteria). Less likely sources include LLL pneumonia, UTI, wounds (none known), IVDU (none reported), Tic-borne illnesses, fungal infections. Perhaps most likely currently given lack of improvement on 5 days antibiotics may be a serum sickness or other drug reaction (was given amoxicillin after dental procedure immediately preceding symptoms).    Dx  - BCx repeat when spikes  - Trend fever and CRP  - [\] Add on blood smear, RPR, mycoplasma  - [ ] Follow up extensive infectious workup to date and vasculitis panel   - Hold on ECHO, pan-scan CT  - Hold on ANCA, C4/C4, Cryo  - Consider I.D. consult if clinically worsens off abx  Tx  - [\] Antibiotic holiday (12/26-) to assess clinical response      - s/p Vanc/CTX/acyclovir (12/23-26)  - Continue acetaminophen      Normocytic Anemia  Likely in the context of acute illness and dilutional with IVF during admission.      - [\] Will consider Iron/TIBC/B12/Ferritin/Transferrin/MMA/LDH/haptoglobin if no improvement    Diarrhea  Soft/liquid stool. No abdominal pain. C diff neg. Resolving   -Follow up    Back pain:  We will hold  methocarbamol for the time being     History of osteogenesis imperfecta  -Follow up OP     Code: Full  FEN: Regular adult diet  Ppx: SCDs, Lovenox  Dispo: Pending improvement of fevers     Matt Grande MD  [facility]  Pager: [phone]  -----------------------------------------------------------------  Physical Exam   /55 (BP Location: Right arm)   Pulse 98   Temp 98.8  F (37.1  C) (Oral)   Resp 20   Ht 1.829 m (6')   Wt 102.1 kg (225 lb)   SpO2 96%   BMI 30.52 kg/m      General Appearance: looks overall decent, interactive and engaged  HEENT: eyes equal and reactive to light  Respiratory: CTAB  Cardiovascular: RRR, no murmurs  GI: abdomen nontender, nondistended, BS+  Lymph/Hematologic: no LAD  Genitourinary: not examined  Skin: diffuse urticarial rash on arms, thighs  Musculoskeletal: full strength intact  Neurologic: CNs grossly intact  Psychiatric: appropriate affect    -----------------------------------------------------------------  Additional Patient History  Review of Systems   The 10 point Review of Systems is negative other than noted in the HPI or here.    Past Medical History    I have reviewed this patient's medical history and updated it with pertinent information if needed.   Past Medical History:   Diagnosis Date     Osteogenesis imperfecta        Past Surgical History   I have reviewed this patient's surgical history and updated it with pertinent information if needed.  Past Surgical History:   Procedure Laterality Date     ORTHOPEDIC SURGERY         Social History   Social History     Tobacco Use     Smoking status: Current Every Day Smoker     Packs/day: 1.00     Types: Cigarettes     Smokeless tobacco: Never Used   Substance Use Topics     Alcohol use: Yes     Comment: occasionally     Drug use: No       Family History   I have reviewed this patient's family history and updated it with pertinent information if needed.   Family History   Problem Relation Age of  Onset     Genetic Disorder Father         OI     Diabetes Maternal Grandmother      Hypertension Maternal Grandmother      Cerebrovascular Disease Maternal Grandmother      Diabetes Maternal Aunt      Cancer - colorectal Maternal Grandfather        Prior to Admission Medications     No current facility-administered medications on file prior to encounter.   Current Outpatient Medications on File Prior to Encounter:  Acetaminophen (TYLENOL PO)    amoxicillin (AMOXIL) 500 MG capsule Take 500 mg by mouth 2 times daily   methocarbamol (ROBAXIN) 500 MG tablet Take 2 tablets (1,000 mg) by mouth 3 times daily as needed       Allergies      Allergies   Allergen Reactions     Codeine Nausea and Vomiting     Morphine Nausea and Vomiting       Data: Review in Epic.

## 2018-12-28 LAB
ALB CSF/SERPL: 2.7 RATIO (ref 0–9)
ALBUMIN CSF-MCNC: 8 MG/DL (ref 0–35)
ALBUMIN SERPL-MCNC: 2960 MG/DL (ref 3500–5200)
ANION GAP SERPL CALCULATED.3IONS-SCNC: 12 MMOL/L (ref 3–14)
BACTERIA SPEC CULT: NO GROWTH
BACTERIA SPEC CULT: NO GROWTH
BUN SERPL-MCNC: 12 MG/DL (ref 7–30)
CALCIUM SERPL-MCNC: 7.9 MG/DL (ref 8.5–10.1)
CHLORIDE SERPL-SCNC: 103 MMOL/L (ref 94–109)
CO2 SERPL-SCNC: 19 MMOL/L (ref 20–32)
CREAT SERPL-MCNC: 0.64 MG/DL (ref 0.66–1.25)
CRP SERPL-MCNC: 230 MG/L (ref 0–8)
EBV DNA # SPEC NAA+PROBE: <500 {COPIES}/ML
EBV DNA SPEC NAA+PROBE-LOG#: <2.7 {LOG_COPIES}/ML
ERYTHROCYTE [DISTWIDTH] IN BLOOD BY AUTOMATED COUNT: 13.8 % (ref 10–15)
GFR SERPL CREATININE-BSD FRML MDRD: >90 ML/MIN/{1.73_M2}
GLUCOSE SERPL-MCNC: 136 MG/DL (ref 70–99)
HCT VFR BLD AUTO: 36.7 % (ref 40–53)
HGB BLD-MCNC: 12.2 G/DL (ref 13.3–17.7)
IGG CSF-MCNC: <0.9 MG/DL (ref 0–6)
IGG SERPL-MCNC: 593 MG/DL (ref 768–1632)
IGG SYNTH RATE SER+CSF CALC-MRATE: ABNORMAL MG/D
IGG/ALB CLEAR SER+CSF-RTO: ABNORMAL RATIO (ref 0.28–0.66)
IGG/ALB CSF: ABNORMAL RATIO (ref 0.09–0.25)
LAB SCANNED RESULT: NORMAL
Lab: NORMAL
Lab: NORMAL
MCH RBC QN AUTO: 28.2 PG (ref 26.5–33)
MCHC RBC AUTO-ENTMCNC: 33.2 G/DL (ref 31.5–36.5)
MCV RBC AUTO: 85 FL (ref 78–100)
OLIGOCLONAL BANDS CSF ELPH-IMP: ABNORMAL
OLIGOCLONAL BANDS CSF ELPH-IMP: NEGATIVE
OLIGOCLONAL BANDS CSF IEF: 0 BANDS (ref 0–1)
PLATELET # BLD AUTO: 179 10E9/L (ref 150–450)
POTASSIUM SERPL-SCNC: 2.9 MMOL/L (ref 3.4–5.3)
POTASSIUM SERPL-SCNC: 3.6 MMOL/L (ref 3.4–5.3)
PROCALCITONIN SERPL-MCNC: 2.22 NG/ML
RBC # BLD AUTO: 4.32 10E12/L (ref 4.4–5.9)
SODIUM SERPL-SCNC: 134 MMOL/L (ref 133–144)
SPECIMEN SOURCE: NORMAL
SPECIMEN SOURCE: NORMAL
WBC # BLD AUTO: 9.3 10E9/L (ref 4–11)

## 2018-12-28 PROCEDURE — 12000001 ZZH R&B MED SURG/OB UMMC

## 2018-12-28 PROCEDURE — 25000132 ZZH RX MED GY IP 250 OP 250 PS 637: Performed by: STUDENT IN AN ORGANIZED HEALTH CARE EDUCATION/TRAINING PROGRAM

## 2018-12-28 PROCEDURE — 80048 BASIC METABOLIC PNL TOTAL CA: CPT | Performed by: STUDENT IN AN ORGANIZED HEALTH CARE EDUCATION/TRAINING PROGRAM

## 2018-12-28 PROCEDURE — 25000132 ZZH RX MED GY IP 250 OP 250 PS 637: Performed by: PSYCHIATRY & NEUROLOGY

## 2018-12-28 PROCEDURE — 99233 SBSQ HOSP IP/OBS HIGH 50: CPT | Mod: GC | Performed by: INTERNAL MEDICINE

## 2018-12-28 PROCEDURE — 87040 BLOOD CULTURE FOR BACTERIA: CPT | Performed by: INTERNAL MEDICINE

## 2018-12-28 PROCEDURE — 36415 COLL VENOUS BLD VENIPUNCTURE: CPT | Performed by: INTERNAL MEDICINE

## 2018-12-28 PROCEDURE — 84132 ASSAY OF SERUM POTASSIUM: CPT | Performed by: INTERNAL MEDICINE

## 2018-12-28 PROCEDURE — 86140 C-REACTIVE PROTEIN: CPT | Performed by: STUDENT IN AN ORGANIZED HEALTH CARE EDUCATION/TRAINING PROGRAM

## 2018-12-28 PROCEDURE — 36415 COLL VENOUS BLD VENIPUNCTURE: CPT | Performed by: STUDENT IN AN ORGANIZED HEALTH CARE EDUCATION/TRAINING PROGRAM

## 2018-12-28 PROCEDURE — 85027 COMPLETE CBC AUTOMATED: CPT | Performed by: STUDENT IN AN ORGANIZED HEALTH CARE EDUCATION/TRAINING PROGRAM

## 2018-12-28 PROCEDURE — 84145 PROCALCITONIN (PCT): CPT | Performed by: STUDENT IN AN ORGANIZED HEALTH CARE EDUCATION/TRAINING PROGRAM

## 2018-12-28 PROCEDURE — 25000132 ZZH RX MED GY IP 250 OP 250 PS 637: Performed by: INTERNAL MEDICINE

## 2018-12-28 RX ORDER — LOPERAMIDE HCL 2 MG
2 CAPSULE ORAL 4 TIMES DAILY PRN
Status: DISCONTINUED | OUTPATIENT
Start: 2018-12-28 | End: 2018-12-29 | Stop reason: HOSPADM

## 2018-12-28 RX ADMIN — POTASSIUM CHLORIDE 40 MEQ: 750 TABLET, EXTENDED RELEASE ORAL at 11:40

## 2018-12-28 RX ADMIN — ACETAMINOPHEN 650 MG: 325 TABLET, FILM COATED ORAL at 00:46

## 2018-12-28 RX ADMIN — IBUPROFEN 600 MG: 200 TABLET, FILM COATED ORAL at 15:46

## 2018-12-28 RX ADMIN — POTASSIUM CHLORIDE 40 MEQ: 750 TABLET, EXTENDED RELEASE ORAL at 09:32

## 2018-12-28 RX ADMIN — ACETAMINOPHEN 650 MG: 325 TABLET, FILM COATED ORAL at 09:34

## 2018-12-28 RX ADMIN — ACETAMINOPHEN 650 MG: 325 TABLET, FILM COATED ORAL at 20:30

## 2018-12-28 RX ADMIN — IBUPROFEN 600 MG: 200 TABLET, FILM COATED ORAL at 03:24

## 2018-12-28 RX ADMIN — LOPERAMIDE HYDROCHLORIDE 2 MG: 2 CAPSULE ORAL at 17:02

## 2018-12-28 ASSESSMENT — ACTIVITIES OF DAILY LIVING (ADL)
ADLS_ACUITY_SCORE: 12

## 2018-12-28 ASSESSMENT — VISUAL ACUITY: OU: BASELINE;GLASSES

## 2018-12-28 NOTE — PLAN OF CARE
Status: pt admitted for sudden onset of generalized weakness   VS: low grade temps over shift, max temp 99.6, OVSS   Neuros: all intact  GI: regular diet, BS+, passing flatus  : voiding spontaneously w/o difficulty?  IV: PIV SL  Activity: up independently, ambulated unit over night  Pain: pain/discomfort managed w/ PRN Tylenol & ibuprofen   Respiratory/Trach: LS clear, sating in 90s on RA   Skin: rash on BUE, forehead, & trunk. Intermittent sweating over shift.   Social: mother arrived w/in 0600 hour    Plan of care: continue to monitor & follow POC per orders.

## 2018-12-28 NOTE — PROGRESS NOTES
Internal Medicine Brief Plan Update  [facility], [place]  Date of Admission: December 26, 2018    Plan updates 12/28/18:    Febrile last evening again but with less intensity and fever curve somewhat reducing. Good strength now.    CRP increased decreased 300-> 230, white count now normalized 11.8->9.3    Still holding antibiotics given less concern for infectious process, will restart if clinically worsening, but do expect some continued fever    May consider dermatology consult for rash and possible biopsy if no improvement, though has continued to improve thus far    Hypokalemic; replacing with protocol on oral                 -----------------------------------------------------------------  Assessment & Plan   Jose Spears is a 33 year old male with history of osteogenesis imperfecta who presents with subacute onset of generalized weakness, fever, myalgia and maculopapular rash. Received about 5 days course of steroid when he had a rash bc the thoughts of hives. His hemodynamics still fluctuating in term of developing fever and tachycardia. Now off antibiotics/antivrial. CXR with L lobar consolidation, procal 5.7, though  unlikely to be AIDP by neuro with clean LP.     Persistent Fevers, Rash, Acute Onset Weakness  Presented with fever, acute onset weakness and tachycardia to neuro. Presented initially for concern of AIDP, encephalitis or meningitis, now transferred to medicine with clean LP, normal reflexes, normal strength and no further indications of these etiologies while continuing to spike fevers on broad spectrum antibiotics.    Differential includes infection, serum sickness reaction, vasculitis, malignancy, connective tissue disease, genetic etiologies. Infectious etiologies most concerning are viral versus atypical bacterial versus abscess versus dental infection (in setting of recent procedure, but less likely given lack of tooth/jaw pain) versus endocarditis (only  1 minor [facility] criteria). Less likely sources include LLL pneumonia, UTI, wounds (none known), IVDU (none reported), Tic-borne illnesses, fungal infections. Perhaps most likely currently given lack of improvement on 5 days antibiotics may be a serum sickness or other drug reaction (was given amoxicillin after dental procedure immediately preceding symptoms).    Dx  - [\] BCx repeat when spikes  - Trend fever and CRP  - [\] Add on blood smear, RPR, mycoplasma  - [ ] Follow up extensive infectious workup to date and vasculitis panel   - Hold on ECHO, pan-scan CT  - Hold on ANCA, C4/C4, Cryo  - Consider I.D. consult if clinically worsens off abx  - May consider dermatology consult for rash and possible biopsy tomorrow if no improvement  Tx  - [\] Antibiotic holiday (12/26-) to assess clinical response      - s/p Vanc/CTX/acyclovir (12/23-26)  - Continue acetaminophen      Normocytic Anemia  Likely in the context of acute illness and dilutional with IVF during admission.      - [\] Will consider Iron/TIBC/B12/Ferritin/Transferrin/MMA/LDH/haptoglobin if no improvement    Hypokalemia  To 2.9. Replacing with oral K, on protocol.    Diarrhea  Soft/liquid stool. No abdominal pain. C diff neg. Resolving   -Follow up    Back pain:  We will hold methocarbamol for the time being     History of osteogenesis imperfecta  -Follow up OP     Code: Full  FEN: Regular adult diet  Ppx: SCDs, Lovenox  Dispo: Pending improvement of fevers     Matt Grande MD  [facility]  Pager: [phone]  -----------------------------------------------------------------  Physical Exam   /55   Pulse 82   Temp 99.7  F (37.6  C) (Oral)   Resp 16   Ht 1.829 m (6')   Wt 102.1 kg (225 lb)   SpO2 95%   BMI 30.52 kg/m      General Appearance: looks overall decent, interactive and engaged, mom at bedside  HEENT: eyes equal and reactive to light  Respiratory: CTAB  Cardiovascular: RRR, no murmurs  GI: abdomen nontender,  nondistended, BS+  Lymph/Hematologic: no LAD  Genitourinary: not examined  Skin: diffuse urticarial rash on arms, thighs, trunk; see photos  Musculoskeletal: full strength intact  Neurologic: CNs grossly intact  Psychiatric: appropriate affect

## 2018-12-28 NOTE — PLAN OF CARE
Temp: 101.3  F (38.5  C) Temp src: Oral BP: 109/42 Pulse: 90 Heart Rate: 71 Resp: 18 SpO2: 95 % O2 Device: None (Room air)    Status: Pt admitted with generalized weakness  VS: Max temp 101.3    Neuros: intact  GI: Regular diet, good intake. Pt reported having BM today.   : Voiding spont ?  IV: PIV SL  Activity: Up ad pepper  Pain: Pain controlled with PRN Ibuprofen and Tylenol  Respiratory/Trach: LS Clear   Skin: Rash BUE and bilateral trunk. Diaphoretic this shift.    Social: SO and daughter visited part of shift.     Plan of care: Continue to monitor and follow POC

## 2018-12-29 VITALS
RESPIRATION RATE: 16 BRPM | HEART RATE: 78 BPM | DIASTOLIC BLOOD PRESSURE: 75 MMHG | SYSTOLIC BLOOD PRESSURE: 122 MMHG | BODY MASS INDEX: 30.48 KG/M2 | WEIGHT: 225 LBS | OXYGEN SATURATION: 100 % | HEIGHT: 72 IN | TEMPERATURE: 98.5 F

## 2018-12-29 LAB
ANION GAP SERPL CALCULATED.3IONS-SCNC: 6 MMOL/L (ref 3–14)
B BURGDOR IGG CSF QL IB: NEGATIVE
B BURGDOR IGM CSF QL IB: NEGATIVE
BACTERIA SPEC CULT: NO GROWTH
BACTERIA SPEC CULT: NO GROWTH
BUN SERPL-MCNC: 8 MG/DL (ref 7–30)
CALCIUM SERPL-MCNC: 8.1 MG/DL (ref 8.5–10.1)
CHLORIDE SERPL-SCNC: 105 MMOL/L (ref 94–109)
CO2 SERPL-SCNC: 26 MMOL/L (ref 20–32)
CREAT SERPL-MCNC: 0.73 MG/DL (ref 0.66–1.25)
CRP SERPL-MCNC: 190 MG/L (ref 0–8)
ERYTHROCYTE [DISTWIDTH] IN BLOOD BY AUTOMATED COUNT: 14 % (ref 10–15)
FERRITIN SERPL-MCNC: ABNORMAL NG/ML (ref 26–388)
GFR SERPL CREATININE-BSD FRML MDRD: >90 ML/MIN/{1.73_M2}
GLUCOSE SERPL-MCNC: 96 MG/DL (ref 70–99)
HCT VFR BLD AUTO: 38.3 % (ref 40–53)
HGB BLD-MCNC: 12.4 G/DL (ref 13.3–17.7)
JCPYV DNA SERPL QL NAA+PROBE: NOT DETECTED
Lab: NORMAL
Lab: NORMAL
MCH RBC QN AUTO: 27.8 PG (ref 26.5–33)
MCHC RBC AUTO-ENTMCNC: 32.4 G/DL (ref 31.5–36.5)
MCV RBC AUTO: 86 FL (ref 78–100)
PLATELET # BLD AUTO: 218 10E9/L (ref 150–450)
POTASSIUM SERPL-SCNC: 3.9 MMOL/L (ref 3.4–5.3)
PROCALCITONIN SERPL-MCNC: 2.35 NG/ML
RBC # BLD AUTO: 4.46 10E12/L (ref 4.4–5.9)
SODIUM SERPL-SCNC: 136 MMOL/L (ref 133–144)
SPECIMEN SOURCE: NORMAL
WBC # BLD AUTO: 12.2 10E9/L (ref 4–11)

## 2018-12-29 PROCEDURE — 25000132 ZZH RX MED GY IP 250 OP 250 PS 637: Performed by: STUDENT IN AN ORGANIZED HEALTH CARE EDUCATION/TRAINING PROGRAM

## 2018-12-29 PROCEDURE — 25000132 ZZH RX MED GY IP 250 OP 250 PS 637: Performed by: INTERNAL MEDICINE

## 2018-12-29 PROCEDURE — 82728 ASSAY OF FERRITIN: CPT | Performed by: STUDENT IN AN ORGANIZED HEALTH CARE EDUCATION/TRAINING PROGRAM

## 2018-12-29 PROCEDURE — 80048 BASIC METABOLIC PNL TOTAL CA: CPT | Performed by: STUDENT IN AN ORGANIZED HEALTH CARE EDUCATION/TRAINING PROGRAM

## 2018-12-29 PROCEDURE — 99239 HOSP IP/OBS DSCHRG MGMT >30: CPT | Mod: GC | Performed by: INTERNAL MEDICINE

## 2018-12-29 PROCEDURE — 84145 PROCALCITONIN (PCT): CPT | Performed by: STUDENT IN AN ORGANIZED HEALTH CARE EDUCATION/TRAINING PROGRAM

## 2018-12-29 PROCEDURE — 84132 ASSAY OF SERUM POTASSIUM: CPT | Performed by: STUDENT IN AN ORGANIZED HEALTH CARE EDUCATION/TRAINING PROGRAM

## 2018-12-29 PROCEDURE — 36415 COLL VENOUS BLD VENIPUNCTURE: CPT | Performed by: STUDENT IN AN ORGANIZED HEALTH CARE EDUCATION/TRAINING PROGRAM

## 2018-12-29 PROCEDURE — 86140 C-REACTIVE PROTEIN: CPT | Performed by: STUDENT IN AN ORGANIZED HEALTH CARE EDUCATION/TRAINING PROGRAM

## 2018-12-29 PROCEDURE — 85027 COMPLETE CBC AUTOMATED: CPT | Performed by: STUDENT IN AN ORGANIZED HEALTH CARE EDUCATION/TRAINING PROGRAM

## 2018-12-29 RX ORDER — ACETAMINOPHEN 325 MG/1
650 TABLET ORAL EVERY 4 HOURS PRN
COMMUNITY
Start: 2018-12-29 | End: 2019-09-11

## 2018-12-29 RX ORDER — HYDROXYZINE HYDROCHLORIDE 25 MG/1
25 TABLET, FILM COATED ORAL AT BEDTIME
Qty: 10 TABLET | Refills: 0 | Status: SHIPPED | OUTPATIENT
Start: 2018-12-29 | End: 2019-01-02

## 2018-12-29 RX ORDER — LIDOCAINE HYDROCHLORIDE AND EPINEPHRINE 10; 10 MG/ML; UG/ML
3 INJECTION, SOLUTION INFILTRATION; PERINEURAL ONCE
Status: DISCONTINUED | OUTPATIENT
Start: 2018-12-29 | End: 2018-12-29 | Stop reason: HOSPADM

## 2018-12-29 RX ADMIN — LOPERAMIDE HYDROCHLORIDE 2 MG: 2 CAPSULE ORAL at 00:14

## 2018-12-29 RX ADMIN — ACETAMINOPHEN 650 MG: 325 TABLET, FILM COATED ORAL at 05:40

## 2018-12-29 RX ADMIN — IBUPROFEN 600 MG: 200 TABLET, FILM COATED ORAL at 00:14

## 2018-12-29 RX ADMIN — IBUPROFEN 600 MG: 200 TABLET, FILM COATED ORAL at 12:06

## 2018-12-29 RX ADMIN — ACETAMINOPHEN 650 MG: 325 TABLET, FILM COATED ORAL at 17:35

## 2018-12-29 ASSESSMENT — ACTIVITIES OF DAILY LIVING (ADL)
ADLS_ACUITY_SCORE: 12

## 2018-12-29 NOTE — CONSULTS
[facility] Inpatient Consult Dermatology Note    Impression/Plan:  1. Acute urticaria--likely 2/2 infectious process    At this time, we are unsure of the exact cause of the pt's urticaria, but we suspect it may be 2/2 his recent dental infection/root canal, or the pt may have had an underlying viral infection that would also explain his fevers and weakness. At this time, his UA was unremarkable for hematuria, and given that his papules are asymptomatic and do not last more than 24 hours, we have a low suspicion for adult-onset Still's disease. Pt's medication hx is also unremarkable. At this time given that the rash is improving and looks classic for urticaria, we do not believe a skin bx would be particularly helpful  At this time we could recommended schedule cetrizine. Pt can take up to 20mg BID  Please check an ESR, CRP, and ferritin before discharge. We do not believe C3/C4 and SPEP need to be checked at this time.  I have placed a clinic visit for 1/10/19 with Dr. Santana in our dermatology clinic      Thank you for the dermatology consultation. Please do not hesitate to contact the dermatology resident/faculty on call for any additional questions or concerns. We will continue to follow.     Paresh Bear MD  Dermatology Resident, PGY-4  Pager: [phone]      Dermatology Problem List:  1. Urticaria    Date of Admission: Dec 23, 2018   Encounter Date: 12/29/2018     Reason for Consultation:   Rash since early December 2018    History of Present Illness:  Mr. Jose Spears is a 33 year old male with osteogenesis imperfecta who was admitted to the [facility] on 12/23/18 for progressive weakness.    Briefly, in early December (around the second week of the month), he notices new hives on his body. This has not happened before. He states that they hives would come and go but would not last for more than 24 hours. They do not itch, hurt, or burn. He later went to urgent care at [place]  and was Rx'ed prednisone 20mg BID for 5 days. He states this caused the hives to go away. Around  12/17/18, he had to have an emergency root canal, and was subsequently given amoxicillin. Within the next two days, he developed progressive weakness of his body as well as fevers. The pt was subsequently admitted to to [facility] on 12/23/18 by neurology. A cause of his weakness could not be identified, and CSF analysis was unremarkable. As part of his work up, the pt was noted to have a consolidation on his left lung suggestive of PNA, although his primary medicine team was not sure if he actually had PNA.     During his stay, the pt continues to develop hives, but he notes they are getting better. They are asymptomatic and continue to not itch, hurt, or burn, nor do they last more than 24 hours. He denies any hx of dark colored urine, and urinalysis on 12/25/18 was negative for hematuria. A ferritin has not been obtained yet, and the pt denies a hx of malignancy. Currently, the pt is not receiving any topical steroids or oral antihistamines. The pt denies any lip or tongue swelling, difficulty breathing, or recurrent diarrhea. Otherwise, the pt denies any other general or skin-specific complaints at this time.     Past Medical History:   Patient Active Problem List   Diagnosis     Osteogenesis imperfecta     Tobacco abuse     External hemorrhoids     Weakness     Past Medical History:   Diagnosis Date     Osteogenesis imperfecta      Past Surgical History:   Procedure Laterality Date     ORTHOPEDIC SURGERY           Social History:  Patient reports that he has been smoking cigarettes.  He has been smoking about 1.00 pack per day. he has never used smokeless tobacco. He reports that he drinks alcohol. He reports that he does not use drugs.    Family History:  Family History   Problem Relation Age of Onset     Genetic Disorder Father         OI     Diabetes Maternal Grandmother      Hypertension Maternal Grandmother       Cerebrovascular Disease Maternal Grandmother      Diabetes Maternal Aunt      Cancer - colorectal Maternal Grandfather        Medications:  Current Facility-Administered Medications   Medication     acetaminophen (TYLENOL) tablet 650 mg     ibuprofen (ADVIL/MOTRIN) tablet 600 mg     lidocaine (PF) (XYLOCAINE) 1 % injection 5 mL     lidocaine 1% with EPINEPHrine 1:100,000 injection 3 mL     loperamide (IMODIUM) capsule 2 mg     magnesium sulfate 4 g in 100 mL sterile water (premade)     melatonin tablet 1 mg     naloxone (NARCAN) injection 0.1-0.4 mg     ondansetron (ZOFRAN-ODT) ODT tab 4 mg    Or     ondansetron (ZOFRAN) injection 4 mg     polyethylene glycol (MIRALAX/GLYCOLAX) Packet 17 g     potassium chloride (KLOR-CON) Packet 20-40 mEq     potassium chloride 10 mEq in 100 mL intermittent infusion with 10 mg lidocaine     potassium chloride 10 mEq in 100 mL sterile water intermittent infusion (premix)     potassium chloride 20 mEq in 50 mL intermittent infusion     potassium chloride ER (K-DUR/KLOR-CON M) CR tablet 20-40 mEq     senna-docusate (SENOKOT-S/PERICOLACE) 8.6-50 MG per tablet 1 tablet    Or     senna-docusate (SENOKOT-S/PERICOLACE) 8.6-50 MG per tablet 2 tablet          Allergies   Allergen Reactions     Codeine Nausea and Vomiting     Morphine Nausea and Vomiting         Review of Systems:  -As per HPI      Physical exam:  Vitals: /53 (BP Location: Right arm)   Pulse 84   Temp 100.5  F (38.1  C) (Oral)   Resp 16   Ht 1.829 m (6')   Wt 102.1 kg (225 lb)   SpO2 97%   BMI 30.52 kg/m    GEN: This is a well developed, well-nourished male in no acute distress, in a pleasant mood.    SKIN: Focused examination of the face, neck, chest, abdomen, back, right and left arms, hands, fingers, right and left legs was performed.  -Scattered pink, thin papules and plaques on the bilateral dorsal hands and distal forearms. There are similar salmon pink papules on the bilateral lower flanks. On the  bilateral anterior thighs, there are numerous thin salmon pink papules with a white halo. No dark red or purple macules or papules are noted on exam in these areas  -No other lesions of concern on areas examined.     Laboratory:  Results for orders placed or performed during the hospital encounter of 12/23/18 (from the past 24 hour(s))   Potassium   Result Value Ref Range    Potassium 3.6 3.4 - 5.3 mmol/L   Basic metabolic panel   Result Value Ref Range    Sodium 136 133 - 144 mmol/L    Potassium 3.9 3.4 - 5.3 mmol/L    Chloride 105 94 - 109 mmol/L    Carbon Dioxide 26 20 - 32 mmol/L    Anion Gap 6 3 - 14 mmol/L    Glucose 96 70 - 99 mg/dL    Urea Nitrogen 8 7 - 30 mg/dL    Creatinine 0.73 0.66 - 1.25 mg/dL    GFR Estimate >90 >60 mL/min/[1.73_m2]    GFR Estimate If Black >90 >60 mL/min/[1.73_m2]    Calcium 8.1 (L) 8.5 - 10.1 mg/dL   CBC with platelets   Result Value Ref Range    WBC 12.2 (H) 4.0 - 11.0 10e9/L    RBC Count 4.46 4.4 - 5.9 10e12/L    Hemoglobin 12.4 (L) 13.3 - 17.7 g/dL    Hematocrit 38.3 (L) 40.0 - 53.0 %    MCV 86 78 - 100 fl    MCH 27.8 26.5 - 33.0 pg    MCHC 32.4 31.5 - 36.5 g/dL    RDW 14.0 10.0 - 15.0 %    Platelet Count 218 150 - 450 10e9/L   CRP inflammation   Result Value Ref Range    CRP Inflammation 190.0 (H) 0.0 - 8.0 mg/L   Procalcitonin   Result Value Ref Range    Procalcitonin 2.35 ng/ml       Dr. Mckinley staffed the patient.    Staff Involved:  Resident/Staff

## 2018-12-29 NOTE — PLAN OF CARE
Pt on 6A after having progressive, generalized weakness, since resolved, now with persistent fever. VS with Tmax 100.5, lowered to 99.5. Other vitals stable on RA. A&Ox4. Neuros intact. C/o mild headache and took ibuprofen for relief. Regular diet. PIV SL. Voiding spont. BS+. Up independently in room.  Awaiting derm consult with probable discharge after. Continue to monitor and follow POC.

## 2018-12-29 NOTE — DISCHARGE INSTRUCTIONS
You were evaluated for new onset weakness, rash and persistent fever. A broad infectious workup was negative and you were not found to have persistent neurological symptoms. You should continue to use acetaminophen and ibuprofen for fever control. For your rash, you may obtain over the counter zyrtec for the daytime, and may consider atarax at night (prescription available here for pickup if you would like).    Please follow-up with your PCP within 1 week to evaluate basic labs and progression. If worsening weakness or fever or other new concerning symptoms, please return to the emergency room.    It was a pleasure to care for you.

## 2018-12-29 NOTE — PLAN OF CARE
Temp max of 100.4 at 1545, otherwise afebrile rest of day.Headache controlled with tylenol and ibuprofen. Good po intake. Voiding, having loose stools (pt given imodium which helped). Pt has rash on torso and BUE, per MD note will consult Dermatology tomorrow if rash gets worse. Up indep in room.  Mother at bedside during day, SO here this evening. Pt is able to make needs known.

## 2018-12-29 NOTE — PLAN OF CARE
Status: pt admitted for sudden onset of generalized weakness   VS: max temp over shift 100.7. OVSS   Neuros: all intact  GI: regular diet, BS+, passing flatus, had immodium x1 over shift d/t loose stools  : voiding spontaneously w/o difficulty?  IV: PIV SL  Activity: up independently   Pain: pain/discomfort managed w/ PRN Tylenol & ibuprofen   Respiratory/Trach: LS clear, sating in 90s on RA   Skin: rash on BUE & trunk unchanged. Intermittent sweating & chills over shift.   Social: mother called & updated over phone   Plan of care: continue to monitor & follow POC per orders.

## 2018-12-29 NOTE — PLAN OF CARE
Pt on 6A with TBI (SAH and SDH). VSS on RA. A&Ox4, sleeps most of day. Impulsive and forgetful; BA on at all times. Neuros otherwise intact. No complaints of pain. Voiding spont. BS+, BM today; per OT was found in brief; unsure of continence of BM. Up with SBA+GB. [facility] by [facility] at 1300.

## 2018-12-30 ENCOUNTER — TELEPHONE (OUTPATIENT)
Dept: DERMATOLOGY | Facility: CLINIC | Age: 33
End: 2018-12-30

## 2018-12-30 LAB
BACTERIA SPEC CULT: NO GROWTH
Lab: NORMAL
M PNEUMO DNA SPEC QL NAA+PROBE: NOT DETECTED
SPECIMEN SOURCE: NORMAL
SPECIMEN SOURCE: NORMAL

## 2018-12-30 NOTE — TELEPHONE ENCOUNTER
Called pt to discuss ferritin and CRP results which suggest the possibility of adult onset Still's disease, however, the pt did not answer. I will place a request for the pt to be seen by Dr. Mckinley on 1/8/19. I will try and reach the pt tomorrow to discuss lab results and starting a course of oral prednisone.

## 2018-12-30 NOTE — PLAN OF CARE
/75 (BP Location: Right arm)   Pulse 78   Temp 98.5  F (36.9  C) (Axillary)   Resp 16   Ht 1.829 m (6')   Wt 102.1 kg (225 lb)   SpO2 100%   BMI 30.52 kg/m      DISCHARGE:  [id number]:  A&Ox4. Neuros intact. Intermittently febrile, chills, diaphoretic. Tylenol given prior to discharge. AVS reviewed with pt and significant other including medications, s/s to call about, and when to return to clinic. Pt to start taking Atarax per Dermatology at bedtime. Atarax picked up in discharge pharmacy prior to discharge. May take Zyrtec, over the counter, during the day. To follow up with primary care provider with 7 days and will have follow-up labs. L arm PIV removed. Activity as tolerated. Regular diet. MD wrote note for work for pt. Discharged via wheelchair to front door at 1815. Significant other provided ride home to [place], [place].

## 2018-12-31 ENCOUNTER — HOSPITAL ENCOUNTER (EMERGENCY)
Facility: CLINIC | Age: 33
Discharge: HOME | End: 2018-12-31
Attending: EMERGENCY MEDICINE | Admitting: EMERGENCY MEDICINE
Payer: COMMERCIAL

## 2018-12-31 ENCOUNTER — TELEPHONE (OUTPATIENT)
Dept: DERMATOLOGY | Facility: CLINIC | Age: 33
End: 2018-12-31

## 2018-12-31 VITALS
OXYGEN SATURATION: 98 % | WEIGHT: 225 LBS | SYSTOLIC BLOOD PRESSURE: 108 MMHG | BODY MASS INDEX: 30.48 KG/M2 | RESPIRATION RATE: 16 BRPM | TEMPERATURE: 99.1 F | HEART RATE: 84 BPM | HEIGHT: 72 IN | DIASTOLIC BLOOD PRESSURE: 58 MMHG

## 2018-12-31 DIAGNOSIS — J02.0 ACUTE STREPTOCOCCAL PHARYNGITIS: ICD-10-CM

## 2018-12-31 LAB
ALBUMIN SERPL-MCNC: 2.3 G/DL (ref 3.4–5)
ALP SERPL-CCNC: 107 U/L (ref 40–150)
ALT SERPL W P-5'-P-CCNC: 103 U/L (ref 0–70)
ANION GAP SERPL CALCULATED.3IONS-SCNC: 9 MMOL/L (ref 3–14)
AST SERPL W P-5'-P-CCNC: 77 U/L (ref 0–45)
BASOPHILS # BLD AUTO: 0 10E9/L (ref 0–0.2)
BASOPHILS NFR BLD AUTO: 0.1 %
BILIRUB SERPL-MCNC: 0.7 MG/DL (ref 0.2–1.3)
BUN SERPL-MCNC: 13 MG/DL (ref 7–30)
CALCIUM SERPL-MCNC: 8.1 MG/DL (ref 8.5–10.1)
CHLORIDE SERPL-SCNC: 100 MMOL/L (ref 94–109)
CO2 SERPL-SCNC: 23 MMOL/L (ref 20–32)
CREAT SERPL-MCNC: 0.75 MG/DL (ref 0.66–1.25)
CRP SERPL-MCNC: 156 MG/L (ref 0–8)
DEPRECATED S PYO AG THROAT QL EIA: ABNORMAL
DIFFERENTIAL METHOD BLD: ABNORMAL
EOSINOPHIL # BLD AUTO: 0.2 10E9/L (ref 0–0.7)
EOSINOPHIL NFR BLD AUTO: 1.1 %
ERYTHROCYTE [DISTWIDTH] IN BLOOD BY AUTOMATED COUNT: 13.8 % (ref 10–15)
GFR SERPL CREATININE-BSD FRML MDRD: >90 ML/MIN/{1.73_M2}
GLUCOSE SERPL-MCNC: 92 MG/DL (ref 70–99)
HCT VFR BLD AUTO: 37.1 % (ref 40–53)
HETEROPH AB SER QL: NEGATIVE
HGB BLD-MCNC: 12.6 G/DL (ref 13.3–17.7)
IMM GRANULOCYTES # BLD: 0.1 10E9/L (ref 0–0.4)
IMM GRANULOCYTES NFR BLD: 0.7 %
LAB SCANNED RESULT: NORMAL
LYMPHOCYTES # BLD AUTO: 1.3 10E9/L (ref 0.8–5.3)
LYMPHOCYTES NFR BLD AUTO: 7.5 %
MCH RBC QN AUTO: 27.8 PG (ref 26.5–33)
MCHC RBC AUTO-ENTMCNC: 34 G/DL (ref 31.5–36.5)
MCV RBC AUTO: 82 FL (ref 78–100)
MONOCYTES # BLD AUTO: 0.8 10E9/L (ref 0–1.3)
MONOCYTES NFR BLD AUTO: 4.6 %
NEUTROPHILS # BLD AUTO: 14.7 10E9/L (ref 1.6–8.3)
NEUTROPHILS NFR BLD AUTO: 86 %
NRBC # BLD AUTO: 0 10*3/UL
NRBC BLD AUTO-RTO: 0 /100
PLATELET # BLD AUTO: 253 10E9/L (ref 150–450)
POTASSIUM SERPL-SCNC: 3.6 MMOL/L (ref 3.4–5.3)
PROT SERPL-MCNC: 6 G/DL (ref 6.8–8.8)
RBC # BLD AUTO: 4.54 10E12/L (ref 4.4–5.9)
SODIUM SERPL-SCNC: 132 MMOL/L (ref 133–144)
SPECIMEN SOURCE: ABNORMAL
SPECIMEN SOURCE: NORMAL
WBC # BLD AUTO: 17.1 10E9/L (ref 4–11)
WNV RNA SER QL NAA+PROBE: NOT DETECTED

## 2018-12-31 PROCEDURE — 86140 C-REACTIVE PROTEIN: CPT | Performed by: EMERGENCY MEDICINE

## 2018-12-31 PROCEDURE — 99283 EMERGENCY DEPT VISIT LOW MDM: CPT

## 2018-12-31 PROCEDURE — 80053 COMPREHEN METABOLIC PANEL: CPT | Performed by: EMERGENCY MEDICINE

## 2018-12-31 PROCEDURE — 85025 COMPLETE CBC W/AUTO DIFF WBC: CPT | Performed by: EMERGENCY MEDICINE

## 2018-12-31 PROCEDURE — 86308 HETEROPHILE ANTIBODY SCREEN: CPT | Performed by: EMERGENCY MEDICINE

## 2018-12-31 PROCEDURE — 87880 STREP A ASSAY W/OPTIC: CPT | Performed by: EMERGENCY MEDICINE

## 2018-12-31 RX ORDER — AZITHROMYCIN 500 MG/1
500 TABLET, FILM COATED ORAL DAILY
Qty: 5 TABLET | Refills: 0 | Status: ON HOLD | OUTPATIENT
Start: 2018-12-31 | End: 2019-01-04

## 2018-12-31 ASSESSMENT — ENCOUNTER SYMPTOMS
COUGH: 0
SORE THROAT: 1
FATIGUE: 1
CONSTIPATION: 0
DIARRHEA: 0
RHINORRHEA: 0
VOMITING: 0
ABDOMINAL PAIN: 0
WEAKNESS: 1
FEVER: 1
NAUSEA: 0
CHILLS: 1

## 2018-12-31 ASSESSMENT — MIFFLIN-ST. JEOR: SCORE: 2008.59

## 2018-12-31 NOTE — ED PROVIDER NOTES
"  History     Chief Complaint:  Fever    HPI   Jose Spears is a 33 year old male who presents with a fever. Patient reports in early December he got hives. He has these for about 1.5 weeks before he went into urgent care on December 14th. He was started on steroids for 5 days and the hives cleared up. Patient states that on December 18th he had a root canal and started on amoxicillin. He states that on December 19th he was fatigued and weak and he slept all day. On December 20th he worked a half day but then slept the rest of the day and he had generalized body aches and weakness. He threw out his back on December 21st and was seen at [facility] and started on muscle relaxer's. He went to the [facility] on December 22 for chills, fever, sore throat, and rash. He states he had temperature up to 103-104. He had multiple test at the [facility] including a lumbar puncture and mono test without and diagnoses. Patient was discharged two days ago from the [facility] and he states since then he has had \"out of body\" feelings. He reports a slight headache. The patient denies any cough, runny nose, ear pain, abdominal pain, urinary or bowel abnormalities, nausea, and vomiting. The patient did not have an influenza shot.    Allergies:  Codeine  Morphine     Medications:    Acetaminophen (tylenol) 325 mg tablet  Hydroxyzine (atarax) 25 mg tablet  Methocarbamol (robaxin) 500 mg tablet     Past Medical History:    Osteogenesis imperfecta  Weakness  Tobacco abuse  External hemorrhoids    Past Surgical History:    Orthopedic surgery    Family History:    Genetic disorder  Diabetes    Social History:  Patient is single  Tobacco Use: Current 1.00ppd smoker  Alcohol Use: Yes  PCP: Physician No Ref-Primary     Review of Systems   Constitutional: Positive for chills, fatigue and fever.   HENT: Positive for sore throat. Negative for ear pain and rhinorrhea.    Respiratory: Negative for cough.    Gastrointestinal: Negative for " abdominal pain, constipation, diarrhea, nausea and vomiting.   Skin: Positive for rash.   Neurological: Positive for weakness.   All other systems reviewed and are negative.    Physical Exam   First Vitals:  Patient Vitals for the past 24 hrs:   BP Temp Temp src Pulse Resp SpO2 Height Weight   12/31/18 1315 -- -- -- 84 16 98 % -- --   12/31/18 1314 -- 99.1  F (37.3  C) Oral -- -- -- -- --   12/31/18 1108 108/58 98.5  F (36.9  C) Oral 86 15 95 % 1.829 m (6') 102.1 kg (225 lb)     Physical Exam  SKIN:  Warm, dry.  No jaundice.  Generalized macular erythematous blanching rash.  No cutaneous blistering or sloughing.  HEMATOLOGIC/IMMUNOLOGIC/LYMPHATIC:  No pallor.  No limb edema.  No petechiae or purpura.  HENT:  Moist oral mucosa.  Normal phonation.  No stridor.  Erythematous posterior pharynx.  Pallavi tonsils.    EYES:  Conjunctivae normal.  Anicteric.  CARDIOVASCULAR:  Regular rate and rhythm.  No murmur.  No rub.  RESPIRATORY:  No respiratory distress, breath sounds equal and normal.  GASTROINTESTINAL:  Soft, nontender abdomen.  No hepatosplenomegaly.  MUSCULOSKELETAL:  Full painless active ROM of head/neck torso and limbs.  No large limb joint inflammation.  NEUROLOGIC:  Alert, conversant.  PSYCHIATRIC:  Normal mood.    Emergency Department Course     Laboratory:  CBC:  WBC 17.1 high, HGB 12.6 low,   CMP: Calcium 8.1 low, Albumin 2.3 low, Protein Total 6.0 low, AST 77 high,  high, o/w WNL.   CRP: 156.0 high  Rapid Strep: Positive  Mononucleosis Screen: Negative    Emergency Department Course:  12:01 PM Nursing notes and vitals reviewed.  I performed an exam of the patient as documented above.   12:50 PM Rechecked on and updated the patient  1:10 PM Findings and plan explained to the patient. Patient discharged home with instructions regarding supportive care, medications, and reasons to return. The importance of close follow-up was reviewed.     Impression & Plan      Medical Decision Making:  This  patient presents with an ongoing febrile illness and underwent an extensive workup at [facility] recently, discharged 2 days ago. Here his strep test was positive. Other testing unrevealing barring the leukocytosis. Clinically well appearing.  Doubt sepsis or other pathology to explain his illness. I opted for azithromycin for strep as he took amoxicillin and did not improve while taking this prior to his hospitalization. Advised recheck if needed.     Diagnosis:    ICD-10-CM    1. Acute streptococcal pharyngitis J02.0        Disposition:  discharged to home    Discharge Medications:     Medication List      Started    azithromycin 500 MG tablet  Commonly known as:  ZITHROMAX  500 mg, Oral, DAILY          I, Bradley Aasen, am serving as a scribe on 12/31/2018 at 11:58 AM to personally document services performed by Joseph Martinez MD based on my observations and the provider's statements to me.          Joseph Martinez MD  01/01/19 0911

## 2018-12-31 NOTE — ED NOTES
Pt recently discharged from [facility] for fever, spiked fever again last night and cont with weakness and malaise.

## 2018-12-31 NOTE — ED AVS SNAPSHOT
Emergency Department  [address] [place] [address]  Phone:  [phone]  Fax:  [phone]                                    Jose Spears   MRN: [id number]    Department:   Emergency Department   Date of Visit:  12/31/2018           After Visit Summary Signature Page    I have received my discharge instructions, and my questions have been answered. I have discussed any challenges I see with this plan with the nurse or doctor.    ..........................................................................................................................................  Patient/Patient Representative Signature      ..........................................................................................................................................  Patient Representative Print Name and Relationship to Patient    ..................................................               ................................................  Date                                   Time    ..........................................................................................................................................  Reviewed by Signature/Title    ...................................................              ..............................................  Date                                               Time          22EPIC Rev 08/18

## 2018-12-31 NOTE — TELEPHONE ENCOUNTER
I called the pt again, but he did not answer. At this time, I will hold off on setting up a rheumatology referral and sending an Rx for prednisone until I can speak to the pt.

## 2018-12-31 NOTE — DISCHARGE SUMMARY
General acute hospital, [place]  Discharge Summary - Medicine & Pediatrics       Date of Admission:  12/23/2018  Date of Discharge:  12/29/2018  6:01 PM  Discharging Provider: Dr. Mary Lancaster   Discharge Service: Nir 3    Discharge Diagnoses   Fever, suspected secondary to Adult-onset Stills   Acute urticaria  Normocytic anemia  Diarrhea  Osteogenesis Imperfecta   Multiple dental abscesses, secondary to OI       Follow-ups Needed After Discharge   Follow-up Appointments     Adult [facility] Follow-up and recommended labs and tests      Follow up with primary care provider, within 7 days for hospital follow- up.  The following labs/tests are recommended: CBC, repeat ferritin and anemia workup, BMP. Please assess for change in fever and rash, concern for Adult-onset Stills disease.      Appointments on [place] and/or [facility] (with [facility] or [facility] provider or service). Call [phone] if you haven't heard regarding these appointments within 7 days of discharge.               Unresulted Labs Ordered in the Past 30 Days of this Admission     Date and Time Order Name Status Description    12/25/2018 1127 Cysticercosis antibody IgG CSF: In process     12/25/2018 1127 Fungus Culture, non-blood Tube 2 Preliminary     12/23/2018 1554 Anaerobic CSF culture Preliminary       These results will be followed up by PCP    Hospital Course   Jose Spears is a 33 year old male with history of osteogenesis imperfecta admitted on 12/23/2018 with subacute onset of generalized weakness, fever, myalgia and maculopapular rash. Please see H&P from 12/23/18 for further details.      Fever, suspected secondary to Adult-onset Stills   Acute urticarial rash   Mild normocytic anemia   Weakness  Diarrhea    Mr. Spears was initially admitted to the neurology service for concern of meningitis when he presented, and he was started on acyclovir, ceftriaxone and vancomycin for empiric coverage. However, his LP  was negative (clear tap) and expansive infections and autoimmune studies on the CSF were negative. He continued to be febrile despite this empiric treatment, and was transferred to Internal Medicine for further work-up. Due to concern for non-bacterial infectious etiology (differential including serum sickness (secondary to amoxicillin?), drug reaction, rheumatologic disease (Adult-onset Stills specifically, as other autoimmune studies returned negative) or viral infection) antibiotics were stopped. Overall a vasculitic or malignancy-related process were felt less likely. While his fever curve did initially improve off antibiotics, and his other non-specific symptoms of myalgias, weakness, etc improved as well, he did not completely defervesce and his rash persisted. He was evaluated by dermatology on the day of discharge, who confirmed that his rash appeared to be simple urticaria and recommended supportive treatments and completing work-up for Adult-Onset Stills. He discharged home with these labs pending, cetirizine and hydroxyzine for symptomatic treatment of hives, and close PCP/Dermatology follow-up to ensure improvement in rash.     After discharge, it was noted that his ferritin returned >25,000, suspicious for Adult-onset Stills disease - both attempts by Dr. Lancaster and Dermatology to reach the patient ended in leaving voicemails. Patient should be re-evaluated ASAP, strong consideration given to starting steroids, and rheumatology consult obtained.     Osteogenesis Imperfecta   Multiple dental abscesses, secondary to OI   Patient with a history of dental issues related to his known OI, did have an abscess recently requiring amoxicillin that will need ongoing management. Discussed possibility of drug reaction/serum sickness related to repeated amoxicillin exposures in this context, and counseled on possible options - either a different antibiotic, controlled exposure in medical context, or watching closely  for symptoms if he chose to re-challenge with amoxicillin.       Consultations This Hospital Stay   PHARMACY TO DOSE VANCO  PHARMACY TO DOSE VANCO  VASCULAR ACCESS CARE ADULT IP CONSULT  VASCULAR ACCESS CARE ADULT IP CONSULT  VASCULAR ACCESS CARE ADULT IP CONSULT  DERMATOLOGY IP CONSULT    Code Status   Full Code       The patient was discussed with MD Nir Franco 3 Service  [facility], [place]  Pager: [phone]  ______________________________________________________________________    Physical Exam   Vital Signs:                    Weight: 225 lbs 0 oz    GEN: This is a well developed, well-nourished male in no acute distress, in a pleasant mood.    SKIN: Focused examination of the face, neck, chest, abdomen, back, right and left arms, hands, fingers, right and left legs was performed.  -Scattered pink, thin papules and plaques on the bilateral dorsal hands and distal forearms. There are similar pink papules on the bilateral lower flanks. On the bilateral anterior thighs, there are numerous thin salmon pink papules with a white halo. No dark red or purple macules or papules are noted on exam, no Koebner phenomenon, no mucous membrane involvement.   -No other lesions of concern on areas examined.   Resp: CTAB  CV: RRR  Neuro: Alert, oriented, no facial droop, intact speech.       Primary Care Physician   Physician No Ref-Primary    Discharge Disposition   Discharged to home  Condition at discharge: Stable    Significant Results and Procedures   Most Recent ESR & CRP:  Recent Labs   Lab Test 12/29/18  0700  12/24/18  2215   SED  --   --  20*   .0*   < >  --     < > = values in this interval not displayed.       Discharge Orders      Reason for your hospital stay    Weakness, fever and rash.     Adult [facility] Follow-up and recommended labs and tests    Follow up with primary care provider, Physician No Ref-Primary, within 7 days for hospital follow- up.   The following labs/tests are recommended: CBC, ferritin and anemia workup, BMP. Please assess for change in fever and rash.      Appointments on [place] and/or [facility] (with [facility] or [facility] provider or service). Call [phone] if you haven't heard regarding these appointments within 7 days of discharge.     Activity    Your activity upon discharge: as tolerated     Diet    Follow this diet upon discharge: regular     Discharge Medications   Discharge Medication List as of 12/29/2018  4:14 PM      START taking these medications    Details   hydrOXYzine (ATARAX) 25 MG tablet Take 1 tablet (25 mg) by mouth At Bedtime for 10 days, Disp-10 tablet, R-0, Local Print         CONTINUE these medications which have CHANGED    Details   acetaminophen (TYLENOL) 325 MG tablet Take 2 tablets (650 mg) by mouth every 4 hours as needed for mild pain, Historical         CONTINUE these medications which have NOT CHANGED    Details   methocarbamol (ROBAXIN) 500 MG tablet Take 2 tablets (1,000 mg) by mouth 3 times daily as needed, Disp-30 tablet, R-0, Local Print         STOP taking these medications       amoxicillin (AMOXIL) 500 MG capsule Comments:   Reason for Stopping:             Allergies   Allergies   Allergen Reactions     Codeine Nausea and Vomiting     Morphine Nausea and Vomiting

## 2019-01-01 LAB
B BURGDOR DNA SPEC QL NAA+PROBE: NOT DETECTED
PV1 NAB TITR SER NT: NORMAL {TITER}
PV3 NAB TITR SER NT: NORMAL {TITER}
RESULT: NORMAL
SEND OUTS MISC TEST CODE: NORMAL
SEND OUTS MISC TEST SPECIMEN: NORMAL
SPECIMEN SOURCE: NORMAL
TEST NAME: NORMAL

## 2019-01-02 ENCOUNTER — HOSPITAL ENCOUNTER (INPATIENT)
Facility: CLINIC | Age: 34
LOS: 2 days | Discharge: HOME | DRG: 546 | End: 2019-01-04
Attending: EMERGENCY MEDICINE | Admitting: HOSPITALIST
Payer: COMMERCIAL

## 2019-01-02 ENCOUNTER — TELEPHONE (OUTPATIENT)
Dept: INTERNAL MEDICINE | Facility: CLINIC | Age: 34
End: 2019-01-02

## 2019-01-02 DIAGNOSIS — M08.20 STILL'S DISEASE (H): Primary | ICD-10-CM

## 2019-01-02 DIAGNOSIS — R50.9 FEVER AND CHILLS: ICD-10-CM

## 2019-01-02 DIAGNOSIS — R53.1 WEAKNESS: ICD-10-CM

## 2019-01-02 LAB
ALBUMIN SERPL-MCNC: 2.2 G/DL (ref 3.4–5)
ALP SERPL-CCNC: 153 U/L (ref 40–150)
ALT SERPL W P-5'-P-CCNC: 87 U/L (ref 0–70)
ANION GAP SERPL CALCULATED.3IONS-SCNC: 10 MMOL/L (ref 3–14)
AST SERPL W P-5'-P-CCNC: 86 U/L (ref 0–45)
BASOPHILS # BLD AUTO: 0 10E9/L (ref 0–0.2)
BASOPHILS NFR BLD AUTO: 0 %
BILIRUB SERPL-MCNC: 0.6 MG/DL (ref 0.2–1.3)
BUN SERPL-MCNC: 12 MG/DL (ref 7–30)
BURR CELLS BLD QL SMEAR: SLIGHT
CALCIUM SERPL-MCNC: 8.1 MG/DL (ref 8.5–10.1)
CHLORIDE SERPL-SCNC: 98 MMOL/L (ref 94–109)
CK SERPL-CCNC: 287 U/L (ref 30–300)
CO2 SERPL-SCNC: 24 MMOL/L (ref 20–32)
CREAT SERPL-MCNC: 0.68 MG/DL (ref 0.66–1.25)
CRP SERPL-MCNC: 210 MG/L (ref 0–8)
DIFFERENTIAL METHOD BLD: ABNORMAL
EOSINOPHIL # BLD AUTO: 0.2 10E9/L (ref 0–0.7)
EOSINOPHIL NFR BLD AUTO: 0.9 %
ERYTHROCYTE [DISTWIDTH] IN BLOOD BY AUTOMATED COUNT: 14 % (ref 10–15)
ERYTHROCYTE [SEDIMENTATION RATE] IN BLOOD BY WESTERGREN METHOD: 18 MM/H (ref 0–15)
FERRITIN SERPL-MCNC: ABNORMAL NG/ML (ref 26–388)
GFR SERPL CREATININE-BSD FRML MDRD: >90 ML/MIN/{1.73_M2}
GLUCOSE SERPL-MCNC: 77 MG/DL (ref 70–99)
HCT VFR BLD AUTO: 40.4 % (ref 40–53)
HGB BLD-MCNC: 13.1 G/DL (ref 13.3–17.7)
LACTATE BLD-SCNC: 2.5 MMOL/L (ref 0.7–2)
LYMPHOCYTES # BLD AUTO: 1.8 10E9/L (ref 0.8–5.3)
LYMPHOCYTES NFR BLD AUTO: 8.7 %
MCH RBC QN AUTO: 28.1 PG (ref 26.5–33)
MCHC RBC AUTO-ENTMCNC: 32.4 G/DL (ref 31.5–36.5)
MCV RBC AUTO: 87 FL (ref 78–100)
MONOCYTES # BLD AUTO: 0.4 10E9/L (ref 0–1.3)
MONOCYTES NFR BLD AUTO: 1.7 %
NEUTROPHILS # BLD AUTO: 18.7 10E9/L (ref 1.6–8.3)
NEUTROPHILS NFR BLD AUTO: 88.7 %
PLATELET # BLD AUTO: 320 10E9/L (ref 150–450)
PLATELET # BLD EST: ABNORMAL 10*3/UL
POIKILOCYTOSIS BLD QL SMEAR: SLIGHT
POTASSIUM SERPL-SCNC: 3.9 MMOL/L (ref 3.4–5.3)
PROT SERPL-MCNC: 6.4 G/DL (ref 6.8–8.8)
RBC # BLD AUTO: 4.67 10E12/L (ref 4.4–5.9)
SODIUM SERPL-SCNC: 132 MMOL/L (ref 133–144)
WBC # BLD AUTO: 21.1 10E9/L (ref 4–11)

## 2019-01-02 PROCEDURE — 83520 IMMUNOASSAY QUANT NOS NONAB: CPT | Performed by: EMERGENCY MEDICINE

## 2019-01-02 PROCEDURE — 86038 ANTINUCLEAR ANTIBODIES: CPT | Performed by: EMERGENCY MEDICINE

## 2019-01-02 PROCEDURE — 12000001 ZZH R&B MED SURG/OB UMMC

## 2019-01-02 PROCEDURE — 96360 HYDRATION IV INFUSION INIT: CPT | Performed by: EMERGENCY MEDICINE

## 2019-01-02 PROCEDURE — 25000131 ZZH RX MED GY IP 250 OP 636 PS 637: Performed by: STUDENT IN AN ORGANIZED HEALTH CARE EDUCATION/TRAINING PROGRAM

## 2019-01-02 PROCEDURE — 99285 EMERGENCY DEPT VISIT HI MDM: CPT | Mod: 25 | Performed by: EMERGENCY MEDICINE

## 2019-01-02 PROCEDURE — 80053 COMPREHEN METABOLIC PANEL: CPT | Performed by: EMERGENCY MEDICINE

## 2019-01-02 PROCEDURE — 83605 ASSAY OF LACTIC ACID: CPT | Performed by: EMERGENCY MEDICINE

## 2019-01-02 PROCEDURE — 87040 BLOOD CULTURE FOR BACTERIA: CPT | Performed by: EMERGENCY MEDICINE

## 2019-01-02 PROCEDURE — 86140 C-REACTIVE PROTEIN: CPT | Performed by: EMERGENCY MEDICINE

## 2019-01-02 PROCEDURE — 85652 RBC SED RATE AUTOMATED: CPT | Performed by: EMERGENCY MEDICINE

## 2019-01-02 PROCEDURE — 99223 1ST HOSP IP/OBS HIGH 75: CPT | Mod: AI | Performed by: HOSPITALIST

## 2019-01-02 PROCEDURE — 86200 CCP ANTIBODY: CPT | Performed by: EMERGENCY MEDICINE

## 2019-01-02 PROCEDURE — 82728 ASSAY OF FERRITIN: CPT | Performed by: EMERGENCY MEDICINE

## 2019-01-02 PROCEDURE — 25000132 ZZH RX MED GY IP 250 OP 250 PS 637: Performed by: EMERGENCY MEDICINE

## 2019-01-02 PROCEDURE — 82550 ASSAY OF CK (CPK): CPT | Performed by: EMERGENCY MEDICINE

## 2019-01-02 PROCEDURE — 99284 EMERGENCY DEPT VISIT MOD MDM: CPT | Mod: Z6 | Performed by: EMERGENCY MEDICINE

## 2019-01-02 PROCEDURE — 85025 COMPLETE CBC W/AUTO DIFF WBC: CPT | Performed by: EMERGENCY MEDICINE

## 2019-01-02 PROCEDURE — 25000128 H RX IP 250 OP 636: Performed by: EMERGENCY MEDICINE

## 2019-01-02 PROCEDURE — 25000125 ZZHC RX 250: Performed by: STUDENT IN AN ORGANIZED HEALTH CARE EDUCATION/TRAINING PROGRAM

## 2019-01-02 PROCEDURE — 86431 RHEUMATOID FACTOR QUANT: CPT | Performed by: EMERGENCY MEDICINE

## 2019-01-02 RX ORDER — ACETAMINOPHEN 325 MG/1
650 TABLET ORAL EVERY 4 HOURS PRN
Status: DISCONTINUED | OUTPATIENT
Start: 2019-01-02 | End: 2019-01-04 | Stop reason: HOSPADM

## 2019-01-02 RX ORDER — SODIUM CHLORIDE 9 MG/ML
1000 INJECTION, SOLUTION INTRAVENOUS CONTINUOUS
Status: DISCONTINUED | OUTPATIENT
Start: 2019-01-02 | End: 2019-01-03

## 2019-01-02 RX ORDER — MEPERIDINE HYDROCHLORIDE 50 MG/ML
12.5 INJECTION INTRAMUSCULAR; INTRAVENOUS; SUBCUTANEOUS
Status: DISCONTINUED | OUTPATIENT
Start: 2019-01-02 | End: 2019-01-03

## 2019-01-02 RX ORDER — NALOXONE HYDROCHLORIDE 0.4 MG/ML
.1-.4 INJECTION, SOLUTION INTRAMUSCULAR; INTRAVENOUS; SUBCUTANEOUS
Status: DISCONTINUED | OUTPATIENT
Start: 2019-01-02 | End: 2019-01-02

## 2019-01-02 RX ORDER — ACETAMINOPHEN 325 MG/1
650 TABLET ORAL EVERY 4 HOURS PRN
Status: DISCONTINUED | OUTPATIENT
Start: 2019-01-02 | End: 2019-01-02

## 2019-01-02 RX ORDER — NALOXONE HYDROCHLORIDE 0.4 MG/ML
.1-.4 INJECTION, SOLUTION INTRAMUSCULAR; INTRAVENOUS; SUBCUTANEOUS
Status: DISCONTINUED | OUTPATIENT
Start: 2019-01-02 | End: 2019-01-04 | Stop reason: HOSPADM

## 2019-01-02 RX ORDER — ACETAMINOPHEN 325 MG/1
650 TABLET ORAL ONCE
Status: COMPLETED | OUTPATIENT
Start: 2019-01-02 | End: 2019-01-02

## 2019-01-02 RX ADMIN — SODIUM CHLORIDE 1000 ML: 9 INJECTION, SOLUTION INTRAVENOUS at 23:57

## 2019-01-02 RX ADMIN — PREDNISONE 60 MG: 50 TABLET ORAL at 23:16

## 2019-01-02 RX ADMIN — ACETAMINOPHEN 650 MG: 325 TABLET, FILM COATED ORAL at 20:12

## 2019-01-02 RX ADMIN — SODIUM CHLORIDE 1000 ML: 9 INJECTION, SOLUTION INTRAVENOUS at 21:07

## 2019-01-02 ASSESSMENT — MIFFLIN-ST. JEOR
SCORE: 2008.59
SCORE: 1985.46

## 2019-01-02 ASSESSMENT — ENCOUNTER SYMPTOMS
HEADACHES: 1
FEVER: 1
SHORTNESS OF BREATH: 0
ABDOMINAL PAIN: 0

## 2019-01-02 NOTE — LETTER
01/03/19      Jose Spears  [address] [address] [address]        To Whom It May Concern:    Jose Spears is hospitalized at [facility] beginning 1/2/19 (and was previously hospitalized 12/23 - 12/29).  Please excuse him from work while he is in the hospital.         Sincerely,        Matt Grande MD  Internal Medicine

## 2019-01-02 NOTE — LETTER
"Transition Communication Hand-off for Care Transitions to Next Level of Care Provider    Name: Jose Spears  : 1985  MRN #: [id number]  Primary Care Provider: Physician No Ref-Primary     Primary Clinic: No address on file     Reason for Hospitalization:  Fever and chills [R50.9]  Admit Date/Time: 2019  7:15 PM  Discharge Date: ***  Payor Source: Payor: [employer] / Plan: R [employer] / Product Type: HMO /     Readmission Assessment Measure (ALAINA) Risk Score/category: ***    Plan of Care Goals/Milestone Events:   Patient Concerns: ***   Patient Goals:   Short-term ***   Long-term ***   Medical Goals   Short-term ***   Long-term ***         Reason for Communication Hand-off Referral: {CCREASONCMCTNHANDOFFREFERRALCTS:26779}    Discharge Plan:       Concern for non-adherence with plan of care:   Y/N ***  Discharge Needs Assessment:  Needs      Most Recent Value   Equipment Currently Used at Home  grab bar, tub/shower          Already enrolled in Tele-monitoring program and name of program:  ***  Follow-up specialty is recommended: {YES / NO:91249::\"Yes\"}    Follow-up plan:  No future appointments.    Any outstanding tests or procedures:        Referrals     Future Labs/Procedures    Internal Medicine Referral     Comments:    This is a post-hospitalization referral, best seen in 1 week.    Neurology Adult Referral     Comments:    Follow up MRI with white matter changes in 2-3 months    Rheumatology Referral     Comments:    Follow up adult onset Still's Disease if appt not yet made.            Key Recommendations:      [place] Malin    AVS/Discharge Summary is the source of truth; this is a helpful guide for improved communication of patient story          "

## 2019-01-03 LAB
ALBUMIN SERPL-MCNC: 2 G/DL (ref 3.4–5)
ALP SERPL-CCNC: 184 U/L (ref 40–150)
ALT SERPL W P-5'-P-CCNC: 89 U/L (ref 0–70)
ANA SER QL IF: NEGATIVE
ANION GAP SERPL CALCULATED.3IONS-SCNC: 8 MMOL/L (ref 3–14)
AST SERPL W P-5'-P-CCNC: 86 U/L (ref 0–45)
BACTERIA SPEC CULT: NO GROWTH
BILIRUB SERPL-MCNC: 0.4 MG/DL (ref 0.2–1.3)
BUN SERPL-MCNC: 10 MG/DL (ref 7–30)
CALCIUM SERPL-MCNC: 8 MG/DL (ref 8.5–10.1)
CHLORIDE SERPL-SCNC: 106 MMOL/L (ref 94–109)
CO2 SERPL-SCNC: 22 MMOL/L (ref 20–32)
CREAT SERPL-MCNC: 0.53 MG/DL (ref 0.66–1.25)
CRP SERPL-MCNC: 200 MG/L (ref 0–8)
ERYTHROCYTE [DISTWIDTH] IN BLOOD BY AUTOMATED COUNT: 13.9 % (ref 10–15)
ERYTHROCYTE [SEDIMENTATION RATE] IN BLOOD BY WESTERGREN METHOD: 20 MM/H (ref 0–15)
GFR SERPL CREATININE-BSD FRML MDRD: >90 ML/MIN/{1.73_M2}
GLUCOSE SERPL-MCNC: 145 MG/DL (ref 70–99)
HCT VFR BLD AUTO: 40.7 % (ref 40–53)
HGB BLD-MCNC: 13.4 G/DL (ref 13.3–17.7)
LACTATE BLD-SCNC: 0.6 MMOL/L (ref 0.7–2)
Lab: NORMAL
MCH RBC QN AUTO: 28.2 PG (ref 26.5–33)
MCHC RBC AUTO-ENTMCNC: 32.9 G/DL (ref 31.5–36.5)
MCV RBC AUTO: 86 FL (ref 78–100)
PLATELET # BLD AUTO: 330 10E9/L (ref 150–450)
POTASSIUM SERPL-SCNC: 4.3 MMOL/L (ref 3.4–5.3)
PROT SERPL-MCNC: 6.1 G/DL (ref 6.8–8.8)
RBC # BLD AUTO: 4.75 10E12/L (ref 4.4–5.9)
RHEUMATOID FACT SER NEPH-ACNC: <20 IU/ML (ref 0–20)
SODIUM SERPL-SCNC: 136 MMOL/L (ref 133–144)
SPECIMEN SOURCE: NORMAL
WBC # BLD AUTO: 20.5 10E9/L (ref 4–11)

## 2019-01-03 PROCEDURE — 85652 RBC SED RATE AUTOMATED: CPT | Performed by: STUDENT IN AN ORGANIZED HEALTH CARE EDUCATION/TRAINING PROGRAM

## 2019-01-03 PROCEDURE — 25000131 ZZH RX MED GY IP 250 OP 636 PS 637: Performed by: STUDENT IN AN ORGANIZED HEALTH CARE EDUCATION/TRAINING PROGRAM

## 2019-01-03 PROCEDURE — 25000132 ZZH RX MED GY IP 250 OP 250 PS 637: Performed by: STUDENT IN AN ORGANIZED HEALTH CARE EDUCATION/TRAINING PROGRAM

## 2019-01-03 PROCEDURE — 86140 C-REACTIVE PROTEIN: CPT | Performed by: STUDENT IN AN ORGANIZED HEALTH CARE EDUCATION/TRAINING PROGRAM

## 2019-01-03 PROCEDURE — 25000132 ZZH RX MED GY IP 250 OP 250 PS 637: Performed by: PEDIATRICS

## 2019-01-03 PROCEDURE — 25000125 ZZHC RX 250: Performed by: STUDENT IN AN ORGANIZED HEALTH CARE EDUCATION/TRAINING PROGRAM

## 2019-01-03 PROCEDURE — 80053 COMPREHEN METABOLIC PANEL: CPT | Performed by: STUDENT IN AN ORGANIZED HEALTH CARE EDUCATION/TRAINING PROGRAM

## 2019-01-03 PROCEDURE — 83605 ASSAY OF LACTIC ACID: CPT | Performed by: STUDENT IN AN ORGANIZED HEALTH CARE EDUCATION/TRAINING PROGRAM

## 2019-01-03 PROCEDURE — 12000001 ZZH R&B MED SURG/OB UMMC

## 2019-01-03 PROCEDURE — 85027 COMPLETE CBC AUTOMATED: CPT | Performed by: STUDENT IN AN ORGANIZED HEALTH CARE EDUCATION/TRAINING PROGRAM

## 2019-01-03 PROCEDURE — 36415 COLL VENOUS BLD VENIPUNCTURE: CPT | Performed by: STUDENT IN AN ORGANIZED HEALTH CARE EDUCATION/TRAINING PROGRAM

## 2019-01-03 RX ORDER — IBUPROFEN 200 MG
200 TABLET ORAL EVERY 6 HOURS PRN
Status: DISCONTINUED | OUTPATIENT
Start: 2019-01-03 | End: 2019-01-03

## 2019-01-03 RX ORDER — AZITHROMYCIN 250 MG/1
250 TABLET, FILM COATED ORAL DAILY
Status: COMPLETED | OUTPATIENT
Start: 2019-01-03 | End: 2019-01-04

## 2019-01-03 RX ORDER — IBUPROFEN 600 MG/1
600 TABLET, FILM COATED ORAL EVERY 6 HOURS PRN
Status: DISCONTINUED | OUTPATIENT
Start: 2019-01-03 | End: 2019-01-04 | Stop reason: HOSPADM

## 2019-01-03 RX ORDER — LOPERAMIDE HCL 2 MG
2 CAPSULE ORAL 4 TIMES DAILY PRN
Status: DISCONTINUED | OUTPATIENT
Start: 2019-01-03 | End: 2019-01-04 | Stop reason: HOSPADM

## 2019-01-03 RX ADMIN — PREDNISONE 60 MG: 50 TABLET ORAL at 08:24

## 2019-01-03 RX ADMIN — AZITHROMYCIN 250 MG: 250 TABLET, FILM COATED ORAL at 12:09

## 2019-01-03 RX ADMIN — IBUPROFEN 600 MG: 600 TABLET ORAL at 20:16

## 2019-01-03 RX ADMIN — ACETAMINOPHEN 650 MG: 325 TABLET, FILM COATED ORAL at 00:06

## 2019-01-03 ASSESSMENT — MIFFLIN-ST. JEOR: SCORE: 1985.46

## 2019-01-03 ASSESSMENT — ACTIVITIES OF DAILY LIVING (ADL)
ADLS_ACUITY_SCORE: 14
ADLS_ACUITY_SCORE: 14
ADLS_ACUITY_SCORE: 15
ADLS_ACUITY_SCORE: 14

## 2019-01-03 NOTE — ED NOTES
[facility] [place]   ED Nurse to Floor Handoff     Jose Spears is a 33 year old male who speaks English and lives with a significant other,  in a home  They arrived in the ED by car from home    ED Chief Complaint: No chief complaint on file.    ED Dx;   Final diagnoses:   Fever and chills         Needed?: No    Allergies:   Allergies   Allergen Reactions     Codeine Nausea and Vomiting     Morphine Nausea and Vomiting   .  Past Medical Hx:   Past Medical History:   Diagnosis Date     Osteogenesis imperfecta       Baseline Mental status: WDL  Current Mental Status changes: at basesline    Infection present or suspected this encounter: cultures pending  Sepsis suspected: No  Isolation type: No active isolations     Activity level - Baseline/Home:  Independent  Activity Level - Current:   Stand with Assist    Bariatric equipment needed?: No    In the ED these meds were given:   Medications   0.9% sodium chloride BOLUS (1,000 mLs Intravenous New Bag 1/2/19 2107)     Followed by   sodium chloride 0.9% infusion (not administered)   acetaminophen (TYLENOL) tablet 650 mg (650 mg Oral Given 1/2/19 2012)       Drips running?  Yes, MIVF normal saline    Home pump  No    Current LDAs  Peripheral IV 01/02/19 Right Lower forearm (Active)   Site Assessment WDL 1/2/2019  8:47 PM   Line Status Infusing 1/2/2019  8:47 PM   Phlebitis Scale 0-->no symptoms 1/2/2019  8:47 PM   Infiltration Scale 0 1/2/2019  8:47 PM   Infiltration Site Treatment Method  None 1/2/2019  8:47 PM   Extravasation? No 1/2/2019  8:47 PM   Number of days: 0       Labs results:   Labs Ordered and Resulted from Time of ED Arrival Up to the Time of Departure from the ED   LACTIC ACID WHOLE BLOOD - Abnormal; Notable for the following components:       Result Value    Lactic Acid 2.5 (*)     All other components within normal limits   FERRITIN   COMPREHENSIVE METABOLIC PANEL   CBC WITH PLATELETS DIFFERENTIAL   CRP  INFLAMMATION   ERYTHROCYTE SEDIMENTATION RATE AUTO   CYCLIC CITRULLINATED PEPTIDE ANTIBODY IGG   ANTI NUCLEAR DANIA IGG BY IFA WITH REFLEX   RHEUMATOID FACTOR   CK TOTAL   INTERLEUKIN 6 BLOOD   INTERLEUKIN 6 BLOOD   CK TOTAL   PERIPHERAL IV CATHETER   BLOOD CULTURE   BLOOD CULTURE       Imaging Studies: No results found for this or any previous visit (from the past 24 hour(s)).    Recent vital signs:   /65   Pulse 98   Temp 102.5  F (39.2  C) (Oral)   Resp 16   Ht 1.829 m (6')   Wt 102.1 kg (225 lb)   SpO2 94%   BMI 30.52 kg/m      Cardiac Rhythm: Normal Sinus  Pt needs tele? No  Skin/wound Issues: scattered rash    Code Status: unknown    Pain control: good    Nausea control: good    Abnormal labs/tests/findings requiring intervention: fever, lactic 2.5. See results review     Family present during ED course? Yes, mother and fiance at bedside   Family Comments/Social Situation comments:     Tasks needing completion: None    Yvonne Acosta, RN    4-1984 [facility]

## 2019-01-03 NOTE — CONSULTS
Rheumatology Consultation  Name: Jose Spears MRN [id number]   Admitted: 1/2/2019 Today's date: 1/3/2019     Reason for consult: AOS Evaluation   Requesting physician: MD Van             Assessment & Plan:   33 year old male with roughly 3 weeks of fever, rash, MSK complaints, pharyngitis. Labs impressive for negative RODRIGO, negative RF,  [place] [address], ESR 20, . Elevation in LFTs. LP unremarkable.  MRA showed white matter changes out of proportion to his age, etiology unclear.  Imaging otherwise unremarkable. High concern for AOS.      When considering auto-immune etiologies for his presentation need to consider   -SLE but very unlikely given negative RODRIGO, and lack of any other typical features except arthralgias  -Vasculitis, PAN, particularly. Can present with fever, arthralgia, constitutional symptoms, skin lesions. Although he has not had evidence of ischemic organ involvement. He did have  ? Of intracranial vascular insult seen on MRA)  -Drug reaction is possible, although all if his ABX and anti-virals were started after the development of his symptoms  -Neutrophilic dermatoses considered, but exam not consistent  -Malignancy is lower down on differential    Using the Rubina criteria has had 1) persistent fever for >1 week 2) arthralgias/arthritis >2 weeks 3) Negative RODRIGO/RF 4) Abnormal LFTs 5) Sore throat 6) Elevated WBC with >80% neutrophils 7) elevated AST and ALT, meeting criteria for AOS (used in research setting).     His only symptomatic complaint at this time seems to be stiffness of joints, which may also  be secondary to tendinopathy.     Did discuss his skin lesions with dermatology, who have seen fixed urticarial lesions in the context of AOSD, and with the above criteria already met, did not feel that a biopsy would necessarily change the treatment at this time.     The other aspect of his work-up was his white matter abnormalities, not expected with his age. Not  classically seen with AOSD. Would speak to Neuro regarding appropriate further work-up or follow-up, if any.    Recommendations:  -Continue 60mg prednisone daily for 1 week  -Then decrease prednisone to 50mg daily for one week  -Then decrease prednisone to 40mg for one week- maintain on this dose until seen in rheumatology clinic  -Patient will be seen in Dr. Conte's clinic on 1/28/19 at 1:20pm  -Will order x-rays of hands and feet for him to have outpatient when he comes to rheumatology clinic (ordered for outpatient setting)    At the time of the patient's outpatient rheumatology appointment, further decision will be made re: whether he would be a good candidate for IL-1, IL-6, or TNF therapy. Will need to check his insurance for coverage for these drugs.     Sea Douglas  Rheumatology Fellow  [phone]    Patient was seen and discussed with Dr. Conte who agrees with above assessment and plan.    I saw the patient with the fellow.  My exam and recomendations are as described.      Rome Conte -[phone]      Discussed plan with primary team.     Subjective   HPI:  33 year old male without significant past medical history who was initially seen in urgent care on 12/14/18 for hives and sore throat. At that time was given 5 day course of prednisone for hives. No improvement of his symptoms. He then required unplanned root canal on 12/18/18, for which the prednisone was stopped and he was started on Amoxicillin. He then presented to [facility] ED on 12/22/18 with sore throat, fevers, chills, weakness, body aches. Also had non-radiating low back pain at that time, for which he was taking tylenol with minimal relief. Had a white count at that time that was attributed to his steroid use. He was given a muscle relaxant. Regarding his rash, he was given another course of prednisone. While low on the differential, Flu/ blood cultures sent, which returned negative.    On 12/23/18 he represented with  worsening weakness, gait instability, and back pain. He was complaining at that time of weakness to the point that it was difficult and painful to lift his legs, and was walking slowly and dragging his feet. Also, complained of dark urine, intermittent headaches, dizziness, sore throat, neck stiffness, diaphoresis and subjective fever. Exam at that time showed blue sclera, diffuse muscle tenderness, motor weakness in upper and lower extremities. Also noted was a maculopapular and urticarial rash, area not specified. Neurology was consulted and concerned for GBS. LP was planned, but patient became delirious and was determined that LP would not be safe at that time. He was empirically treated for bacterial and viral meningitis. Ceftriaxone/vanc/ and acyclovir administered. He was admitted to neurology and LP ultimately performed which was unremarkable.  During that hospitalization his weakness improved, but he remained febrile. After his work-up they thought that etiology was most likely not infectious, and was concern for drug reaction, serum sickness, viral. With infection less likely, acyclovir was discontinued and patient transferred to medicine. ABX all discontinued and patient did not declare with further infection. Dermatology was consulted on 12/29/18 who identified intermittent spiking fevers, elevated ferritin, and CRP. Stills was a concern. Lesions were noted to be transient but not truly evanescent.  With symptoms and labs resolving he was ultimately discharged on 12/29/18 with close follow-up and Atarax.    He re-presented on 12/31/18 with ongoing fever, rapid strep was rechecked and was positive. He was started on Azithromycin and discharged home.    He represented yesterday 1/3/18 after receiving a phone call from his prior inpatient team with the results of his elevated ferritin and requested that he return for further evaluation for concern of AOS. He was admitted last night and 60mg prednisone  administered. IL-6, RF, and RODRIGO pending.     When evaluated today, he reports significant improvement in all symptoms except still feels mild fatigue and deconditioning. While improved, also reports that his knees and ankles feel stiff. No swelling. Rash almost resolved, although still has some on left inner thigh. Muscle pain has almost resolved. Strength is improved. No longer experiencing fever or chills. After prior ABX, he was having 6 lose stools per day. Today he had only one BM, which was formed. No blood.    ROS otherwise negative.    PMH: Osteogenesis imperfecta    PSH: Orthopedic surgery 3x on left elbow 1x on right elbow.    Inpatient Meds:  Azithromycin 250 mg daily  Prednisone 60 mg daily  Tylenol 650 mg    Allergies: Codeine, morphine    Family History: Diabetes, hypertension, stroke, colorectal cancer, osteo-dennys of the fact and his father.    Social History: Prior smoker. Social drinker. No drug use. Works as manager of convenience store.         Objective:   Temp:  [95.6  F (35.3  C)-102.5  F (39.2  C)] 95.8  F (35.4  C)  Pulse:  [72-98] 72  Heart Rate:  [66-93] 66  Resp:  [16-20] 18  BP: (103-132)/(42-82) 103/42  SpO2:  [92 %-98 %] 92 %    Physical exam:  General: Up in chair, fiancee at bedside  HEENT: EOMI, PERRL, no scleral icterus or injection, no bruits appreciated, no JVD, MMM  Cardiac: RRR, no m/r/g appreciated.   Respiratory: CTAB, no wheezes, rhonchi or crackles appreciated.  GI: NABS, NT/ND, no guarding or rebound  Extremities: No LE edema, pulses DP 2+, radial pulses 2+. Mild warmth of knees and ankles. No overt erythema/edema. No acute joints. FAROM of all joints. 5/5 strength in upper and lower extremities.   Skin: Left>right inner thigh raised erythematous rash.  Mostly in distribution of excoriation.   Neuro: AOx3, CN II-XII grossly intact, normal muscle power, normal sensory function    MSK: FAROM without evidence of inflammation of any upper or lower extremity joint. 5/5  strength in all muscle groups. Does have scarring on bilateral extensor surfaces of elbows secondary to prior traumas.    Labs:  1-3-2019    ESR 20  CMP unremarkable  CBC unremarkable except for white count of 20.5    2019  Ferritin 34,290  Interleukin-6 pending    Rheumatoid factor pending  RODRIGO pending  CCP pending    2018   ferritin 26,891    18  MPO/PR3 negative  RODRIGO negative  SSA/ SSB negative    Micro:  Preliminary blood cultures are negative  Flu A/B negative  Enteric bacteria negative  C. Diff negative  Mono negative    CSF studies 18  glucose 63  Protein 16  Gram stain negative  Culture no growth  Cell count: WBC 0, RBC 0  Anaerobic negative  Enterovirus negative  VDRL negative  HSV 1/2 negative  H flu negative  Lyme negative  Varicella negative  Blasto negative  CMV negative  [place] negative  Strep penumo  Crypto negative  Lyme negative  Neisseria negative  Polio negative  [place] negative SANJEEV negative    Cysticercosis pending  Fungal pending    HIV negative    Imagin18  MRA  Findings:   Brain MRI: No restricted diffusion. Multiple scattered small foci of  subcortical and deep cerebral white matter T2 hyperintensity. There is  no intracranial hemorrhage on susceptibility images. Ventricles are  proportionate to the cerebral sulci. Contrast-enhanced images of the  brain demonstrate no abnormal intra- or extra-axial enhancement.     The lucent lesion in the left parietal bone seen on the comparison  head CT is isointense to CSF on all sequences and demonstrates no  abnormal enhancement.     Head MRA demonstrates no aneurysm or stenosis of the major  intracranial arteries.                                                                      Impression:  1. No acute infarct or intracranial hemorrhage.  2. No abnormal enhancing lesions intracranially.  3. Cerebral white matter changes out of proportion to the patient's  age. These may reflect an underlying  vasculopathy or relate to prior  infectious or inflammatory insult(s).  4. Head MRA demonstrates no aneurysm or stenosis of the major  intracranial arteries.  5. Left parietal bone prominent intraosseous arachnoid granulation.    MR cervical spine  Impression:   1. No myelopathy. No abnormal enhancement.  2. No spinal canal or neural foraminal narrowing.    MR Brain w/ wo  Impression:  1. No acute infarct or intracranial hemorrhage.  2. No abnormal enhancing lesions intracranially.  3. Cerebral white matter changes out of proportion to the patient's  age. These may reflect an underlying vasculopathy or relate to prior  infectious or inflammatory insult(s).  4. Head MRA demonstrates no aneurysm or stenosis of the major  intracranial arteries.  5. Left parietal bone prominent intraosseous arachnoid granulation.    CT Head  Impression:   1. No acute intracranial pathology.  2. Solitary lytic lesion of the left parietal bone, likely a  hemangioma.  3. No contraindication to lumbar puncture.           10

## 2019-01-03 NOTE — H&P
[facility], [place]    History and Physical - [place] [facility] Service        Date of Admission:  1/2/2019    Assessment & Plan   Jose Spears is a 33 year old male admitted on 1/2/2019. He has a PMH of osteogenesis imperfecta and he comes to the hospital with ~3 weeks of generalized weakness, fever, myalgias,maculopapular rash, as well as lab abnormalities that include elevated LFTs, leukocytosis, and elevated ferritin, currently most concerning for adult onset Still's disease.      # Persistent fevers with rigors and chills   # Maculopapular rash   # Myalgias   # Weakness   Patient has had the above symptoms for about 1 month. He was previously admitted to the hospital for these symptoms, where he had a workup that included a normal LP, negative viral panel, negative STD screen, and negative fungal and bacterial workup. He denies any travel history to malaria endemic locations. Additionally, patient was trialed on antibiotics during previous admission, and he did not improve. As a result, infectious etiologies are less likely.  Differential includes infection, serum sickness reaction, vasculitis, malignancy, connective tissue disease, and genetic etiologies. Currently, we are suspicious of adult onset Still's disease (AOSD), which can present with the above symptoms, as well as various laboratory abnormalities (see below). CK was WNL.   - Rheumatology consult   - Start prednisone 60 mg qday tonight   - Tylenol 650 mg PRN for fevers  - Could consider XR of right wrist (to look for diffuse narrowing of the metacarpal joint-- seen in AOSD)  - Could consider TTE to rule out endocarditis (possible murmur appreciated on physical exam)  - Could consider skin biopsy   - Pending labs: Il-6, RF, RODRIGO    # Lactic acidosis   Patient coming to the hospital with lactic acid of 2.5. Patient is vitally stable and does not appear septic. Could be secondary to inflammatory process contributing to his  overall disease picture.   - NS @ 125 ml/hr  - Recheck lactate at MN    # Hyponatremia   Na of 132; could be secondary to hypovolemia.   - NS @ 125 ml/hr  - Recheck sodium on AM CMP    # Leukocytosis   # Elevated ESR, CRP  Patient most likely has an acute inflammatory process. Previous infectious work up has been negative. Leukocytosis and elevated ESR and CRP are consistent with AOSD.  - Rheum consult, prednisone as above   - Trend ESR, CRP, WBC with AM labs     # Elevated AST/ ALT  Elevated to the 80s, which is not consistent with a fulminant hepatitis. Elevated AST/ ALT consistent with AOSD.   - Rheum consult, prednisone as above  - Trend with CMP in the AM  - Can consider hepatitis panel if continue to rise     # Normocytic anemia   # Elevated ferritin   Likely associated with presumed AOSD; elevated ferritin levels is one of the laboratory abnormalities associated with AOSD.   - Monitor Hgb with AM CBC     # Hx of osteogenesis imperfecta   Not on any medications for this.     Diet: Regular  Fluids: NS @ 125 ml/hr-- stopped   DVT Prophylaxis: Ambulate every shift  Ruiz Catheter: not present  Code Status: Full    Disposition Plan   Expected discharge: 2 - 3 days, recommended to prior living arrangement once diagnosis established .  Entered: Sissy Shin MD 01/03/2019, 2:36 AM       The patient's care was discussed with the Attending Physician, Dr. Marcelo.    Jorge Araujo MD  Med [facility] Service  [facility], [place]  Pager: [phone]  Please see sticky note for cross cover information  ______________________________________________________________________    Chief Complaint   Fevers, chills, rigors, muscle aches, rash    History is obtained from the patient    History of Present Illness   Jose Spears is a 33 year old male with a PMH of osteogenesis imperfecta with recent admission between 12/23/2018 - 12/29/2018 for persistent fever, myalgias, and maculopapular rash.      In short, his symptoms started in early December when he first noticed the rash. The rash would come and go and didn't ich or hurt. At that time he didn't have any fevers. He went to urgent care and was prescribed 20 mg prednisone BID for 5 days 12/14-12/18. On 12/17, he had an emergent root canal and was prescribed amoxicillin. On 12/19/18, which was the day he stopped the prednisone he developed progressive weakness, myalgias, high fevers, and the rash reoccurred. Unlike before, the rash would occur at the same time as his fevers. The fevers and rash would occur every four hours. His weakness was so severe to the point where he couldn't walk and get out of bed. He had to pick his legs up with his arms to get out of bed.     He presented to [facility] on 12/23 and was admitted to the neurology service for concern for Guillain [place] vs encephalitis and was started on antibiotics. LP was sent and serology were unremarkable. Also his blood cultures showed no growth. He was later transferred to medicine and antibiotics were stopped. His symptoms improved off antibiotics and it was thought his presentation was 2/2 serum sickness from penicillins. On day of discharge he was still having fevers but was requesting to be discharged given the overall improvement in his clinical status.     After discharge, he continued to have the above symptoms. He came to the hospital this time because of continuation of fevers, weakness, and rash. Associated with his fevers and rash is severe chills and rigors. He estimates that he has lost at least 20 lbs over the past 3 weeks. Overall, he feels very weak, tired, and low in energy. He denies any recent travel history. In terms of sick contacts, many members of his family had strep throat, but nobody has had symptoms like his. No cough or abdominal pain. No chest pain. No shortness of breath. Diarrhea from prior admission has resolved.     Review of Systems    The 10 point Review of Systems  is negative other than noted in the HPI or here.     Past Medical History    I have reviewed this patient's medical history and updated it with pertinent information if needed.   Past Medical History:   Diagnosis Date     Osteogenesis imperfecta         Past Surgical History   I have reviewed this patient's surgical history and updated it with pertinent information if needed.  Past Surgical History:   Procedure Laterality Date     ORTHOPEDIC SURGERY          Social History   I have reviewed this patient's social history and updated it with pertinent information if needed. Jose Spears  reports that he has quit smoking. His smoking use included cigarettes. He smoked 1.00 pack per day. he has never used smokeless tobacco. He reports that he drinks alcohol. He reports that he does not use drugs.    Family History   I have reviewed this patient's family history and updated it with pertinent information if needed.   Family History   Problem Relation Age of Onset     Genetic Disorder Father         OI     Diabetes Maternal Grandmother      Hypertension Maternal Grandmother      Cerebrovascular Disease Maternal Grandmother      Diabetes Maternal Aunt      Cancer - colorectal Maternal Grandfather        Prior to Admission Medications   Prior to Admission Medications   Prescriptions Last Dose Informant Patient Reported? Taking?   acetaminophen (TYLENOL) 325 MG tablet 1/2/2019 at 2015  Yes Yes   Sig: Take 2 tablets (650 mg) by mouth every 4 hours as needed for mild pain   azithromycin (ZITHROMAX) 500 MG tablet 1/2/2019 at 0630  No Yes   Sig: Take 1 tablet (500 mg) by mouth daily for 5 days   methocarbamol (ROBAXIN) 500 MG tablet 12/23/2018  No No   Sig: Take 2 tablets (1,000 mg) by mouth 3 times daily as needed      Facility-Administered Medications: None     Allergies   Allergies   Allergen Reactions     Codeine Nausea and Vomiting     Morphine Nausea and Vomiting       Physical Exam   Vital Signs: Temp: 97.9  F (36.6   C) Temp src: Oral BP: 112/53 Pulse: 72 Heart Rate: 73 Resp: 18 SpO2: 93 % O2 Device: None (Room air)    Weight: 216 lbs 6.4 oz    Physical Exam   Constitutional: He is oriented to person, place, and time. No distress.   HENT:   Head: Normocephalic and atraumatic.   Mouth/Throat: No oropharyngeal exudate.   Eyes: EOM are normal. Pupils are equal, round, and reactive to light. No scleral icterus.   Neck: No tracheal deviation present.   Bounding carotid pulse appreciate on right neck    Cardiovascular: Normal rate and regular rhythm.   Faint systolic murmur appreciated in the left sternal border    Pulmonary/Chest: Effort normal and breath sounds normal. No stridor. No respiratory distress. He has no wheezes.   Abdominal: Soft. Bowel sounds are normal. He exhibits no distension. There is no tenderness.   Musculoskeletal: He exhibits no edema, tenderness or deformity.   Lymphadenopathy:     He has no cervical adenopathy.   Neurological: He is alert and oriented to person, place, and time. No cranial nerve deficit.   Skin: He is not diaphoretic.   Diffuse maculopapular rash visible on all exposed skin surfaces (arms, abdomen, legs). Not visualized on his face.    Psychiatric:   Cooperative but surly      Data   Data reviewed today: I reviewed all medications, new labs and imaging results over the last 24 hours. I personally reviewed no images or EKG's today.    Recent Labs   Lab 01/02/19  2048 12/31/18  1215 12/29/18  0700   WBC 21.1* 17.1* 12.2*   HGB 13.1* 12.6* 12.4*   MCV 87 82 86    253 218   * 132* 136   POTASSIUM 3.9 3.6 3.9   CHLORIDE 98 100 105   CO2 24 23 26   BUN 12 13 8   CR 0.68 0.75 0.73   ANIONGAP 10 9 6   MARIANNE 8.1* 8.1* 8.1*   GLC 77 92 96   ALBUMIN 2.2* 2.3*  --    PROTTOTAL 6.4* 6.0*  --    BILITOTAL 0.6 0.7  --    ALKPHOS 153* 107  --    ALT 87* 103*  --    AST 86* 77*  --

## 2019-01-03 NOTE — ED NOTES
Went to pt's room to start and IV and draw some blood. Pt's mom stated he is suppose to be a direct admit and wants to talk to the Ed doctor. She stated she does want an IV and blood draw started without seeing the doctor. Dr. Jc notified of families request

## 2019-01-03 NOTE — PLAN OF CARE
[id number]: Pt arrived to unit from ED at approx 2215. Admitted for rash, fever, chills/rigors. Tmax 102.5, tylenol given x1, pt very diaphoretic but no reported chills or rigors. Lactic acid recheck was 0.6. Generalized body rash unchanged overnight, no complaints of itching or pain. NS infusing via PIV at 125 ml/hr. Voiding adequately. Up with SBA to bathroom. Pt's fiance at bedside overnight, supportive. Rheumatology consult placed. Continue with POC.

## 2019-01-03 NOTE — PHARMACY-ADMISSION MEDICATION HISTORY
"Admission medication history interview status for the 1/2/2019 admission is complete. See Epic admission navigator for allergy information, pharmacy, prior to admission medications and immunization status.     Medication history interview sources:  Patient's family, Chart Review      Changes made to PTA medication list (reason)    Added: n/a    Deleted:   -hydroxyzine 25 mg PO at bedtime (Patient no longer taking)    Changed: n/a      Additional medication history information (including reliability of information, actions taken by pharmacist):  -Patient did not know his medications. Patient family helps patient manage his medications and were able to help with interview.   -Patient is currently taking a 5 day course of azithromycin that started on 12/31/2018 and is indicated for strep throat.   -Patient was previously started on 10 days of hydroxyzine for a \"rash\", but patient stopped taking after a few doses due to the rash resolving.         Prior to Admission medications    Medication Sig Last Dose Taking? Auth Provider   acetaminophen (TYLENOL) 325 MG tablet Take 2 tablets (650 mg) by mouth every 4 hours as needed for mild pain 1/2/2019 at 2015 Yes Matt Grande MD   azithromycin (ZITHROMAX) 500 MG tablet Take 1 tablet (500 mg) by mouth daily for 5 days 1/2/2019 at 0630 Yes Joseph Martinez MD   methocarbamol (ROBAXIN) 500 MG tablet Take 2 tablets (1,000 mg) by mouth 3 times daily as needed 12/23/2018  Jose Daniel Haile MD           Medication history completed by: Sonali Jackson, Pharmacy student    "

## 2019-01-03 NOTE — ED NOTES
Bed: IN03  Expected date:   Expected time:   Means of arrival:   Comments:  Jose Spears 33 M, [id number]  Recently admitted for fever and AMS. Returned on 12/31 for strep. Ferritin found to be [id number]. Concern for Adult onset Still's or HLH.

## 2019-01-03 NOTE — PLAN OF CARE
Nursing Focus: Admission  D: Arrived at 2215 from [facility]. Patient accompanied by mother and fiance. Admitted for fever and chills, diffuse rash, increased ferritin. Complains of fever symptoms.      I: Admission process began.  Patient oriented to room, enviroment, call light.  MD notified of patient's arrival on unit.     A: Temp 101.6 upon arrival, other vital signs stable.  Patient stable at this time. Diffuse pink rash over whole body, intermittent itching, but comfortable at this time. Complains of tender knee and ankle joints. No difficulty breathing.     P: Implement plan of care when available. Continue to monitor patient. Nursing interventions as appropriate. Plan is to start prednisone tonight, MIVF, manage fevers. Rheumatology consult placed. Notify MD with changes in pt status.

## 2019-01-03 NOTE — ED PROVIDER NOTES
History   No chief complaint on file.    [facility]  Jose Spears is a 33 year old male who has had 2 weeks of fevers with joint aches chills and hives that actually started 1 month ago.  He was recently admitted to the hospital without clear source for the infection from these fevers.  He is use Tylenol every 4 hours at home, last dose at 430 and as soon as it starts to wear off he starts to develop shaking chills fevers and more trouble moving around.  The patient actually first presented with a rash and then had bilateral leg weakness which resolved after several hours on being admitted to the hospital.  During this last hospitalization he was found to have a high ferritin level and they are concerned that he has adult onset stills versus a TLH.  They were called by the admitting team about the ferritin today and told to return to the hospital.  The patient feels that he has not gotten any better since being in the hospital.  He was diagnosed with strep but never had a sore throat.    I have reviewed the Medications, Allergies, Past Medical and Surgical History, and Social History in the Epic system.    Review of Systems   Constitutional: Positive for fever.   Respiratory: Negative for shortness of breath.    Cardiovascular: Negative for chest pain.   Gastrointestinal: Negative for abdominal pain.   Neurological: Positive for headaches (intermittent).   All other systems reviewed and are negative.      Physical Exam   BP: 103/50  Pulse: 98  Heart Rate: 77  Temp: 98.3  F (36.8  C)  Resp: 16  Height: 182.9 cm (6')  Weight: 102.1 kg (225 lb)  SpO2: 96 %      Physical Exam   Constitutional: He appears distressed.   Reiger's and bundled in blankets   HENT:   Head: Atraumatic.   Mouth/Throat: Oropharynx is clear and moist. No oropharyngeal exudate.   Eyes: Pupils are equal, round, and reactive to light. No scleral icterus.   Cardiovascular: Normal heart sounds and intact distal pulses.   Pulmonary/Chest: Breath sounds  normal. No respiratory distress.   Abdominal: Soft. Bowel sounds are normal. There is no tenderness. There is no CVA tenderness.   Musculoskeletal: He exhibits no edema or tenderness.   Skin: Skin is warm. Rash noted. Rash is urticarial (  Diffuse). He is not diaphoretic.       ED Course        Procedures             Critical Care time:  none  The Lactic acid level is elevated due to dehydration, at this time there is no sign of severe sepsis or septic shock.               Labs Ordered and Resulted from Time of ED Arrival Up to the Time of Departure from the ED   FERRITIN - Abnormal; Notable for the following components:       Result Value    Ferritin 34,290 (*)     All other components within normal limits   COMPREHENSIVE METABOLIC PANEL - Abnormal; Notable for the following components:    Sodium 132 (*)     Calcium 8.1 (*)     Albumin 2.2 (*)     Protein Total 6.4 (*)     Alkaline Phosphatase 153 (*)     ALT 87 (*)     AST 86 (*)     All other components within normal limits   CBC WITH PLATELETS DIFFERENTIAL - Abnormal; Notable for the following components:    WBC 21.1 (*)     Hemoglobin 13.1 (*)     All other components within normal limits   LACTIC ACID WHOLE BLOOD - Abnormal; Notable for the following components:    Lactic Acid 2.5 (*)     All other components within normal limits   CRP INFLAMMATION - Abnormal; Notable for the following components:    CRP Inflammation 210.0 (*)     All other components within normal limits   ERYTHROCYTE SEDIMENTATION RATE AUTO   CYCLIC CITRULLINATED PEPTIDE ANTIBODY IGG   ANTI NUCLEAR DANIA IGG BY IFA WITH REFLEX   RHEUMATOID FACTOR   CK TOTAL   INTERLEUKIN 6 BLOOD   PERIPHERAL IV CATHETER   BLOOD CULTURE   BLOOD CULTURE            Assessments & Plan (with Medical Decision Making)   The patient has recurrent high fevers brought on with rigors.  He was recently admitted to the hospital and not found to have a clear source of these fevers.  He was found to have a high ferritin  along with his anemia.  He was called and told to come in for admission today.  On arrival he actually looks somewhat ill and was requesting Tylenol to help with the fever as he has to take that on a scheduled basis to keep it under control.  His lactic acid level is mildly elevated at 2.5.  He is already on Zithromax for strep test that was done on Monday.  He does not have any clinical signs of progression of that and does not have any other localizing source for the infection.  Arrangements were made to admit her to the medicine service.  After re-discussing lab results with them we will hold on antibiotics given the chronicity of the fever.  His elevated lactate is likely from dehydration and will be rechecked after fluids. Blood cultures were drawn.    I have reviewed the nursing notes.    I have reviewed the findings, diagnosis, plan and need for follow up with the patient.       Medication List      There are no discharge medications for this visit.         Final diagnoses:   Fever and chills       1/2/2019   [facility], [place], EMERGENCY DEPARTMENT     Sharif Jc MD  01/02/19 [id number]

## 2019-01-03 NOTE — PLAN OF CARE
Rash is fading per MD team.  Afebrile today.  Continues on prednisone BID.  Appetite continues to be significantly decreased and fatigue continues.  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 PT consult to assist with strategies for fatigue management.  Donna Blas, is at bedside, supportive.

## 2019-01-03 NOTE — TELEPHONE ENCOUNTER
Internal Medicine Update Note    Mother contacted [place] [id number] medicine team that (along with dermatology x2) has been trying to reach out to her son Jose for possible new diagnosis of Still's Disease, made after patient discharged. He was on medicine service (after transfer from [facility]) and was being worked up for persistent fever, but as he had been feeling significantly improved while on the service, had requested discharge. A ferritin level was obtained on the last day and that ferritin level came back highly elevated after discharge.     They are now on their way to the [place] ED to be readmitted to medicine team. Mother understandably anxious regarding son's health and so please treat both with kindness. Thank you.

## 2019-01-04 ENCOUNTER — APPOINTMENT (OUTPATIENT)
Dept: GENERAL RADIOLOGY | Facility: CLINIC | Age: 34
DRG: 546 | End: 2019-01-04
Payer: COMMERCIAL

## 2019-01-04 ENCOUNTER — APPOINTMENT (OUTPATIENT)
Dept: ULTRASOUND IMAGING | Facility: CLINIC | Age: 34
DRG: 546 | End: 2019-01-04
Payer: COMMERCIAL

## 2019-01-04 ENCOUNTER — APPOINTMENT (OUTPATIENT)
Dept: PHYSICAL THERAPY | Facility: CLINIC | Age: 34
DRG: 546 | End: 2019-01-04
Payer: COMMERCIAL

## 2019-01-04 VITALS
OXYGEN SATURATION: 93 % | HEIGHT: 73 IN | RESPIRATION RATE: 20 BRPM | SYSTOLIC BLOOD PRESSURE: 117 MMHG | WEIGHT: 214.3 LBS | TEMPERATURE: 96.9 F | DIASTOLIC BLOOD PRESSURE: 55 MMHG | HEART RATE: 91 BPM | BODY MASS INDEX: 28.4 KG/M2

## 2019-01-04 LAB
ALBUMIN SERPL-MCNC: 1.8 G/DL (ref 3.4–5)
ALP SERPL-CCNC: 121 U/L (ref 40–150)
ALT SERPL W P-5'-P-CCNC: 177 U/L (ref 0–70)
ANION GAP SERPL CALCULATED.3IONS-SCNC: 11 MMOL/L (ref 3–14)
AST SERPL W P-5'-P-CCNC: 182 U/L (ref 0–45)
BILIRUB SERPL-MCNC: 0.3 MG/DL (ref 0.2–1.3)
BUN SERPL-MCNC: 17 MG/DL (ref 7–30)
CALCIUM SERPL-MCNC: 8 MG/DL (ref 8.5–10.1)
CCP AB SER IA-ACNC: 1 U/ML
CHLORIDE SERPL-SCNC: 106 MMOL/L (ref 94–109)
CO2 SERPL-SCNC: 21 MMOL/L (ref 20–32)
CREAT SERPL-MCNC: 0.54 MG/DL (ref 0.66–1.25)
ERYTHROCYTE [DISTWIDTH] IN BLOOD BY AUTOMATED COUNT: 13.9 % (ref 10–15)
GFR SERPL CREATININE-BSD FRML MDRD: >90 ML/MIN/{1.73_M2}
GLUCOSE SERPL-MCNC: 100 MG/DL (ref 70–99)
HCT VFR BLD AUTO: 36.8 % (ref 40–53)
HGB BLD-MCNC: 11.9 G/DL (ref 13.3–17.7)
MCH RBC QN AUTO: 27.9 PG (ref 26.5–33)
MCHC RBC AUTO-ENTMCNC: 32.3 G/DL (ref 31.5–36.5)
MCV RBC AUTO: 86 FL (ref 78–100)
PLATELET # BLD AUTO: 299 10E9/L (ref 150–450)
POTASSIUM SERPL-SCNC: 3.7 MMOL/L (ref 3.4–5.3)
PROT SERPL-MCNC: 5.4 G/DL (ref 6.8–8.8)
RBC # BLD AUTO: 4.26 10E12/L (ref 4.4–5.9)
SODIUM SERPL-SCNC: 138 MMOL/L (ref 133–144)
WBC # BLD AUTO: 22.4 10E9/L (ref 4–11)

## 2019-01-04 PROCEDURE — 40000193 ZZH STATISTIC PT WARD VISIT

## 2019-01-04 PROCEDURE — 97110 THERAPEUTIC EXERCISES: CPT | Mod: GP

## 2019-01-04 PROCEDURE — 76700 US EXAM ABDOM COMPLETE: CPT

## 2019-01-04 PROCEDURE — 36415 COLL VENOUS BLD VENIPUNCTURE: CPT | Performed by: STUDENT IN AN ORGANIZED HEALTH CARE EDUCATION/TRAINING PROGRAM

## 2019-01-04 PROCEDURE — 25000132 ZZH RX MED GY IP 250 OP 250 PS 637: Performed by: STUDENT IN AN ORGANIZED HEALTH CARE EDUCATION/TRAINING PROGRAM

## 2019-01-04 PROCEDURE — 97162 PT EVAL MOD COMPLEX 30 MIN: CPT | Mod: GP

## 2019-01-04 PROCEDURE — 25000131 ZZH RX MED GY IP 250 OP 636 PS 637: Performed by: STUDENT IN AN ORGANIZED HEALTH CARE EDUCATION/TRAINING PROGRAM

## 2019-01-04 PROCEDURE — 99239 HOSP IP/OBS DSCHRG MGMT >30: CPT | Mod: GC | Performed by: PEDIATRICS

## 2019-01-04 PROCEDURE — 97530 THERAPEUTIC ACTIVITIES: CPT | Mod: GP

## 2019-01-04 PROCEDURE — 25000125 ZZHC RX 250: Performed by: STUDENT IN AN ORGANIZED HEALTH CARE EDUCATION/TRAINING PROGRAM

## 2019-01-04 PROCEDURE — 85027 COMPLETE CBC AUTOMATED: CPT | Performed by: STUDENT IN AN ORGANIZED HEALTH CARE EDUCATION/TRAINING PROGRAM

## 2019-01-04 PROCEDURE — 73630 X-RAY EXAM OF FOOT: CPT | Mod: 50

## 2019-01-04 PROCEDURE — 80053 COMPREHEN METABOLIC PANEL: CPT | Performed by: STUDENT IN AN ORGANIZED HEALTH CARE EDUCATION/TRAINING PROGRAM

## 2019-01-04 PROCEDURE — 25000132 ZZH RX MED GY IP 250 OP 250 PS 637: Performed by: PEDIATRICS

## 2019-01-04 PROCEDURE — 73130 X-RAY EXAM OF HAND: CPT | Mod: LT

## 2019-01-04 RX ORDER — PREDNISONE 10 MG/1
TABLET ORAL
Qty: 150 TABLET | Refills: 0 | Status: SHIPPED | OUTPATIENT
Start: 2019-01-04 | End: 2019-02-05

## 2019-01-04 RX ORDER — DIPHENHYDRAMINE HCL 25 MG
25 CAPSULE ORAL EVERY 6 HOURS PRN
Status: DISCONTINUED | OUTPATIENT
Start: 2019-01-04 | End: 2019-01-04 | Stop reason: HOSPADM

## 2019-01-04 RX ADMIN — AZITHROMYCIN 250 MG: 250 TABLET, FILM COATED ORAL at 09:24

## 2019-01-04 RX ADMIN — DIPHENHYDRAMINE HYDROCHLORIDE 25 MG: 25 CAPSULE ORAL at 11:20

## 2019-01-04 RX ADMIN — DIPHENHYDRAMINE HYDROCHLORIDE 25 MG: 25 CAPSULE ORAL at 17:28

## 2019-01-04 RX ADMIN — PREDNISONE 60 MG: 50 TABLET ORAL at 09:25

## 2019-01-04 ASSESSMENT — ACTIVITIES OF DAILY LIVING (ADL)
ADLS_ACUITY_SCORE: 14

## 2019-01-04 ASSESSMENT — MIFFLIN-ST. JEOR: SCORE: 1975.94

## 2019-01-04 NOTE — PLAN OF CARE
Pt VSS.Has been up with SBA.C/O pain in feet and ankles. Rheumatology consult done,see MD note.No change in pt rash.Xrays ordered for feet and ibuprofen given to pt.No more c/o diarrhea.fiance I room with pt.Continue with POC.

## 2019-01-04 NOTE — PROGRESS NOTES
Internal Medicine History and Physical  [facility], [place]  Date of Admission: January 3, 2019  -----------------------------------------------------------------  Today's Updates    Afebrile during the day, improved rash appearance, less myalgia, slightly improved overall subjectively per patient    Continues on 60mg prednisone with plan for 10mg taper per rheum    Likely Still's Disease    Overall clinical picture now most consistent with Still's disease, especially with extremely elevated iron level.  Of note he meets all major Rubina criteria including fever, arthralgia/arthritis, salmon colored rash, and leukocytosis.  He also meets minor criteria including sore throat, elevated LFTs.  We are checking RODRIGO and RF as rule outs.  He does not have significant other lymphadenopathy or hepatosplenomegaly that we are aware of.  Other possible diagnoses include TNF alpha associated periodic syndrome though this would present with a more focal myalgia.  Hyper IgD could be considered but did not have splenomegaly or an IgG spike.  Schnitzler's could be considered but his rash is not very pruritic and no hyper IgM.  Sweet syndrome could be considered but his rash is less consistent with the papules, plaques, or nodules of sweet syndrome.  [place] could be considered especially with elevated ferritin but this often can have leukopenia and not the leukocytosis that he is exhibiting.  Less likely now would be a viral illness, RA, SLE, PAN, malignancy or drug reactions.    -----------------------------------------------------------------  Assessment & Plan   Jose Spears is a 33 year old male admitted on 1/2/2019. He has a PMH of osteogenesis imperfecta and he comes to the hospital with ~3 weeks of generalized weakness, fever, myalgias,maculopapular rash, as well as lab abnormalities that include elevated LFTs, leukocytosis, and elevated ferritin, currently most concerning for adult onset  Still's disease.       # Persistent fevers with rigors and chills   # Maculopapular rash   # Myalgias   # Weakness   Patient has had the above symptoms for about 1 month. He was previously admitted to the hospital for these symptoms, where he had a workup that included a normal LP, negative viral panel, negative STD screen, and negative fungal and bacterial workup. He denies any travel history to malaria endemic locations. Additionally, patient was trialed on antibiotics during previous admission, and he did not improve. As a result, infectious etiologies are less likely.  Differential includes infection, serum sickness reaction, vasculitis, malignancy, connective tissue disease, and genetic etiologies. Currently, we are suspicious of adult onset Still's disease (AOSD), which can present with the above symptoms, as well as various laboratory abnormalities (see below). CK was WNL.   - Rheumatology consult   - Start prednisone 60 mg qday tonight, 10mg weekly taper per rheum then possible disease modifying immunosuppressive therapy  - Tylenol 650 mg PRN for fevers  - [ ] Could consider XR of right wrist (to look for diffuse narrowing of the metacarpal joint-- seen in AOSD)  - [ ] Could consider TTE to rule out endocarditis (possible murmur appreciated on physical exam) though we have low suspicion  - [ ] Could consider skin biopsy   - Pending labs: Il-6, RF, RODRIGO     # Lactic acidosis   Patient coming to the hospital with lactic acid of 2.5. Patient is vitally stable and does not appear septic. Could be secondary to inflammatory process contributing to his overall disease picture.   - NS @ 125 ml/hr     # Hyponatremia   Na of 132; could be secondary to hypovolemia.   - NS @ 125 ml/hr  - Recheck sodium on AM CMP     # Leukocytosis   # Elevated ESR, CRP  Patient most likely has an acute inflammatory process. Previous infectious work up has been negative. Leukocytosis and elevated ESR and CRP are consistent with AOSD.  -  "Rheum consult, prednisone as above   - Trend ESR, CRP, WBC with AM labs      # Elevated AST/ ALT  Elevated to the 80s, which is not consistent with a fulminant hepatitis. Elevated AST/ ALT consistent with AOSD.   - Rheum consult, prednisone as above  - Trend with CMP in the AM  - Can consider hepatitis panel if continue to rise      # Normocytic anemia   # Elevated ferritin   Likely associated with presumed AOSD; elevated ferritin levels is one of the laboratory abnormalities associated with AOSD.   - Monitor Hgb with AM CBC      # Hx of osteogenesis imperfecta   Not on any medications for this.     Diet: Regular  Fluids: NS @ 125 ml/hr-- stopped   DVT Prophylaxis: Ambulate every shift  Ruiz Catheter: not present  Code Status: Full  Expected discharge: 1 - 3 days, recommended to prior living arrangement once diagnosis established .    Matt Grande MD  [facility]  Pager: [phone]  -----------------------------------------------------------------  Physical Exam   /56 (BP Location: Right arm)   Pulse 77   Temp 98.3  F (36.8  C)   Resp 16   Ht 1.854 m (6' 1\")   Wt 98.2 kg (216 lb 6.4 oz)   SpO2 92%   BMI 28.55 kg/m      Physical Exam   Constitutional: He is oriented to person, place, and time. No distress.   HENT:   Head: Normocephalic and atraumatic.   Mouth/Throat: No oropharyngeal exudate.   Eyes: EOM are normal. Pupils are equal, round, and reactive to light. No scleral icterus.   Neck: No tracheal deviation present.   Cardiovascular: Normal rate and regular rhythm.   Faint systolic murmur appreciated in the left sternal border    Pulmonary/Chest: Effort normal and breath sounds normal. No stridor. No respiratory distress. He has no wheezes.   Abdominal: Soft. Bowel sounds are normal. He exhibits no distension. There is no tenderness.   Musculoskeletal: He exhibits no edema, tenderness or deformity.   Lymphadenopathy:     He has no cervical adenopathy.   Neurological: He is " alert and oriented to person, place, and time. No cranial nerve deficit.   Skin: He is not diaphoretic.   Diffuse maculopapular rash visible on arms, abdomen, legs; more so underneath legs. See photos. Not visualized on his face.    Psychiatric:   Cooperative    I have seen this patient with the resident teaching team,  examined patient independently, and agree with above note and exam.    Jonh Mitchell MD  [facility], pager [phone]

## 2019-01-04 NOTE — PROGRESS NOTES
01/04/19 0813   Quick Adds   Type of Visit Initial PT Evaluation   Living Environment   Lives With child(katie), dependent;spouse   Living Arrangements house   Home Accessibility stairs to enter home;stairs within home   Living Environment Comment Pt lives with wife in a split entry home where are 2 stairs to enter home then a flight of stairs up or down.   Self-Care   Usual Activity Tolerance excellent   Current Activity Tolerance moderate   Equipment Currently Used at Home grab bar, tub/shower   Activity/Exercise/Self-Care Comment Pt is a  for both sons and works at [employer] as a  where he averages 13,000 steps a day.   Functional Level Prior   Ambulation 0-->independent   Transferring 0-->independent   Toileting 0-->independent   Bathing 0-->independent   Communication 0-->understands/communicates without difficulty   Swallowing 0-->swallows foods/liquids without difficulty   Cognition 0 - no cognition issues reported   Fall history within last six months no   Which of the above functional risks had a recent onset or change? none   Prior Functional Level Comment Pt is UP IND for ADLs and functional mobility however, over the past several days has been experiencing increased muscle and joint pain, stiffiness, and increased fatigue that has been limiting his daily activity tolerance.   General Information   Onset of Illness/Injury or Date of Surgery - Date 01/02/19   Referring Physician Jonh Mitchell MD   Patient/Family Goals Statement Improve engery conservation, increase endurance and strength   Pertinent History of Current Problem (include personal factors and/or comorbidities that impact the POC) Per chart: 33 year old male with roughly 3 weeks of fever, rash, MSK complaints, pharyngitis. Labs impressive for negative RODRIGO, [place] [address], ESR 20, . Elevation in LFTs. LP unremarkable.  MRA showed white matter changes out of proportion to his  "age, etiology unclear.  Imaging otherwise unremarkable. High concern for AOS. Providers have ruled out all other possible dx where providers have ruled in Stills Disease.   General Observations Pt is UP IND.   General Info Comments Activity: Up ad pepper/Ambulate   Cognitive Status Examination   Orientation orientation to person, place and time   Level of Consciousness alert   Follows Commands and Answers Questions 100% of the time   Personal Safety and Judgment intact   Memory intact   Range of Motion (ROM)   ROM Comment WFL for functional mobility   Strength   Strength Comments decreased gross strength   Bed Mobility   Bed Mobility Comments IND   Transfer Skills   Transfer Comments IND   Gait   Gait Comments IND   General Therapy Interventions   Planned Therapy Interventions strengthening;risk factor education;home program guidelines;progressive activity/exercise   Clinical Impression   Criteria for Skilled Therapeutic Intervention yes, treatment indicated   PT Diagnosis Pt is UP IND for ADLs and functional mobility however, over the past several days has been experiencing increased muscle and joint pain, stiffiness, and increased fatigue that has been limiting his daily activity tolerance therefore, IP to follow to address these areas of need to maximize functional mobility.   Clinical Presentation Evolving/Changing   Clinical Presentation Rationale clincal judgement and chart review   Clinical Decision Making (Complexity) Moderate complexity   Therapy Frequency` 3 times/week   Predicted Duration of Therapy Intervention (days/wks) days   Anticipated Discharge Disposition Home with Assist;Home with Outpatient Therapy   Risk & Benefits of therapy have been explained Yes   Patient, Family & other staff in agreement with plan of care Yes   Clinical Impression Comments Evaluation completed. Treatment indicated (see PT diagnosis). PT recommends OP PT and HEP.   [facility] AM-PAC TM \"6 Clicks\"   2016, Trustees of " "[employer], under license to [employer].  All rights reserved.   6 Clicks Short Forms Basic Mobility Inpatient Short Form   [employer] AM-PAC  \"6 Clicks\" V.2 Basic Mobility Inpatient Short Form   1. Turning from your back to your side while in a flat bed without using bedrails? 4 - None   2. Moving from lying on your back to sitting on the side of a flat bed without using bedrails? 4 - None   3. Moving to and from a bed to a chair (including a wheelchair)? 4 - None   4. Standing up from a chair using your arms (e.g., wheelchair, or bedside chair)? 4 - None   5. To walk in hospital room? 4 - None   6. Climbing 3-5 steps with a railing? 4 - None   Basic Mobility Raw Score (Score out of 24.Lower scores equate to lower levels of function) 24   Total Evaluation Time   Total Evaluation Time (Minutes) 10     "

## 2019-01-04 NOTE — PLAN OF CARE
"Discharge Planner PT  7D  Patient plan for discharge: home with HEP  Current status: Initiated and complete PT evaluation. Treatment indicated. Pt is UP IND. New dx of Stills Disease where pt has \"flare ups\" with increased joint and muscle pain, along with fatigue. PT educated pt on LE/UE strengthening and aerobic exercise program for a HEP at home. To promote functional endurance, pt completed 16 minutes 30 seconds on bike with resistance - PT educated pt on possible getting a gym membership for a non-weight bearing option for aerobic exercise. Educated pt on fatigue management/energy conversation techniques for during \"flare ups.\" PT provided handouts for all education/HEP listed above. IP PT to follow for continued functional endurance and strengthening.  Barriers to return to prior living situation: medical status  Recommendations for discharge: Home with wife + HEP  Rationale for recommendations: Pt is safe to disch home when medically stable. Pt would benefit from continued OP PT if interested - PT did discuss however, ultimately recommended daily HEP and obtaining a gym membership for use of bike if pt can hold himself accountable vs OP PT.       Entered by: Alexa Kraft 01/04/2019 9:12 AM       "

## 2019-01-04 NOTE — PROGRESS NOTES
"CLINICAL NUTRITION SERVICES - ASSESSMENT NOTE     Nutrition Prescription    RECOMMENDATIONS FOR MDs/PROVIDERS TO ORDER:  None at this time     Malnutrition Status:    Unable to determine due to pt busy with providers during attempts to visit    Recommendations already ordered by Registered Dietitian (RD):  PRN supplements    Future/Additional Recommendations:  Encourage patient to consume at least 75% of meals TID + 2 snacks/supplements daily, or the equivalent.  If consuming less than this offer scheduled supplements, scheduled snacks, and/or consider ordering calorie counts to assess PO intake adequacy.  Consider multivitamin with minerals if PO intake remains poor.     REASON FOR ASSESSMENT  Jose Spears is a/an 33 year old male assessed by the dietitian for Admission Nutrition Risk Screen for reduced oral intake over the last month    NUTRITION HISTORY  PMH includes osteogenesis imperfecta.  Admit with 3-4 weeks of weakness, feveres, myalgias, rash, and elevated LFTs.  Now with likely Still's disease.  Obtained information from chart, pt busy with providers during attempts to visit    CURRENT NUTRITION ORDERS  Diet: Regular  Intake/Tolerance: decreased appetite noted    LABS  Labs reviewed    MEDICATIONS  Medications reviewed    ANTHROPOMETRICS  Height: 185.4 cm (6' 1\")  Most Recent Weight: 97.2 kg (214 lb 4.8 oz)    IBW: 83.6 kg   BMI: Overweight BMI 25-29.9  Weight History: 11 lb wt loss over the past 5 days?.  Pt admit reporting 20 lb wt loss over the past 3 weeks (limited recorded wt data to confirm)  Wt Readings from Last 10 Encounters:   01/04/19 97.2 kg (214 lb 4.8 oz)   12/31/18 102.1 kg (225 lb)   12/23/18 102.1 kg (225 lb)   12/22/18 102.1 kg (225 lb)   09/24/15 95.8 kg (211 lb 3.2 oz)   09/23/15 96.2 kg (212 lb)   09/20/15 95.3 kg (210 lb)   05/13/14 86 kg (189 lb 8 oz)      Dosing Weight: 97 kg (actual)    ASSESSED NUTRITION NEEDS  Estimated Energy Needs: 0320-7899 kcals/day (25 - 30 " kcals/kg)  Justification: Maintenance  Estimated Protein Needs:  grams protein/day (1 - 1.2 grams of pro/kg)  Justification: Hypercatabolism with acute illness  Estimated Fluid Needs: 1619-8897 mL/day (1 mL/kcal)   Justification: Maintenance, or other per provider pending fluid status    PHYSICAL FINDINGS  See malnutrition section below.    MALNUTRITION  % Intake: Unable to assess  % Weight Loss: Unable to assess  Subcutaneous Fat Loss: Unable to assess  Muscle Loss: Unable to assess  Fluid Accumulation/Edema: None noted per provider note yesterday  Malnutrition Diagnosis: Unable to determine due to pt busy with providers during attempts to visit    NUTRITION DIAGNOSIS  Inadequate oral intake related to decreased appetite as evidenced by reported decreased appetite and reported 20 lbs weight loss x 3-4 weeks PTA     INTERVENTIONS  Implementation  Nutrition Education: Unable to complete due to pt busy with providers during attempts to visit  Medical food supplement therapy     Goals  Patient to consume % of nutritionally adequate meal trays TID, or the equivalent with supplements/snacks.     Monitoring/Evaluation  Progress toward goals will be monitored and evaluated per protocol.     Enid Delgado RD, LD   6A/7D RD Pager: [phone]

## 2019-01-04 NOTE — CONSULTS
Brief Neuro Note    Reviewed MRI Brain WOW from 12/24. There are some bilateral subcortical white matter T2 hyperintense lesions that do not contrast enhance. There are no physical findings that correlate to these anatomical locations based on noted exams. There is no past history of sensory or motor disturbances that might raise suspicion for episodes of demyelination. The patient reportedly smokes 1 pack cigarettes/day which can certainly causes similar lesions. Such lesions can also be observed in young migraine patients. In the absence of clinical correlation of the lesions, no further workup recommended. He can follow up in Neuro clinic in 2-3 months and we can consider further imaging at that time. Should he develop neuro changes in the interim, he should be evaluated asap. This was discussed on the phone w/ the primary team. Please call if further questions or concerns.    Fermín Salinas MD  Neurology PGY4

## 2019-01-05 ENCOUNTER — TELEPHONE (OUTPATIENT)
Dept: RHEUMATOLOGY | Facility: CLINIC | Age: 34
End: 2019-01-05

## 2019-01-05 DIAGNOSIS — M06.1 ADULT-ONSET STILL'S DISEASE (H): Primary | ICD-10-CM

## 2019-01-05 DIAGNOSIS — M06.1 ADULT STILL'S DISEASE (H): ICD-10-CM

## 2019-01-05 NOTE — PLAN OF CARE
[id number]:  Tmax 100.9 oral, used cool wash cloths and temp decreased to 96.9 oral, OVSS on RA.  Given 25 mg PRN PO Benadryl x 2 for itchiness r/t body rash.  Good PO intake.  PT consult done.  Up with SBA.    Discharge  D: Orders for discharge and outpatient medications written.  I: Home medications and return to clinic schedule reviewed with patient. Discharge instructions and parameters for calling Health Care Provider reviewed. Patient left at 1848 accompanied by his significant other.   A: Patient/family verbalized understanding and was ready for discharge.   P: Patient instructed to  medications in Pharmacy. Follow up as scheduled TBD.

## 2019-01-05 NOTE — TELEPHONE ENCOUNTER
Staff message sent to the pool asking for assistance in scheduling pt's appointment on 1/28/19 at 1:20 pm, also requesting that pt be notified of this appointment.    ARTEM BuenoN RN  Rheumatology RN Coordinator  SEBASTIEN Reyes

## 2019-01-05 NOTE — DISCHARGE SUMMARY
[facility], [place]  Discharge Summary       Date of Admission:  1/2/2019  Date of Discharge:  1/4/19  Discharging Provider: Dr. Mitchell   Discharge Service: Nir 3    Discharge Diagnoses   Adult Onset Still's Disease    Follow-ups Needed After Discharge   Follow-up Appointments     Adult [facility] Follow-up and recommended labs and tests      Please see your primary physician within 1 week. If you would like a new primary doctor at the [facility], please call the number below.    Appointments on [place] and/or [facility] (with [facility] or [facility] provider or service). Call [phone] if you haven't heard regarding these appointments within 7 days of discharge.             Additional important follow-up instructions/to-do's for PCP: please obtain CMP, CBC, CRP/ESR; ensure follow-up with rheumatology    Unresulted Labs Ordered in the Past 30 Days of this Admission     Date and Time Order Name Status Description    1/2/2019 2048 Interleukin 6 Blood In process     1/2/2019 2009 Blood culture Preliminary     1/2/2019 2009 Blood culture Preliminary     12/25/2018 1127 Cysticercosis antibody IgG CSF: In process     12/25/2018 1127 Fungus Culture, non-blood Tube 2 Preliminary     12/23/2018 1554 Anaerobic CSF culture Preliminary       These results will be followed up by PCP and rheumatology    Hospital Course   Jose Spears 33M was admitted on 1/2/2019 with a PMH of osteogenesis imperfecta with recent admission between 12/23/2018 - 12/29/2018 for persistent fever, myalgias, and maculopapular rash.      In short, his symptoms started in early December when he first noticed the rash. The rash would come and go and didn't ich or hurt. At that time he didn't have any fevers. He went to urgent care and was prescribed 20 mg prednisone BID for 5 days 12/14-12/18. On 12/17, he had an emergent root canal and was prescribed amoxicillin. On 12/19/18, which was the day he stopped  the prednisone he developed progressive weakness, myalgias, high fevers, and the rash reoccurred. Unlike before, the rash would occur at the same time as his fevers. The fevers and rash would occur every four hours. His weakness was so severe to the point where he couldn't walk and get out of bed. He had to pick his legs up with his arms to get out of bed.      He presented to [facility] on 12/23 and was admitted to the neurology service for concern for Guillain Vernon vs encephalitis and was started on antibiotics. LP was sent and serology were unremarkable. Also his blood cultures showed no growth. He was later transferred to medicine and antibiotics were stopped. His symptoms improved off antibiotics and it was thought his presentation was 2/2 serum sickness from penicillins. On day of discharge he was still having fevers but was requesting to be discharged given the overall improvement in his clinical status.      After discharge, he continued to have the above symptoms. He came to the hospital this time because of continuation of fevers, weakness, and rash. Associated with his fevers and rash was severe chills and rigors. He estimates that he has lost at least 20 lbs over the past 3 weeks. Overall, he felt very weak, tired, and low in energy.     Prior to admission, he was most notably found to have a high ferritin over 20,000 which indicated likely Adult Onset Still's Disease. The author contacted the patient and encouraged to return to the hospital.    # Persistent fevers with rigors and chills   # Maculopapular rash   # Myalgias   # Weakness   Patient has had the above symptoms for about 1 month. He was previously admitted to the hospital for these symptoms, where he had a workup that included a normal LP, negative viral panel, negative STD screen, and negative fungal and bacterial workup. He denies any travel history to malaria endemic locations. Additionally, patient was trialed on antibiotics during previous  admission, and he did not improve. As a result, infectious etiologies are less likely.   Xrays of joints were normal. Negative RF, RODRIGO    Overall clinical picture now most consistent with Still's disease, especially with extremely elevated iron level.  Of note he meets all major Rubina criteria including fever, arthralgia/arthritis, salmon colored rash, and leukocytosis.  He also meets minor criteria including sore throat, elevated LFTs and a negative RODRIGO and RF as rule outs.  He does not have significant other lymphadenopathy but did have hepatosplenomegaly on RUQUS as well, thus meeting virtually all the criteria and with other workup negative (ultimately clinically is a diagnosis of exclusion).      Other possible diagnoses considered included TNF alpha associated periodic syndrome though this would present with a more focal myalgia.  Hyper IgD could be considered but did not have splenomegaly or an IgG spike.  Schnitzler's could be considered but his rash is not very pruritic and no hyper IgM.  Sweet syndrome could be considered but his rash is less consistent with the papules, plaques, or nodules of sweet syndrome.  HLH could be considered especially with elevated ferritin but this often can have leukopenia and not the leukocytosis that he is exhibiting.  Less likely now would be a viral illness, RA, SLE, PAN, malignancy or drug reactions.    Treatment plan: Started prednisone 60 mg qday 1/2/18, 10mg weekly taper per rheum then possible disease modifying immunosuppressive therapy  - Tylenol 650 mg PRN for fevers  - Pending labs: Il-6    # Lactic acidosis   Patient coming to the hospital with lactic acid of 2.5. Patient was vitally stable and does not appear septic. Could be secondary to inflammatory process contributing to his overall disease picture.   - NS @ 125 ml/hr     # Leukocytosis   # Elevated ESR, CRP  Leukocytosis and elevated ESR and CRP are consistent with AOSD.     # Elevated AST/ ALT   Elevated  AST/ ALT consistent with AOSD.   - Followup with CMP with PCP     # Normocytic anemia   # Elevated ferritin   Likely associated with presumed AOSD; elevated ferritin levels is one of the laboratory abnormalities associated with AOSD.      # Hx of osteogenesis imperfecta   Not on any medications for this.        Consultations This Hospital Stay   RHEUMATOLOGY IP CONSULT  RHEUMATOLOGY IP CONSULT  MEDICATION HISTORY IP PHARMACY CONSULT  PHYSICAL THERAPY ADULT IP CONSULT  NEUROLOGY GENERAL ADULT IP CONSULT  NEUROLOGY GENERAL ADULT IP CONSULT    Code Status   Full Code       Matt Grande MD  [facility], [place]    ______________________________________________________________________          Physical Exam   Vital Signs: Temp: 96.9  F (36.1  C) Temp src: Oral BP: 117/55 Pulse: 91 Heart Rate: 82 Resp: 20 SpO2: 93 % O2 Device: None (Room air)    Weight: 214 lbs 4.8 oz  Physical Exam   Constitutional: He is oriented to person, place, and time. No distress.   HENT:   Head: Normocephalic and atraumatic.   Mouth/Throat: No oropharyngeal exudate.   Eyes: EOM are normal. Pupils are equal, round, and reactive to light. No scleral icterus.   Neck: No tracheal deviation present.   Cardiovascular: Normal rate and regular rhythm.   Faint systolic murmur appreciated in the left sternal border    Pulmonary/Chest: Effort normal and breath sounds normal. No stridor. No respiratory distress. He has no wheezes.   Abdominal: Soft. Bowel sounds are normal. He exhibits no distension. There is no tenderness.   Musculoskeletal: He exhibits no edema, tenderness or deformity.   Lymphadenopathy:     He has no cervical adenopathy.   Neurological: He is alert and oriented to person, place, and time. No cranial nerve deficit.   Skin: He is not diaphoretic.   Diffuse maculopapular rash visible on arms, abdomen, legs; more so underneath legs. See photos. Not visualized on his  face.    Psychiatric:   Cooperative          Primary Care Physician   Physician No Ref-Primary    Discharge Disposition   Discharged to home  Condition at discharge: Good    Significant Results and Procedures   1/2/19: X-rays of bilateral hands and feet: normal    RUQUS 1/2/19:  IMPRESSION:   1.  Hepatomegaly with area of likely focal fatty deposition adjacent  to the falciform ligament  2.  Splenomegaly.  3.  Left pleural effusion.    Discharge Orders      Internal Medicine Referral      Neurology Adult Referral      Rheumatology Referral      Reason for your hospital stay    Fever, rash, joint pain     Activity    Your activity upon discharge: activity as tolerated     Discharge Instructions    You were treated for what is at this time most likely Adult Onset Still's Disease.   Please taper steroids from 60mg a day down 10mg each week (60->50->40->30->20->10mg).  Please follow-up with rheumatology.  Also please follow-up with neurology as well in 2-3 months.    It has been a pleasure to care for you.     Adult [facility] Follow-up and recommended labs and tests    Please see your primary physician within 1 week. If you would like a new primary doctor at the [facility], please call the number below.    Appointments on [place] and/or [facility] (with [facility] or [facility] provider or service). Call [phone] if you haven't heard regarding these appointments within 7 days of discharge.     Diet    Follow this diet upon discharge: regular     Discharge Medications   Current Discharge Medication List      START taking these medications    Details   predniSONE (DELTASONE) 10 MG tablet Take 6 tablets once a day for one week (through Jan 9), 5 tablets for one week (until Jan 16), 4 tablets for one week (until Jan 23), 3 tablets for one week (until Jan 30), 2 tablets for one week (until Feb 6), 1 tablet for one week (until Feb 13). Avoid NSAID type meds (ibuprofen) while taking..  Qty: 150 tablet, Refills: 0     Associated Diagnoses: Still's disease (H)         CONTINUE these medications which have NOT CHANGED    Details   acetaminophen (TYLENOL) 325 MG tablet Take 2 tablets (650 mg) by mouth every 4 hours as needed for mild pain         STOP taking these medications       azithromycin (ZITHROMAX) 500 MG tablet Comments:   Reason for Stopping:         methocarbamol (ROBAXIN) 500 MG tablet Comments:   Reason for Stopping:             Allergies   Allergies   Allergen Reactions     Codeine Nausea and Vomiting     Morphine Nausea and Vomiting     I have seen this patient with the resident teaching team,  examined patient independently, and agree with above note and exam.  > 40 minutes spent on care of patient day of discharge, including 15 face to face and 15 in coordination of care    Jonh Mitchell MD  Med-Peds Hospitalist, pager [phone]

## 2019-01-05 NOTE — TELEPHONE ENCOUNTER
----- Message from Rome Conte MD sent at 1/3/2019  9:04 PM CST -----  Ignacio Mohr;     Please add this pt. On 1/28 @ 1:20 PM; Sea will see the pt. And then I will staff.     Please set him up for Anakinra so that we can start a PA. Diagnosis is Adult onset stills

## 2019-01-06 ENCOUNTER — PATIENT OUTREACH (OUTPATIENT)
Dept: CARE COORDINATION | Facility: CLINIC | Age: 34
End: 2019-01-06

## 2019-01-07 LAB
BACTERIA SPEC CULT: ABNORMAL
Lab: ABNORMAL
SPECIMEN SOURCE: ABNORMAL

## 2019-01-07 NOTE — PLAN OF CARE
Physical Therapy Discharge Summary    Reason for therapy discharge:    Discharged to home.    Progress towards therapy goal(s). See goals on Care Plan in [facility] electronic health record for goal details.  Goals partially met.  Barriers to achieving goals:   discharge from facility.    Therapy recommendation(s):    Continue home exercise program.

## 2019-01-08 LAB
BACTERIA SPEC CULT: NO GROWTH
BACTERIA SPEC CULT: NO GROWTH
IL6 SERPL-MCNC: 291.38 PG/ML
Lab: NORMAL
Lab: NORMAL
SPECIMEN SOURCE: NORMAL
SPECIMEN SOURCE: NORMAL

## 2019-01-09 NOTE — TELEPHONE ENCOUNTER
[facility] Call Center    Phone Message    May a detailed message be left on voicemail: yes    Reason for Call: Other: Pt's Mom Nereida:    Pt's Mom calling in to report that pt's sx's have increased from the ED visit, she was hoping pt can be seen sooner for an appt. Pt's Mom states 1/28/19 it's too far and she doesn't think her Son will make it, Writer offered to put pt on a waiting list. Pt's Mom is also requesting for a letter to be written from Dr. Conte since pt can't go to work and perform his work duties, please advise thanks. Writer did relay to pt's Mom since we do not have a written consent to speak with her, that we may call the pt regarding her request   Please e-mail letter to pt's Mom e-mail address - [email or web address]   Phone for Mom: [phone]     Action Taken: Message routed to:  [facility] RHEUM

## 2019-01-10 LAB — T SOL LAR IGG CSF-ACNC: NORMAL

## 2019-01-10 NOTE — TELEPHONE ENCOUNTER
[facility] Call Center    Phone Message    May a detailed message be left on voicemail: yes    Reason for Call: Other: Pt's Fiance Donna:   Calling regarding pt's Prednisone, pt is in a lot of pain still and the mediation is not working per Donna. Pt is wondering what they can do in the meantime while they wait for an appt, please advise thanks. Writer relayed if pt is in severe pain to hang up and dial 911. - They are requesting a call back     Action Taken: Message routed to:  [facility] RHEUM

## 2019-01-10 NOTE — TELEPHONE ENCOUNTER
"Returned call to Donna and explained that I wasn't able to talk with her about pt's medical issues due HIPPA and we did not have a signed consent to do so.    Called pt at his home. At first he was saying he was doing fine, no problems. I did explain that his finance had called reporting he was having extreme pain and problems. Pt did then say he had been having pain and stiffness of his left knee, both calves, and ankles. Jose will also get pain in his rib cage area on his left side as well as the left mid-back. It comes and goes. He did work 1/2 day yesterday, sat at his desk reading emails. Pt came home home exhausted, took a 4 hour nap, then went to bed and slept. In the morning he was so stiff and had so much pain that he couldn't move. Put the pain at 7.5-8/10 on the pain scale. He was able to get up after a couple of hours when his \"steroids kicked in\". Pt also endorses tossing and turning all night, night sweats causing his sleeping to not be restful.     Pt had decreased his prednisone to 50 mg today from 60 mg yesterday.    Jose is requesting a letter from his MD taking him off of work because he doesn't feel he can do it after yesterdays experience. 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 He is usually on his feet all day, does a lot of physical work. Pt is scheduled to be seen for his post hospital check up on 1/28/19. Will contact fellow for recommendations. Pt is agreeable to this plan.    ARTEM BuenoN RN  Rheumatology RN Coordinator  [facility]    "

## 2019-01-11 NOTE — TELEPHONE ENCOUNTER
Consulted with Dr Douglas. He recommends that pt increase his prednisone to 60 mg again. Provider is willing to write a letter to excuse pt from work until we re-evaluate him on 1/28/19.    Returned call to pt with the above iformation. Pt is thankful for the letter and understands he is to increase the prednisone to 60 mg once daily. We did discuss side effects of prednisone such as insomnia, anxiety, increased appetite to name a few.    Pt requests that the letter be emailed to him at [email or web address]. Told pt that we would try. Printed copy of letter given to Fawn along with pt's email, she will email it to the pt.    ARTEM BuenoN RN  Rheumatology RN Coordinator   Eric

## 2019-01-14 NOTE — TELEPHONE ENCOUNTER
Work note emailed to patient per his request at [email or web address].  Fawn Castanon CMA  1/14/2019 8:45 AM

## 2019-01-22 LAB
FUNGUS SPEC CULT: NORMAL
Lab: NORMAL
SPECIMEN SOURCE: NORMAL

## 2019-01-28 ENCOUNTER — OFFICE VISIT (OUTPATIENT)
Dept: PULMONOLOGY | Facility: CLINIC | Age: 34
End: 2019-01-28
Attending: INTERNAL MEDICINE
Payer: COMMERCIAL

## 2019-01-28 VITALS
DIASTOLIC BLOOD PRESSURE: 75 MMHG | OXYGEN SATURATION: 97 % | HEIGHT: 72 IN | BODY MASS INDEX: 28.85 KG/M2 | SYSTOLIC BLOOD PRESSURE: 113 MMHG | RESPIRATION RATE: 18 BRPM | HEART RATE: 68 BPM | WEIGHT: 213 LBS | TEMPERATURE: 97.7 F

## 2019-01-28 DIAGNOSIS — M08.20 STILL'S DISEASE (H): ICD-10-CM

## 2019-01-28 DIAGNOSIS — Z79.52 ON PREDNISONE THERAPY: Primary | ICD-10-CM

## 2019-01-28 DIAGNOSIS — Z51.81 ENCOUNTER FOR THERAPEUTIC DRUG MONITORING: ICD-10-CM

## 2019-01-28 DIAGNOSIS — M08.20 STILL'S DISEASE (H): Primary | ICD-10-CM

## 2019-01-28 LAB
ALBUMIN SERPL-MCNC: 3 G/DL (ref 3.4–5)
ALP SERPL-CCNC: 88 U/L (ref 40–150)
ALT SERPL W P-5'-P-CCNC: 62 U/L (ref 0–70)
AST SERPL W P-5'-P-CCNC: 35 U/L (ref 0–45)
BASOPHILS # BLD AUTO: 0 10E9/L (ref 0–0.2)
BASOPHILS NFR BLD AUTO: 0.3 %
BILIRUB DIRECT SERPL-MCNC: 0.2 MG/DL (ref 0–0.2)
BILIRUB SERPL-MCNC: 0.5 MG/DL (ref 0.2–1.3)
CRP SERPL-MCNC: 54.4 MG/L (ref 0–8)
DIFFERENTIAL METHOD BLD: ABNORMAL
EOSINOPHIL # BLD AUTO: 0 10E9/L (ref 0–0.7)
EOSINOPHIL NFR BLD AUTO: 0.3 %
ERYTHROCYTE [DISTWIDTH] IN BLOOD BY AUTOMATED COUNT: 13.6 % (ref 10–15)
ERYTHROCYTE [SEDIMENTATION RATE] IN BLOOD BY WESTERGREN METHOD: 34 MM/H (ref 0–15)
FERRITIN SERPL-MCNC: 7401 NG/ML (ref 26–388)
HCT VFR BLD AUTO: 40.4 % (ref 40–53)
HGB BLD-MCNC: 12.8 G/DL (ref 13.3–17.7)
IMM GRANULOCYTES # BLD: 0.1 10E9/L (ref 0–0.4)
IMM GRANULOCYTES NFR BLD: 0.9 %
LYMPHOCYTES # BLD AUTO: 1.8 10E9/L (ref 0.8–5.3)
LYMPHOCYTES NFR BLD AUTO: 12.7 %
MCH RBC QN AUTO: 26.3 PG (ref 26.5–33)
MCHC RBC AUTO-ENTMCNC: 31.7 G/DL (ref 31.5–36.5)
MCV RBC AUTO: 83 FL (ref 78–100)
MONOCYTES # BLD AUTO: 0.4 10E9/L (ref 0–1.3)
MONOCYTES NFR BLD AUTO: 2.7 %
NEUTROPHILS # BLD AUTO: 11.5 10E9/L (ref 1.6–8.3)
NEUTROPHILS NFR BLD AUTO: 83.1 %
NRBC # BLD AUTO: 0 10*3/UL
NRBC BLD AUTO-RTO: 0 /100
PLATELET # BLD AUTO: 313 10E9/L (ref 150–450)
PROT SERPL-MCNC: 7.3 G/DL (ref 6.8–8.8)
RBC # BLD AUTO: 4.87 10E12/L (ref 4.4–5.9)
WBC # BLD AUTO: 13.8 10E9/L (ref 4–11)

## 2019-01-28 PROCEDURE — 85652 RBC SED RATE AUTOMATED: CPT | Performed by: INTERNAL MEDICINE

## 2019-01-28 PROCEDURE — 85025 COMPLETE CBC W/AUTO DIFF WBC: CPT | Performed by: INTERNAL MEDICINE

## 2019-01-28 PROCEDURE — 82728 ASSAY OF FERRITIN: CPT | Performed by: INTERNAL MEDICINE

## 2019-01-28 PROCEDURE — 36415 COLL VENOUS BLD VENIPUNCTURE: CPT | Performed by: INTERNAL MEDICINE

## 2019-01-28 PROCEDURE — 86140 C-REACTIVE PROTEIN: CPT | Performed by: INTERNAL MEDICINE

## 2019-01-28 PROCEDURE — G0463 HOSPITAL OUTPT CLINIC VISIT: HCPCS | Mod: ZF

## 2019-01-28 PROCEDURE — 80076 HEPATIC FUNCTION PANEL: CPT | Performed by: INTERNAL MEDICINE

## 2019-01-28 RX ORDER — PREDNISONE 10 MG/1
TABLET ORAL
Qty: 150 TABLET | Refills: 0 | Status: CANCELLED | OUTPATIENT
Start: 2019-01-28

## 2019-01-28 RX ORDER — PREDNISONE 10 MG/1
10 TABLET ORAL DAILY
Qty: 100 TABLET | Refills: 1 | Status: SHIPPED | OUTPATIENT
Start: 2019-01-28 | End: 2019-02-02

## 2019-01-28 ASSESSMENT — PAIN SCALES - GENERAL: PAINLEVEL: NO PAIN (0)

## 2019-01-28 ASSESSMENT — MIFFLIN-ST. JEOR: SCORE: 1954.16

## 2019-01-28 NOTE — LETTER
2019     RE: Jose Spears  [id number] [address] [address]     Dear Colleague,    Thank you for referring your patient, Jose Spears, to the [facility] at [facility]. Please see a copy of my visit note below.    [facility]  Rheumatology Clinic  MD Sea Klineagudo Rheumatology Fellow  2019     Name: Jose Spears  MRN: [id number]  Age: 33 year old  : 1985     Assessment and Plan:  33 year old male who initially presented with fever, myalgias, salmon colored rash, ferritin of 34k. Diagnosed with AOSD who was started on prednisone 60mg of prednisone on 1/3/19 and tapered by 10mg weekly to today's appointment. Currently on 30mg. Fatigue is biggest complaint and seems to be persistent despite prednisone dosing. This is unfortunately a common lingering effect of AOSD and can be the most difficult to resolve. Prednisone may be both helping with his stills and causing his weakness and fatigue. Will slow his taper at this time and anticipate that he will need a steroid sparing agent, anakinra.     Plan at this time:  Stay on 30mg for 2 weeks  Decrease to 20mg for 2 weeks  Decrease to 15mg an stay on this dose until see again in clinic     Provided him with a work note.    At next appointment will likely start anakinra. Reviewing his chart, it has been ordered and authorized.     Patient seen and discussed with attending, Dr. Conte.    I saw the patient with the fellow.  My exam and recomendations are as described. His Anakinra has been approved, and we have had him start this, as this should be definitive therapy, and should allow the rapid taper of prednisone.      Rome Conte MD    HPI:   Jose Spears is a 33 year old male with a history of stills who presents for evaluation of Adult-onset Still's Disease.    Was admitted between 2018 - 2018 for persistent fever,  myalgias, and maculopapular rash. He presented at the emergency department on 1/2/2019 as he was experiencing continuation of fevers, weakness, and rash. Associated with his fevers and rash was severe chills and rigors. Prior to admission, he was most notably found to have a high ferritin over 20,000 which indicated likely Adult Onset Still's Disease. He was given a prednisone taper as treatment 60mg a day down 10mg each week (60->50->40->30->20->10mg).     Since his discharge, he tried to go back to work but was only able to sit behind the desk for half the day due to significant fatigue. He called the clinic at that time to request a work note and spoke to one of the nursing staff. She asked how he was doing and he was still having symptoms that he reported were worse. He was told to go back up to 60mg of prednisone at that time. After the phone call he did not think that his symptoms were significant enough to increase the dose so he decided to continue on with the original taper plan.    When speaking with him today, his biggest complaint is his fatigue. He is sleeping 10-11 hours per night but still cannot get enough rest. He has been unable to work and is requesting another work note to excuse him through the end of his prednisone taper. He also reports a question of hives on upper extremities during the night. They are not pruritic. Has only been going on for 2-3 nights. In the middle of the night can turn after being awake and can then breath heavily for 5-6 breaths and then self-resolves. Myalgias in thighs and calves bilaterally.  Not associated with any increased activity. Has proximal muscle weakness. Has not noticed any swelling or erythema. Has had ache in hands and feet by the end of the day regardless of level of activity. Does report stiffess in the AM that is worse in his hands compared with feet. Over the last day, has had pruritis on his cheeks. Feels they are warm.    His original salmon colored  rash was predominantly on his upper thighs bilaterally. Those lesions have completely resolved.    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 Could not do what he needed to do when he went there for the half day.    Fevers and chills are gone, but he is extremely cold.    Review of Systems:   Pertinent items are noted in HPI or as below, remainder of complete ROS is negative.      No recent problems with hearing or vision. No swallowing problems.   No breathing difficulty, shortness of breath, coughing, or wheezing  No chest pain or palpitations  No heart burn, indigestion, abdominal pain, nausea, vomiting, diarrhea  No urination problems, no bloody, cloudy urine, no dysuria  No numbing, tingling, weakness  No headaches or confusion  No rashes. No easy bleeding or bruising.     Active Medications:     Current Outpatient Medications:      acetaminophen (TYLENOL) 325 MG tablet, Take 2 tablets (650 mg) by mouth every 4 hours as needed for mild pain, Disp: , Rfl:      predniSONE (DELTASONE) 10 MG tablet, Take 6 tablets once a day for one week (through Jan 9), 5 tablets for one week (until Jan 16), 4 tablets for one week (until Jan 23), 3 tablets for one week (until Jan 30), 2 tablets for one week (until Feb 6), 1 tablet for one week (until Feb 13). Avoid NSAID type meds (ibuprofen) while taking.., Disp: 150 tablet, Rfl: 0      Allergies:   Codeine and Morphine      Past Medical History:  Osteogenesis imperfecta  Tobacco abuse   External hemorrhoids   Weakness   Fever and chills     Past Surgical History:  Orthopedic surgery    Family History:   Genetic Disorder Father        OI   Diabetes Maternal Grandmother    Hypertension Maternal Grandmother    Cerebrovascular Disease Maternal Grandmother    Diabetes Maternal Aunt    Cancer - colorectal Maternal Grandfather       Social History:   Patient does not smoke and uses alcohol occasionally     Physical Exam:   There  were no vitals taken for this visit.   Wt Readings from Last 4 Encounters:   01/04/19 97.2 kg (214 lb 4.8 oz)   12/31/18 102.1 kg (225 lb)   12/23/18 102.1 kg (225 lb)   12/22/18 102.1 kg (225 lb)     Constitutional: Well-developed, appearing stated age; cooperative  Eyes: Normal EOM, PERRLA, vision, conjunctiva, sclera  ENT: Normal external ears, nose, hearing, lips, teeth, gums, throat. No mucous membrane lesions, normal saliva pool  Neck: No mass or thyroid enlargement  Resp: Lungs clear to auscultation, nl to palpation  CV: RRR, no murmurs, rubs or gallops, no edema  GI: No ABD mass or tenderness, no HSM  : Not tested  Lymph: No cervical, supraclavicular, inguinal or epitrochlear nodes  MS: The TMJ, neck, shoulder, elbow, wrist, MCP/PIP/DIP, spine, hip, knee, ankle, and foot MTP/IP joints were examined and found normal. No active synovitis or altered joint anatomy. Full joint ROM. Normal  strength. No dactylitis,  tenosynovitis, enthesopathy.  Skin: No nail pitting, alopecia, rash, nodules or lesions  Neuro: Normal cranial nerves, strength, sensation, DTRs.   Psych: Normal judgement, orientation, memory, affect.     Labs:  ESR 34  CRP 54.4  Ferritin 7401  CBC WBC 13.8 Hgb 12.8  LFTs: ALT 62 AST 35    Imaging:  No interval imaging to review    Again, thank you for allowing me to participate in the care of your patient.      Sincerely,    Rome Conte MD

## 2019-01-28 NOTE — PATIENT INSTRUCTIONS
Stay on 30mg for 2 weeks  Decrease to 20mg for 2 weeks  Decrease to 15mg an stay on this dose until see again    Follow-up in

## 2019-01-28 NOTE — PROGRESS NOTES
[facility]  Rheumatology Clinic  Rome Conte MD  [facility] Rheumatology Fellow  2019     Name: Jose Spears  MRN: [id number]  Age: 33 year old  : 1985     Assessment and Plan:  33 year old male who initially presented with fever, myalgias, salmon colored rash, ferritin of 34k. Diagnosed with AOSD who was started on prednisone 60mg of prednisone on 1/3/19 and tapered by 10mg weekly to today's appointment. Currently on 30mg. Fatigue is biggest complaint and seems to be persistent despite prednisone dosing. This is unfortunately a common lingering effect of AOSD and can be the most difficult to resolve. Prednisone may be both helping with his stills and causing his weakness and fatigue. Will slow his taper at this time and anticipate that he will need a steroid sparing agent, anakinra.     Plan at this time:  Stay on 30mg for 2 weeks  Decrease to 20mg for 2 weeks  Decrease to 15mg an stay on this dose until see again in clinic     Provided him with a work note.    At next appointment will likely start anakinra. Reviewing his chart, it has been ordered and authorized.     Patient seen and discussed with attending, Dr. Conte.    I saw the patient with the fellow.  My exam and recomendations are as described. His Anakinra has been approved, and we have had him start this, as this should be definitive therapy, and should allow the rapid taper of prednisone.      Rome Conte MD    HPI:   Jose Spears is a 33 year old male with a history of stills who presents for evaluation of Adult-onset Still's Disease.    Was admitted between 2018 - 2018 for persistent fever, myalgias, and maculopapular rash. He presented at the emergency department on 2019 as he was experiencing continuation of fevers, weakness, and rash. Associated with his fevers and rash was severe chills and rigors. Prior to admission, he was most notably found to have a high ferritin over 20,000 which  indicated likely Adult Onset Still's Disease. He was given a prednisone taper as treatment 60mg a day down 10mg each week (60->50->40->30->20->10mg).     Since his discharge, he tried to go back to work but was only able to sit behind the desk for half the day due to significant fatigue. He called the clinic at that time to request a work note and spoke to one of the nursing staff. She asked how he was doing and he was still having symptoms that he reported were worse. He was told to go back up to 60mg of prednisone at that time. After the phone call he did not think that his symptoms were significant enough to increase the dose so he decided to continue on with the original taper plan.    When speaking with him today, his biggest complaint is his fatigue. He is sleeping 10-11 hours per night but still cannot get enough rest. He has been unable to work and is requesting another work note to excuse him through the end of his prednisone taper. He also reports a question of hives on upper extremities during the night. They are not pruritic. Has only been going on for 2-3 nights. In the middle of the night can turn after being awake and can then breath heavily for 5-6 breaths and then self-resolves. Myalgias in thighs and calves bilaterally.  Not associated with any increased activity. Has proximal muscle weakness. Has not noticed any swelling or erythema. Has had ache in hands and feet by the end of the day regardless of level of activity. Does report stiffess in the AM that is worse in his hands compared with feet. Over the last day, has had pruritis on his cheeks. Feels they are warm.    His original salmon colored rash was predominantly on his upper thighs bilaterally. Those lesions have completely resolved.    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 Could not do what he needed to do when he went there for the half day.    Fevers and chills are gone, but he  is extremely cold.    Review of Systems:   Pertinent items are noted in HPI or as below, remainder of complete ROS is negative.      No recent problems with hearing or vision. No swallowing problems.   No breathing difficulty, shortness of breath, coughing, or wheezing  No chest pain or palpitations  No heart burn, indigestion, abdominal pain, nausea, vomiting, diarrhea  No urination problems, no bloody, cloudy urine, no dysuria  No numbing, tingling, weakness  No headaches or confusion  No rashes. No easy bleeding or bruising.     Active Medications:     Current Outpatient Medications:      acetaminophen (TYLENOL) 325 MG tablet, Take 2 tablets (650 mg) by mouth every 4 hours as needed for mild pain, Disp: , Rfl:      predniSONE (DELTASONE) 10 MG tablet, Take 6 tablets once a day for one week (through Jan 9), 5 tablets for one week (until Jan 16), 4 tablets for one week (until Jan 23), 3 tablets for one week (until Jan 30), 2 tablets for one week (until Feb 6), 1 tablet for one week (until Feb 13). Avoid NSAID type meds (ibuprofen) while taking.., Disp: 150 tablet, Rfl: 0      Allergies:   Codeine and Morphine      Past Medical History:  Osteogenesis imperfecta  Tobacco abuse   External hemorrhoids   Weakness   Fever and chills     Past Surgical History:  Orthopedic surgery    Family History:   Genetic Disorder Father        OI   Diabetes Maternal Grandmother    Hypertension Maternal Grandmother    Cerebrovascular Disease Maternal Grandmother    Diabetes Maternal Aunt    Cancer - colorectal Maternal Grandfather       Social History:   Patient does not smoke and uses alcohol occasionally     Physical Exam:   There were no vitals taken for this visit.   Wt Readings from Last 4 Encounters:   01/04/19 97.2 kg (214 lb 4.8 oz)   12/31/18 102.1 kg (225 lb)   12/23/18 102.1 kg (225 lb)   12/22/18 102.1 kg (225 lb)     Constitutional: Well-developed, appearing stated age; cooperative  Eyes: Normal EOM, PERRLA, vision,  conjunctiva, sclera  ENT: Normal external ears, nose, hearing, lips, teeth, gums, throat. No mucous membrane lesions, normal saliva pool  Neck: No mass or thyroid enlargement  Resp: Lungs clear to auscultation, nl to palpation  CV: RRR, no murmurs, rubs or gallops, no edema  GI: No ABD mass or tenderness, no HSM  : Not tested  Lymph: No cervical, supraclavicular, inguinal or epitrochlear nodes  MS: The TMJ, neck, shoulder, elbow, wrist, MCP/PIP/DIP, spine, hip, knee, ankle, and foot MTP/IP joints were examined and found normal. No active synovitis or altered joint anatomy. Full joint ROM. Normal  strength. No dactylitis,  tenosynovitis, enthesopathy.  Skin: No nail pitting, alopecia, rash, nodules or lesions  Neuro: Normal cranial nerves, strength, sensation, DTRs.   Psych: Normal judgement, orientation, memory, affect.     Labs:  ESR 34  CRP 54.4  Ferritin 7401  CBC WBC 13.8 Hgb 12.8  LFTs: ALT 62 AST 35    Imaging:  No interval imaging to review

## 2019-01-29 ENCOUNTER — TELEPHONE (OUTPATIENT)
Dept: RHEUMATOLOGY | Facility: CLINIC | Age: 34
End: 2019-01-29

## 2019-01-29 NOTE — TELEPHONE ENCOUNTER
PA Initiation    Medication: KINERET  Insurance Company: [employer] - Phone [phone] Fax [phone]  Pharmacy Filling the Rx: [facility] - [place], [place] - [facility][note 3] [address]  Filling Pharmacy Phone:    Filling Pharmacy Fax:    Start Date: 1/29/2019

## 2019-01-30 ENCOUNTER — MEDICAL CORRESPONDENCE (OUTPATIENT)
Dept: HEALTH INFORMATION MANAGEMENT | Facility: CLINIC | Age: 34
End: 2019-01-30

## 2019-01-30 NOTE — TELEPHONE ENCOUNTER
Enrollment form and PAP paperwork faxed to [employer] for support programs via fax# [phone].    [email or web address]    [email or web address]

## 2019-01-31 NOTE — TELEPHONE ENCOUNTER
Prior Authorization Approval    Authorization Effective Date: 1/30/2019  Authorization Expiration Date: 1/30/2099  Medication: KINERET - Approved  Approved Dose/Quantity: 28 FOR 28 DAYS  Reference #: [id number]   Insurance Company: [employer] - Phone [phone] Fax [phone]  Expected CoPay: UNKNOWN      CoPay Card Available:      Foundation Assistance Needed:    Which Pharmacy is filling the prescription (Not needed for infusion/clinic administered): [facility] [place], [place] - [address]  Pharmacy Notified: Yes - rx released  Patient Notified: Yes - via pharmacy

## 2019-02-01 DIAGNOSIS — M08.20 STILL'S DISEASE (H): ICD-10-CM

## 2019-02-01 NOTE — TELEPHONE ENCOUNTER
Health Call Center    Phone Message    May a detailed message be left on voicemail: yes    Reason for Call: Medication Refill Request    Has the patient contacted the pharmacy for the refill? Yes   Name of medication being requested: predniSONE (DELTASONE) 10 MG tablet  Provider who prescribed the medication: Dr Douglas  Pharmacy: [facility] [address] [place] [address] [id number] AT [place] [address]  Date medication is needed: as soon as possible.  Jose is requesting that the Rx be sent to his pharmacy vs printed at the clinic.  Please call Jose with any questions or concerns         Action Taken: Message routed to:  [facility]

## 2019-02-02 RX ORDER — PREDNISONE 10 MG/1
10 TABLET ORAL DAILY
Qty: 100 TABLET | Refills: 1 | Status: SHIPPED | OUTPATIENT
Start: 2019-02-02 | End: 2019-02-04

## 2019-02-02 NOTE — TELEPHONE ENCOUNTER
The current plan for his prednisone is as follows to be started from the last appointment. Looking at the message from Dr. Conte, he is going to assess whether he is feeling significantly better on the anakinra such that we can taper his prednisone more quickly. The patient will let us know. If you could forward this to him to answer his question on the prednisone dose. Thanks.    Stay on 30mg for 2 weeks  Decrease to 20mg for 2 weeks  Decrease to 15mg an stay on this dose until see again in clinic

## 2019-02-02 NOTE — TELEPHONE ENCOUNTER
He should begin it immediately, and contact us if/when he is feeling enough better to taper prednisone again.

## 2019-02-04 ENCOUNTER — TELEPHONE (OUTPATIENT)
Dept: RHEUMATOLOGY | Facility: CLINIC | Age: 34
End: 2019-02-04

## 2019-02-04 DIAGNOSIS — M08.20 STILL'S DISEASE (H): ICD-10-CM

## 2019-02-04 RX ORDER — PREDNISONE 10 MG/1
TABLET ORAL
Qty: 100 TABLET | Refills: 1 | Status: SHIPPED | OUTPATIENT
Start: 2019-02-04 | End: 2019-02-05

## 2019-02-04 NOTE — TELEPHONE ENCOUNTER
Called patient to ask him if he received his Anakinra.  Jose said he was under the impression that he was not suppose to start this medication until his next office visit with Dr. Conte on 2/26/19.  And was to decrease the prednisone to 15mg.  Thursday 1/31/19 he tried decreasing to 20mg but then he started having symptoms again.  His hives are starting again and has had a fever last night and today of 101.0.  So then he increased his Prednisone 30mg today (2/4/19).      Jose states he seems to be doing better since his increase of Prednisone.   He only has enough Prednisone for one more day so he really wants to know what he should be doing?  Should he be taking Prednisone? Should he be on Anakinra?  He is really confused on what he should be doing at this time?     If he is to stay on Prednisone he really needs it to be refilled.     Will route to Dr. Conte.    Ni Magana MSN, RN  Rheumatology RN Care Coordinator  [facility]

## 2019-02-04 NOTE — TELEPHONE ENCOUNTER
[facility] Call [facility]    Phone Message    May a detailed message be left on voicemail: yes    Reason for Call: Other: PT:  Per pt he has been waiting for his Rx (predniSONE (DELTASONE) 10 MG tablet), pt states [facility] in [place] does not have his Rx. Please call pt to further assist with his questions on concerns regarding his meds thanks.     Action Taken: Message routed to:  [facility] R[facility]UM

## 2019-02-05 RX ORDER — PREDNISONE 10 MG/1
20 TABLET ORAL DAILY
Qty: 30 TABLET | Refills: 1 | Status: SHIPPED | OUTPATIENT
Start: 2019-02-05 | End: 2019-09-11

## 2019-02-05 NOTE — TELEPHONE ENCOUNTER
Received staff message from Dr. Conte:    Rome Conte MD McConniel, Sharon, CRUZ   Cc: Onur Llanos RN   Caller: Unspecified (Yesterday,  3:20 PM)             As I said on my 1/29 response, he should start the Anakinra immediately.     You should refill the prednisone @ 20 mg/day until he feels improved. I will countersign.      Prednisone 20mg set up and routed to Dr. Conte for completion.     Called patient and gave him the message that he should start the Anakinra immediately and the Prednisone 20mg will he sent to the pharmacy and he should continue taking until he feels he has improved.   Patient understands and will follow directions. He has not further questions.     Ni Magana MSN, RN  Rheumatology RN Care Coordinator  [facility]

## 2019-02-26 ENCOUNTER — OFFICE VISIT (OUTPATIENT)
Dept: RHEUMATOLOGY | Facility: CLINIC | Age: 34
End: 2019-02-26
Attending: INTERNAL MEDICINE
Payer: COMMERCIAL

## 2019-02-26 VITALS
HEIGHT: 71 IN | DIASTOLIC BLOOD PRESSURE: 75 MMHG | TEMPERATURE: 97.5 F | WEIGHT: 213 LBS | OXYGEN SATURATION: 100 % | BODY MASS INDEX: 29.82 KG/M2 | SYSTOLIC BLOOD PRESSURE: 115 MMHG | HEART RATE: 85 BPM

## 2019-02-26 DIAGNOSIS — Z51.81 MEDICATION MONITORING ENCOUNTER: ICD-10-CM

## 2019-02-26 DIAGNOSIS — M08.20 STILL'S DISEASE (H): Primary | ICD-10-CM

## 2019-02-26 DIAGNOSIS — M08.20 STILL'S DISEASE (H): ICD-10-CM

## 2019-02-26 LAB
ALBUMIN SERPL-MCNC: 3.8 G/DL (ref 3.4–5)
ALP SERPL-CCNC: 55 U/L (ref 40–150)
ALT SERPL W P-5'-P-CCNC: 45 U/L (ref 0–70)
ANION GAP SERPL CALCULATED.3IONS-SCNC: 6 MMOL/L (ref 3–14)
AST SERPL W P-5'-P-CCNC: 24 U/L (ref 0–45)
BASOPHILS # BLD AUTO: 0.1 10E9/L (ref 0–0.2)
BASOPHILS NFR BLD AUTO: 1.1 %
BILIRUB SERPL-MCNC: 0.7 MG/DL (ref 0.2–1.3)
BUN SERPL-MCNC: 12 MG/DL (ref 7–30)
CALCIUM SERPL-MCNC: 8.5 MG/DL (ref 8.5–10.1)
CHLORIDE SERPL-SCNC: 106 MMOL/L (ref 94–109)
CO2 SERPL-SCNC: 27 MMOL/L (ref 20–32)
CREAT SERPL-MCNC: 0.73 MG/DL (ref 0.66–1.25)
CRP SERPL-MCNC: <2.9 MG/L (ref 0–8)
DIFFERENTIAL METHOD BLD: ABNORMAL
EOSINOPHIL # BLD AUTO: 0.2 10E9/L (ref 0–0.7)
EOSINOPHIL NFR BLD AUTO: 2.5 %
ERYTHROCYTE [DISTWIDTH] IN BLOOD BY AUTOMATED COUNT: 15.9 % (ref 10–15)
ERYTHROCYTE [SEDIMENTATION RATE] IN BLOOD BY WESTERGREN METHOD: 3 MM/H (ref 0–15)
FERRITIN SERPL-MCNC: 357 NG/ML (ref 26–388)
GFR SERPL CREATININE-BSD FRML MDRD: >90 ML/MIN/{1.73_M2}
GLUCOSE SERPL-MCNC: 95 MG/DL (ref 70–99)
HCT VFR BLD AUTO: 46.8 % (ref 40–53)
HGB BLD-MCNC: 14.4 G/DL (ref 13.3–17.7)
IMM GRANULOCYTES # BLD: 0 10E9/L (ref 0–0.4)
IMM GRANULOCYTES NFR BLD: 0.3 %
LYMPHOCYTES # BLD AUTO: 1.6 10E9/L (ref 0.8–5.3)
LYMPHOCYTES NFR BLD AUTO: 21.1 %
MCH RBC QN AUTO: 26.5 PG (ref 26.5–33)
MCHC RBC AUTO-ENTMCNC: 30.8 G/DL (ref 31.5–36.5)
MCV RBC AUTO: 86 FL (ref 78–100)
MONOCYTES # BLD AUTO: 0.4 10E9/L (ref 0–1.3)
MONOCYTES NFR BLD AUTO: 5.1 %
NEUTROPHILS # BLD AUTO: 5.3 10E9/L (ref 1.6–8.3)
NEUTROPHILS NFR BLD AUTO: 69.9 %
NRBC # BLD AUTO: 0 10*3/UL
NRBC BLD AUTO-RTO: 0 /100
PLATELET # BLD AUTO: 248 10E9/L (ref 150–450)
POTASSIUM SERPL-SCNC: 3.6 MMOL/L (ref 3.4–5.3)
PROT SERPL-MCNC: 7 G/DL (ref 6.8–8.8)
RBC # BLD AUTO: 5.43 10E12/L (ref 4.4–5.9)
SODIUM SERPL-SCNC: 138 MMOL/L (ref 133–144)
WBC # BLD AUTO: 7.6 10E9/L (ref 4–11)

## 2019-02-26 PROCEDURE — 85025 COMPLETE CBC W/AUTO DIFF WBC: CPT | Performed by: INTERNAL MEDICINE

## 2019-02-26 PROCEDURE — 36415 COLL VENOUS BLD VENIPUNCTURE: CPT | Performed by: INTERNAL MEDICINE

## 2019-02-26 PROCEDURE — 80053 COMPREHEN METABOLIC PANEL: CPT | Performed by: INTERNAL MEDICINE

## 2019-02-26 PROCEDURE — G0463 HOSPITAL OUTPT CLINIC VISIT: HCPCS | Mod: ZF

## 2019-02-26 PROCEDURE — 85652 RBC SED RATE AUTOMATED: CPT | Performed by: INTERNAL MEDICINE

## 2019-02-26 PROCEDURE — 86140 C-REACTIVE PROTEIN: CPT | Performed by: INTERNAL MEDICINE

## 2019-02-26 PROCEDURE — 82728 ASSAY OF FERRITIN: CPT | Performed by: INTERNAL MEDICINE

## 2019-02-26 RX ORDER — PREDNISONE 5 MG/1
5 TABLET ORAL DAILY
Qty: 30 TABLET | Refills: 2 | Status: SHIPPED | OUTPATIENT
Start: 2019-02-26 | End: 2019-09-11

## 2019-02-26 ASSESSMENT — MIFFLIN-ST. JEOR: SCORE: 1938.29

## 2019-02-26 ASSESSMENT — PAIN SCALES - GENERAL: PAINLEVEL: NO PAIN (0)

## 2019-02-26 NOTE — LETTER
2/26/2019       RE: Jose Spears  [id number] [facility] [address]     Dear Colleague,    Thank you for referring your patient, Jose Spears, to the [facility] RHEUMATOLOGY at [facility]. Please see a copy of my visit note below.      Rheumatology Follow-up  Name: Jose Spears MRN [id number]   Sea Douglas MD Fellow  Rome Conte MD Staff Today's date: 2/26/2019         Assessment & Plan:   33 year old who initially presented with fever, myalgias, salmon colored rash, ferritin of 34K. He was diagnosed with AOSD and started on prednisone 60mg on 1/3/19. Unable to taper below 20mg without return of symptoms so was started on Anakinra 100mg subcutaneous daily on 2/5/19. Since that time has noticed significant improvement in all symptoms and is close to baseline even after further decreasing his prednisone to 10mg one week ago. At this time his acute inflammatory disease seems to be suppressed with anakinra.  Will continue to taper his prednisone. Will decrease to 5mg for one week, then further to 2.5mg for one week, then can stop. Will anticipate that he will be on anakinra for foreseeable future. Could discuss in a years time to have trial without to assess for return of symptoms. Will plan to see him back in 2 months, or sooner with new or progressive symptoms.      Plan:  -Continue anakinra 100mg subcutanous daily  -Decrease to 5mg prednisone daily for one week then decrease to 2.5mg daily for one week then stop (paper script given to him)  -Counseled re: infection risk and smoking cessation  -Follow-up in 2 months (CBC with diff, CRP, ferritin, LFTs ordered for this appointment. He will get labs done before).    Sea Douglas MD  Rheumatology Fellow  [phone]    Patient was seen and discussed with Dr. Conte who agrees with above assessment and plan.    I saw the patient with the fellow.  My exam and recomendations are as described.       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     Was admitted between 12/23/2018 - 12/29/2018 for persistent fever, myalgias, and maculopapular rash. He presented at the emergency department on 1/2/2019 as he was experiencing continuation of fevers, weakness, and rash. Associated with his fevers and rash was severe chills and rigors. Prior to admission, he was most notably found to have a high ferritin over 20,000 which indicated likely Adult Onset Still's Disease. He was given a prednisone taper as treatment 60mg a day down 10mg each week (60->50->40->30->20->10mg).      After his discharge he tried to go back to work but was only able to sit behind the desk for half the day due to significant fatigue. He called the clinic at that time to request a work note and spoke to one of the nursing staff. She asked how he was doing and he was still having symptoms that he reported were worse. He was told to go back up to 60mg of prednisone at that time. After the phone call he did not think that his symptoms were significant enough to increase the dose so he decided to continue on with the original taper plan.     He was seen last on 1/28/19.  His biggest complaint at that time was fatigue. Was sleeping 10-11 hours per night but still tired. He was unable to work and required documentation to be excused. He also reported a question of hives on upper extremities during the night.  Myalgias in thighs and calves bilaterally.  Had proximal muscle weakness. Had aches in hands and feet by the end of the day regardless of level of activity.  [place] colored rash had completely resolved. He was continued on prednisone 20mg daily. Anakinra was started on 2/5/19.    When speaking with him today, feels much improved. Improved strength. No longer as fatigued. Sleeping roughly 7-8 hours per night. Wakes feeling  "rested. Able to do work around the house, can go grocery shopping, driving. Did not have problems with any of these. No fevers/ chills. No return of skin lesions. In regards to prednisone, has minimal side effects to include jitters, insomnia, palpatations. Decreased his prednisone dose to 10mg one week ago.  No change in symptoms since decrease. Feels deconditioned and gets winded with walking up stairs, but otherwise strength is intact. Has been taking tylenol occasionally for headaches, which occur every 2-3 days. Joint pain in the mornings has completely resolved.     No fever, chills, night sweats, abdominal pain, nausea, vomiting, fatigue, rash.    Review of Systems:   Pertinent items are noted in HPI or as below, remainder of complete ROS is negative.       No recent problems with hearing or vision. No swallowing problems.   No breathing difficulty, shortness of breath, coughing, or wheezing  No chest pain or palpitations  No heart burn, indigestion, abdominal pain, nausea, vomiting, diarrhea  No urination problems, no bloody, cloudy urine, no dysuria  No numbing, tingling, weakness  No headaches or confusion  No rashes. No easy bleeding or bruising     Medications:  Prednisone 20mg daily  Anakinra 100mg subcutaneous daily  Tylenol prn     Objective:     Physical exam:  /75   Pulse 85   Temp 97.5  F (36.4  C) (Oral)   Ht 1.803 m (5' 11\")   Wt 96.6 kg (213 lb)   SpO2 100%   BMI 29.71 kg/m     General: NAD, comfortable  HEENT: EOMI, PERRL, no scleral icterus or injection, no bruits appreciated, no JVD, MMM  Cardiac: RRR, no m/r/g appreciated.   Respiratory: CTAB, no wheezes, rhonchi or crackles appreciated.  GI: NABS, NT/ND, no guarding or rebound  Extremities: No LE edema, pulses DP 2+, radial pulses 2+   Skin: No acute lesions appreciated  Neuro: AOx3, CN II-XII grossly intact, normal muscle power, normal sensory function    Labs:  2/26/19  CBC unremarkable  CMP unremarkable  Ferritin pending  CRP " <2.9  ESR 3    Imaging:  No interval imaging to review       Again, thank you for allowing me to participate in the care of your patient.      Sincerely,    Rome Conte MD

## 2019-02-26 NOTE — LETTER
Date:March 26, 2019      Patient was self referred, no letter generated. Do not send.        [facility] Health Information

## 2019-02-26 NOTE — NURSING NOTE
"Chief Complaint   Patient presents with     RECHECK     Still's disease     /75   Pulse 85   Temp 97.5  F (36.4  C) (Oral)   Ht 1.803 m (5' 11\")   Wt 96.6 kg (213 lb)   SpO2 100%   BMI 29.71 kg/m    Jenny Dwyer MA    "

## 2019-02-26 NOTE — LETTER
[facility] RHEUMATOLOGY  [address]  [address]  [place] [address] [phone]  Dept: [phone]      2/26/2019    Re: Jose Spears      TO WHOM IT MAY CONCERN:    Jose Spears was seen in our clinic today, February 26, 2019.  He may return to work within the next 30 days based on his assessment of his ability to do so. When he returns, he will should be limited to half days for up to two weeks. If he tolerates this, can increase to full days.    Please contact me for questions or concerns.    Sincerely,    Sea Douglas MD  Rheumatology Fellow  [phone]

## 2019-02-26 NOTE — LETTER
Date:March 26, 2019      Patient was self referred, no letter generated. Do not send.        [facility] Health Information

## 2019-02-26 NOTE — LETTER
2/26/2019      RE: Jose Spears  [address] [facility] [address]         Rheumatology Follow-up  Name: Jose Spears MRN [id number]   Sea Douglas MD Fellow  Rome Conte MD Staff Today's date: 2/26/2019         Assessment & Plan:   33 year old who initially presented with fever, myalgias, salmon colored rash, ferritin of 34K. He was diagnosed with AOSD and started on prednisone 60mg on 1/3/19. Unable to taper below 20mg without return of symptoms so was started on Anakinra 100mg subcutaneous daily on 2/5/19. Since that time has noticed significant improvement in all symptoms and is close to baseline even after further decreasing his prednisone to 10mg one week ago. At this time his acute inflammatory disease seems to be suppressed with anakinra.  Will continue to taper his prednisone. Will decrease to 5mg for one week, then further to 2.5mg for one week, then can stop. Will anticipate that he will be on anakinra for foreseeable future. Could discuss in a years time to have trial without to assess for return of symptoms. Will plan to see him back in 2 months, or sooner with new or progressive symptoms.      Plan:  -Continue anakinra 100mg subcutanous daily  -Decrease to 5mg prednisone daily for one week then decrease to 2.5mg daily for one week then stop (paper script given to him)  -Counseled re: infection risk and smoking cessation  -Follow-up in 2 months (CBC with diff, CRP, ferritin, LFTs ordered for this appointment. He will get labs done before).    Sea Douglas MD  Rheumatology Fellow  [phone]    Patient was seen and discussed with Dr. Conte who agrees with above assessment and plan.    I saw the patient with the fellow.  My exam and recomendations are as described.      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     Was admitted between 12/23/2018  - 12/29/2018 for persistent fever, myalgias, and maculopapular rash. He presented at the emergency department on 1/2/2019 as he was experiencing continuation of fevers, weakness, and rash. Associated with his fevers and rash was severe chills and rigors. Prior to admission, he was most notably found to have a high ferritin over 20,000 which indicated likely Adult Onset Still's Disease. He was given a prednisone taper as treatment 60mg a day down 10mg each week (60->50->40->30->20->10mg).      After his discharge he tried to go back to work but was only able to sit behind the desk for half the day due to significant fatigue. He called the clinic at that time to request a work note and spoke to one of the nursing staff. She asked how he was doing and he was still having symptoms that he reported were worse. He was told to go back up to 60mg of prednisone at that time. After the phone call he did not think that his symptoms were significant enough to increase the dose so he decided to continue on with the original taper plan.     He was seen last on 1/28/19.  His biggest complaint at that time was fatigue. Was sleeping 10-11 hours per night but still tired. He was unable to work and required documentation to be excused. He also reported a question of hives on upper extremities during the night.  Myalgias in thighs and calves bilaterally.  Had proximal muscle weakness. Had aches in hands and feet by the end of the day regardless of level of activity.  [place] colored rash had completely resolved. He was continued on prednisone 20mg daily. Anakinra was started on 2/5/19.    When speaking with him today, feels much improved. Improved strength. No longer as fatigued. Sleeping roughly 7-8 hours per night. Wakes feeling rested. Able to do work around the house, can go grocery shopping, driving. Did not have problems with any of these. No fevers/ chills. No return of skin lesions. In regards to prednisone, has minimal side  "effects to include jitters, insomnia, palpatations. Decreased his prednisone dose to 10mg one week ago.  No change in symptoms since decrease. Feels deconditioned and gets winded with walking up stairs, but otherwise strength is intact. Has been taking tylenol occasionally for headaches, which occur every 2-3 days. Joint pain in the mornings has completely resolved.     No fever, chills, night sweats, abdominal pain, nausea, vomiting, fatigue, rash.    Review of Systems:   Pertinent items are noted in HPI or as below, remainder of complete ROS is negative.       No recent problems with hearing or vision. No swallowing problems.   No breathing difficulty, shortness of breath, coughing, or wheezing  No chest pain or palpitations  No heart burn, indigestion, abdominal pain, nausea, vomiting, diarrhea  No urination problems, no bloody, cloudy urine, no dysuria  No numbing, tingling, weakness  No headaches or confusion  No rashes. No easy bleeding or bruising     Medications:  Prednisone 20mg daily  Anakinra 100mg subcutaneous daily  Tylenol prn     Objective:     Physical exam:  /75   Pulse 85   Temp 97.5  F (36.4  C) (Oral)   Ht 1.803 m (5' 11\")   Wt 96.6 kg (213 lb)   SpO2 100%   BMI 29.71 kg/m     General: NAD, comfortable  HEENT: EOMI, PERRL, no scleral icterus or injection, no bruits appreciated, no JVD, MMM  Cardiac: RRR, no m/r/g appreciated.   Respiratory: CTAB, no wheezes, rhonchi or crackles appreciated.  GI: NABS, NT/ND, no guarding or rebound  Extremities: No LE edema, pulses DP 2+, radial pulses 2+   Skin: No acute lesions appreciated  Neuro: AOx3, CN II-XII grossly intact, normal muscle power, normal sensory function    Labs:  2/26/19  CBC unremarkable  CMP unremarkable  Ferritin pending  CRP <2.9  ESR 3    Imaging:  No interval imaging to review       Rome Conte MD      "

## 2019-02-26 NOTE — PROGRESS NOTES
Rheumatology Follow-up  Name: Jose Spears MRN [id number]   Sea Douglas MD Fellow  Rome Conte MD Staff Today's date: 2/26/2019         Assessment & Plan:   33 year old who initially presented with fever, myalgias, salmon colored rash, ferritin of 34K. He was diagnosed with AOSD and started on prednisone 60mg on 1/3/19. Unable to taper below 20mg without return of symptoms so was started on Anakinra 100mg subcutaneous daily on 2/5/19. Since that time has noticed significant improvement in all symptoms and is close to baseline even after further decreasing his prednisone to 10mg one week ago. At this time his acute inflammatory disease seems to be suppressed with anakinra.  Will continue to taper his prednisone. Will decrease to 5mg for one week, then further to 2.5mg for one week, then can stop. Will anticipate that he will be on anakinra for foreseeable future. Could discuss in a years time to have trial without to assess for return of symptoms. Will plan to see him back in 2 months, or sooner with new or progressive symptoms.      Plan:  -Continue anakinra 100mg subcutanous daily  -Decrease to 5mg prednisone daily for one week then decrease to 2.5mg daily for one week then stop (paper script given to him)  -Counseled re: infection risk and smoking cessation  -Follow-up in 2 months (CBC with diff, CRP, ferritin, LFTs ordered for this appointment. He will get labs done before).    Sea Douglas MD  Rheumatology Fellow  [phone]    Patient was seen and discussed with Dr. Conte who agrees with above assessment and plan.    I saw the patient with the fellow.  My exam and recomendations are as described.      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     Was admitted between 12/23/2018 - 12/29/2018 for persistent fever, myalgias, and maculopapular rash. He presented at  the emergency department on 1/2/2019 as he was experiencing continuation of fevers, weakness, and rash. Associated with his fevers and rash was severe chills and rigors. Prior to admission, he was most notably found to have a high ferritin over 20,000 which indicated likely Adult Onset Still's Disease. He was given a prednisone taper as treatment 60mg a day down 10mg each week (60->50->40->30->20->10mg).      After his discharge he tried to go back to work but was only able to sit behind the desk for half the day due to significant fatigue. He called the clinic at that time to request a work note and spoke to one of the nursing staff. She asked how he was doing and he was still having symptoms that he reported were worse. He was told to go back up to 60mg of prednisone at that time. After the phone call he did not think that his symptoms were significant enough to increase the dose so he decided to continue on with the original taper plan.     He was seen last on 1/28/19.  His biggest complaint at that time was fatigue. Was sleeping 10-11 hours per night but still tired. He was unable to work and required documentation to be excused. He also reported a question of hives on upper extremities during the night.  Myalgias in thighs and calves bilaterally. Had proximal muscle weakness. Had aches in hands and feet by the end of the day regardless of level of activity. [place] colored rash had completely resolved. He was continued on prednisone 20mg daily. Anakinra was started on 2/5/19.    When speaking with him today, feels much improved. Improved strength. No longer as fatigued. Sleeping roughly 7-8 hours per night. Wakes feeling rested. Able to do work around the house, can go grocery shopping, driving. Did not have problems with any of these. No fevers/ chills. No return of skin lesions. In regards to prednisone, has minimal side effects to include jitters, insomnia, palpatations. Decreased his prednisone dose to 10mg  "one week ago.  No change in symptoms since decrease. Feels deconditioned and gets winded with walking up stairs, but otherwise strength is intact. Has been taking tylenol occasionally for headaches, which occur every 2-3 days. Joint pain in the mornings has completely resolved.     No fever, chills, night sweats, abdominal pain, nausea, vomiting, fatigue, rash.    Review of Systems:   Pertinent items are noted in HPI or as below, remainder of complete ROS is negative.       No recent problems with hearing or vision. No swallowing problems.   No breathing difficulty, shortness of breath, coughing, or wheezing  No chest pain or palpitations  No heart burn, indigestion, abdominal pain, nausea, vomiting, diarrhea  No urination problems, no bloody, cloudy urine, no dysuria  No numbing, tingling, weakness  No headaches or confusion  No rashes. No easy bleeding or bruising     Medications:  Prednisone 20mg daily  Anakinra 100mg subcutaneous daily  Tylenol prn     Objective:     Physical exam:  /75   Pulse 85   Temp 97.5  F (36.4  C) (Oral)   Ht 1.803 m (5' 11\")   Wt 96.6 kg (213 lb)   SpO2 100%   BMI 29.71 kg/m    General: NAD, comfortable  HEENT: EOMI, PERRL, no scleral icterus or injection, no bruits appreciated, no JVD, MMM  Cardiac: RRR, no m/r/g appreciated.   Respiratory: CTAB, no wheezes, rhonchi or crackles appreciated.  GI: NABS, NT/ND, no guarding or rebound  Extremities: No LE edema, pulses DP 2+, radial pulses 2+   Skin: No acute lesions appreciated  Neuro: AOx3, CN II-XII grossly intact, normal muscle power, normal sensory function    Labs:  2/26/19  CBC unremarkable  CMP unremarkable  Ferritin pending  CRP <2.9  ESR 3    Imaging:  No interval imaging to review       "

## 2019-04-23 ENCOUNTER — OFFICE VISIT (OUTPATIENT)
Dept: RHEUMATOLOGY | Facility: CLINIC | Age: 34
End: 2019-04-23
Attending: INTERNAL MEDICINE
Payer: COMMERCIAL

## 2019-04-23 VITALS
SYSTOLIC BLOOD PRESSURE: 129 MMHG | WEIGHT: 219.9 LBS | OXYGEN SATURATION: 95 % | HEART RATE: 64 BPM | BODY MASS INDEX: 30.78 KG/M2 | DIASTOLIC BLOOD PRESSURE: 80 MMHG | HEIGHT: 71 IN | TEMPERATURE: 98 F

## 2019-04-23 DIAGNOSIS — M08.20 STILL'S DISEASE (H): Primary | ICD-10-CM

## 2019-04-23 DIAGNOSIS — M06.1 ADULT STILL'S DISEASE (H): ICD-10-CM

## 2019-04-23 DIAGNOSIS — R21 RASH OF FACE: ICD-10-CM

## 2019-04-23 DIAGNOSIS — M08.20 STILL'S DISEASE (H): ICD-10-CM

## 2019-04-23 DIAGNOSIS — Z51.81 MEDICATION MONITORING ENCOUNTER: ICD-10-CM

## 2019-04-23 LAB
ALBUMIN SERPL-MCNC: 4.4 G/DL (ref 3.4–5)
ALP SERPL-CCNC: 52 U/L (ref 40–150)
ALT SERPL W P-5'-P-CCNC: 46 U/L (ref 0–70)
AST SERPL W P-5'-P-CCNC: 22 U/L (ref 0–45)
BASOPHILS # BLD AUTO: 0.1 10E9/L (ref 0–0.2)
BASOPHILS NFR BLD AUTO: 1.2 %
BILIRUB DIRECT SERPL-MCNC: 0.1 MG/DL (ref 0–0.2)
BILIRUB SERPL-MCNC: 0.5 MG/DL (ref 0.2–1.3)
CRP SERPL-MCNC: <2.9 MG/L (ref 0–8)
DIFFERENTIAL METHOD BLD: ABNORMAL
EOSINOPHIL # BLD AUTO: 0.3 10E9/L (ref 0–0.7)
EOSINOPHIL NFR BLD AUTO: 4.5 %
ERYTHROCYTE [DISTWIDTH] IN BLOOD BY AUTOMATED COUNT: 14.2 % (ref 10–15)
FERRITIN SERPL-MCNC: 52 NG/ML (ref 26–388)
HCT VFR BLD AUTO: 49.5 % (ref 40–53)
HGB BLD-MCNC: 16.4 G/DL (ref 13.3–17.7)
IMM GRANULOCYTES # BLD: 0 10E9/L (ref 0–0.4)
IMM GRANULOCYTES NFR BLD: 0.3 %
LYMPHOCYTES # BLD AUTO: 2.6 10E9/L (ref 0.8–5.3)
LYMPHOCYTES NFR BLD AUTO: 40.4 %
MCH RBC QN AUTO: 27.6 PG (ref 26.5–33)
MCHC RBC AUTO-ENTMCNC: 33.1 G/DL (ref 31.5–36.5)
MCV RBC AUTO: 83 FL (ref 78–100)
MONOCYTES # BLD AUTO: 0.5 10E9/L (ref 0–1.3)
MONOCYTES NFR BLD AUTO: 6.9 %
NEUTROPHILS # BLD AUTO: 3 10E9/L (ref 1.6–8.3)
NEUTROPHILS NFR BLD AUTO: 46.7 %
NRBC # BLD AUTO: 0 10*3/UL
NRBC BLD AUTO-RTO: 0 /100
PLATELET # BLD AUTO: 249 10E9/L (ref 150–450)
PROT SERPL-MCNC: 7.3 G/DL (ref 6.8–8.8)
RBC # BLD AUTO: 5.94 10E12/L (ref 4.4–5.9)
WBC # BLD AUTO: 6.5 10E9/L (ref 4–11)

## 2019-04-23 PROCEDURE — 85025 COMPLETE CBC W/AUTO DIFF WBC: CPT | Performed by: INTERNAL MEDICINE

## 2019-04-23 PROCEDURE — 36415 COLL VENOUS BLD VENIPUNCTURE: CPT | Performed by: INTERNAL MEDICINE

## 2019-04-23 PROCEDURE — 80076 HEPATIC FUNCTION PANEL: CPT | Performed by: INTERNAL MEDICINE

## 2019-04-23 PROCEDURE — 86140 C-REACTIVE PROTEIN: CPT | Performed by: INTERNAL MEDICINE

## 2019-04-23 PROCEDURE — G0463 HOSPITAL OUTPT CLINIC VISIT: HCPCS | Mod: ZF

## 2019-04-23 PROCEDURE — 82728 ASSAY OF FERRITIN: CPT | Performed by: INTERNAL MEDICINE

## 2019-04-23 ASSESSMENT — PAIN SCALES - GENERAL: PAINLEVEL: NO PAIN (0)

## 2019-04-23 ASSESSMENT — MIFFLIN-ST. JEOR: SCORE: 1969.59

## 2019-04-23 NOTE — LETTER
4/23/2019      RE: Jose Spears  [address] [facility] [address]         Rheumatology Follow-up  Name: Jose Spears MRN [id number]   Rome Conte MD Staff          Assessment & Plan:   33 year old who initially presented with fever, myalgias, salmon colored rash, ferritin of 34K. He was diagnosed with AOSD and started on prednisone 60mg on 1/3/19. Unable to taper below 20mg without return of symptoms so was started on Anakinra 100mg subcutaneous daily on 2/5/19. Since that time has noticed significant improvement in all symptoms and is close to baseline even after further decreasing his prednisone to 10mg one week ago. At this time his acute inflammatory disease seems to be suppressed with anakinra.  Will continue to taper his prednisone. Will decrease to 5mg for one week, then further to 2.5mg for one week, then can stop. Will anticipate that he will be on anakinra for foreseeable future. Could discuss in a years time to have trial without to assess for return of symptoms. Will plan to see him back in 2 months, or sooner with new or progressive symptoms.      Plan:  -Continue anakinra 100mg subcutanous daily  -Counseled re: infection risk and smoking cessation  -Derm appt. For possible Pittrosporum   -Follow-up in 4 months (CBC with diff, CRP, ferritin, LFTs ordered for this appointment. He will get labs done before).               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     Was admitted between 12/23/2018 - 12/29/2018 for persistent fever, myalgias, and maculopapular rash. He presented at the emergency department on 1/2/2019 as he was experiencing continuation of fevers, weakness, and rash. Associated with his fevers and rash was severe chills and rigors. Prior to admission, he was most notably found to have a high ferritin over 20,000 which indicated likely Adult Onset Still's Disease. He  was given a prednisone taper as treatment 60mg a day down 10mg each week (60->50->40->30->20->10mg).      After his discharge he tried to go back to work but was only able to sit behind the desk for half the day due to significant fatigue. He called the clinic at that time to request a work note and spoke to one of the nursing staff. She asked how he was doing and he was still having symptoms that he reported were worse. He was told to go back up to 60mg of prednisone at that time. After the phone call he did not think that his symptoms were significant enough to increase the dose so he decided to continue on with the original taper plan.     He was seen last on 1/28/19.  His biggest complaint at that time was fatigue. Was sleeping 10-11 hours per night but still tired. He was unable to work and required documentation to be excused. He also reported a question of hives on upper extremities during the night.  Myalgias in thighs and calves bilaterally. Had proximal muscle weakness. Had aches in hands and feet by the end of the day regardless of level of activity. [place] colored rash had completely resolved. He was continued on prednisone 20mg daily. Anakinra was started on 2/5/19.    When speaking with him today, feels much improved. Improved strength. No longer as fatigued. Sleeping roughly 7-8 hours per night. Wakes feeling rested. Able to do work around the house, can go grocery shopping, driving. Did not have problems with any of these. No fevers/ chills. No return of skin lesions. In regards to prednisone, has minimal side effects to include jitters, insomnia, palpatations. Decreased his prednisone dose to 10mg one week ago.  No change in symptoms since decrease. Feels deconditioned and gets winded with walking up stairs, but otherwise strength is intact. Has been taking tylenol occasionally for headaches, which occur every 2-3 days. Joint pain in the mornings has completely resolved.     April 23,2019 Interval  "History;    Since we last saw him he feels nearly \"normal\" with minimal symptoms.     Has some pain with the injections, but tolerable.     Missed one injection and had rash redevelop next day.     Has noted a low-grade rash over his face for a few weeks now.     No fever, chills, night sweats, abdominal pain, nausea, vomiting, fatigue, rash.    Review of Systems:   Pertinent items are noted in HPI or as below, remainder of complete ROS is negative.       No recent problems with hearing or vision. No swallowing problems.   No breathing difficulty, shortness of breath, coughing, or wheezing  No chest pain or palpitations  No heart burn, indigestion, abdominal pain, nausea, vomiting, diarrhea  No urination problems, no bloody, cloudy urine, no dysuria  No numbing, tingling, weakness  No headaches or confusion  No rashes. No easy bleeding or bruising     Medications:  Prednisone 20mg daily  Anakinra 100mg subcutaneous daily  Tylenol prn     Objective:     Physical exam:  /80   Pulse 64   Temp 98  F (36.7  C) (Oral)   Ht 1.803 m (5' 11\")   Wt 99.7 kg (219 lb 14.4 oz)   SpO2 95%   BMI 30.67 kg/m     General: NAD, comfortable  HEENT: EOMI, PERRL, no scleral icterus or injection, no bruits appreciated, no JVD, MMM  Cardiac: RRR, no m/r/g appreciated.   Respiratory: CTAB, no wheezes, rhonchi or crackles appreciated.  GI: NABS, NT/ND, no guarding or rebound  Extremities: No LE edema, pulses DP 2+, radial pulses 2+   Skin: His face shows low-grade erythema, slightly raised rashes. No acute lesions appreciated.   Neuro: AOx3, CN II-XII grossly intact, normal muscle power, normal sensory function    Labs:  2/26/19  CBC unremarkable  CMP unremarkable  Ferritin pending  CRP <2.9  ESR 3    Imaging:  No interval imaging to review       Rome Conte MD      "

## 2019-04-23 NOTE — NURSING NOTE
Chief Complaint   Patient presents with     RECHECK     Osteogenesis imperfecta     Sameer Frank MA

## 2019-04-23 NOTE — LETTER
Date:May 20, 2019      Patient was self referred, no letter generated. Do not send.        [facility] Information

## 2019-05-19 NOTE — PROGRESS NOTES
Rheumatology Follow-up  Name: Jose Spears MRN [id number]   Rome Conte MD Staff          Assessment & Plan:   33 year old who initially presented with fever, myalgias, salmon colored rash, ferritin of 34K. He was diagnosed with AOSD and started on prednisone 60mg on 1/3/19. Unable to taper below 20mg without return of symptoms so was started on Anakinra 100mg subcutaneous daily on 2/5/19. Since that time has noticed significant improvement in all symptoms and is close to baseline even after further decreasing his prednisone to 10mg one week ago. At this time his acute inflammatory disease seems to be suppressed with anakinra.  Will continue to taper his prednisone. Will decrease to 5mg for one week, then further to 2.5mg for one week, then can stop. Will anticipate that he will be on anakinra for foreseeable future. Could discuss in a years time to have trial without to assess for return of symptoms. Will plan to see him back in 2 months, or sooner with new or progressive symptoms.      Plan:  -Continue anakinra 100mg subcutanous daily  -Counseled re: infection risk and smoking cessation  -Derm appt. For possible Pittrosporum   -Follow-up in 4 months (CBC with diff, CRP, ferritin, LFTs ordered for this appointment. He will get labs done before).               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     Was admitted between 12/23/2018 - 12/29/2018 for persistent fever, myalgias, and maculopapular rash. He presented at the emergency department on 1/2/2019 as he was experiencing continuation of fevers, weakness, and rash. Associated with his fevers and rash was severe chills and rigors. Prior to admission, he was most notably found to have a high ferritin over 20,000 which indicated likely Adult Onset Still's Disease. He was given a prednisone taper as treatment 60mg a day down 10mg each week  "(60->50->40->30->20->10mg).      After his discharge he tried to go back to work but was only able to sit behind the desk for half the day due to significant fatigue. He called the clinic at that time to request a work note and spoke to one of the nursing staff. She asked how he was doing and he was still having symptoms that he reported were worse. He was told to go back up to 60mg of prednisone at that time. After the phone call he did not think that his symptoms were significant enough to increase the dose so he decided to continue on with the original taper plan.     He was seen last on 1/28/19.  His biggest complaint at that time was fatigue. Was sleeping 10-11 hours per night but still tired. He was unable to work and required documentation to be excused. He also reported a question of hives on upper extremities during the night.  Myalgias in thighs and calves bilaterally. Had proximal muscle weakness. Had aches in hands and feet by the end of the day regardless of level of activity. [place] colored rash had completely resolved. He was continued on prednisone 20mg daily. Anakinra was started on 2/5/19.    When speaking with him today, feels much improved. Improved strength. No longer as fatigued. Sleeping roughly 7-8 hours per night. Wakes feeling rested. Able to do work around the house, can go grocery shopping, driving. Did not have problems with any of these. No fevers/ chills. No return of skin lesions. In regards to prednisone, has minimal side effects to include jitters, insomnia, palpatations. Decreased his prednisone dose to 10mg one week ago.  No change in symptoms since decrease. Feels deconditioned and gets winded with walking up stairs, but otherwise strength is intact. Has been taking tylenol occasionally for headaches, which occur every 2-3 days. Joint pain in the mornings has completely resolved.     April 23,2019 Interval History;    Since we last saw him he feels nearly \"normal\" with minimal " "symptoms.     Has some pain with the injections, but tolerable.     Missed one injection and had rash redevelop next day.     Has noted a low-grade rash over his face for a few weeks now.     No fever, chills, night sweats, abdominal pain, nausea, vomiting, fatigue, rash.    Review of Systems:   Pertinent items are noted in HPI or as below, remainder of complete ROS is negative.       No recent problems with hearing or vision. No swallowing problems.   No breathing difficulty, shortness of breath, coughing, or wheezing  No chest pain or palpitations  No heart burn, indigestion, abdominal pain, nausea, vomiting, diarrhea  No urination problems, no bloody, cloudy urine, no dysuria  No numbing, tingling, weakness  No headaches or confusion  No rashes. No easy bleeding or bruising     Medications:  Prednisone 20mg daily  Anakinra 100mg subcutaneous daily  Tylenol prn     Objective:     Physical exam:  /80   Pulse 64   Temp 98  F (36.7  C) (Oral)   Ht 1.803 m (5' 11\")   Wt 99.7 kg (219 lb 14.4 oz)   SpO2 95%   BMI 30.67 kg/m    General: NAD, comfortable  HEENT: EOMI, PERRL, no scleral icterus or injection, no bruits appreciated, no JVD, MMM  Cardiac: RRR, no m/r/g appreciated.   Respiratory: CTAB, no wheezes, rhonchi or crackles appreciated.  GI: NABS, NT/ND, no guarding or rebound  Extremities: No LE edema, pulses DP 2+, radial pulses 2+   Skin: His face shows low-grade erythema, slightly raised rashes. No acute lesions appreciated.   Neuro: AOx3, CN II-XII grossly intact, normal muscle power, normal sensory function    Labs:  2/26/19  CBC unremarkable  CMP unremarkable  Ferritin pending  CRP <2.9  ESR 3    Imaging:  No interval imaging to review       "

## 2019-06-01 ENCOUNTER — TELEPHONE (OUTPATIENT)
Dept: RHEUMATOLOGY | Facility: CLINIC | Age: 34
End: 2019-06-01

## 2019-06-01 DIAGNOSIS — M06.1 ADULT-ONSET STILL'S DISEASE (H): ICD-10-CM

## 2019-06-01 LAB
ALBUMIN SERPL-MCNC: 4.3 G/DL (ref 3.4–5)
ALP SERPL-CCNC: 64 U/L (ref 40–150)
ALT SERPL W P-5'-P-CCNC: 30 U/L (ref 0–70)
ANION GAP SERPL CALCULATED.3IONS-SCNC: 6 MMOL/L (ref 3–14)
AST SERPL W P-5'-P-CCNC: 17 U/L (ref 0–45)
BASOPHILS # BLD AUTO: 0 10E9/L (ref 0–0.2)
BASOPHILS NFR BLD AUTO: 0.5 %
BILIRUB SERPL-MCNC: 0.5 MG/DL (ref 0.2–1.3)
BUN SERPL-MCNC: 16 MG/DL (ref 7–30)
CALCIUM SERPL-MCNC: 9.1 MG/DL (ref 8.5–10.1)
CHLORIDE SERPL-SCNC: 106 MMOL/L (ref 94–109)
CO2 SERPL-SCNC: 28 MMOL/L (ref 20–32)
CREAT SERPL-MCNC: 1.03 MG/DL (ref 0.66–1.25)
CRP SERPL-MCNC: 3.4 MG/L (ref 0–8)
DIFFERENTIAL METHOD BLD: ABNORMAL
EOSINOPHIL # BLD AUTO: 0.1 10E9/L (ref 0–0.7)
EOSINOPHIL NFR BLD AUTO: 2.4 %
ERYTHROCYTE [DISTWIDTH] IN BLOOD BY AUTOMATED COUNT: 14.6 % (ref 10–15)
FERRITIN SERPL-MCNC: 62 NG/ML (ref 26–388)
GFR SERPL CREATININE-BSD FRML MDRD: >90 ML/MIN/{1.73_M2}
GLUCOSE SERPL-MCNC: 61 MG/DL (ref 70–99)
HCT VFR BLD AUTO: 50.4 % (ref 40–53)
HGB BLD-MCNC: 17.5 G/DL (ref 13.3–17.7)
LDH SERPL L TO P-CCNC: 156 U/L (ref 85–227)
LYMPHOCYTES # BLD AUTO: 2.4 10E9/L (ref 0.8–5.3)
LYMPHOCYTES NFR BLD AUTO: 41.3 %
MCH RBC QN AUTO: 28 PG (ref 26.5–33)
MCHC RBC AUTO-ENTMCNC: 34.7 G/DL (ref 31.5–36.5)
MCV RBC AUTO: 81 FL (ref 78–100)
MONOCYTES # BLD AUTO: 0.5 10E9/L (ref 0–1.3)
MONOCYTES NFR BLD AUTO: 9.2 %
NEUTROPHILS # BLD AUTO: 2.7 10E9/L (ref 1.6–8.3)
NEUTROPHILS NFR BLD AUTO: 46.6 %
PLATELET # BLD AUTO: 223 10E9/L (ref 150–450)
POTASSIUM SERPL-SCNC: 4.3 MMOL/L (ref 3.4–5.3)
PROT SERPL-MCNC: 7.3 G/DL (ref 6.8–8.8)
RBC # BLD AUTO: 6.25 10E12/L (ref 4.4–5.9)
SODIUM SERPL-SCNC: 140 MMOL/L (ref 133–144)
WBC # BLD AUTO: 5.9 10E9/L (ref 4–11)

## 2019-06-01 PROCEDURE — 83615 LACTATE (LD) (LDH) ENZYME: CPT | Performed by: STUDENT IN AN ORGANIZED HEALTH CARE EDUCATION/TRAINING PROGRAM

## 2019-06-01 PROCEDURE — 36415 COLL VENOUS BLD VENIPUNCTURE: CPT | Performed by: STUDENT IN AN ORGANIZED HEALTH CARE EDUCATION/TRAINING PROGRAM

## 2019-06-01 PROCEDURE — 82728 ASSAY OF FERRITIN: CPT | Performed by: STUDENT IN AN ORGANIZED HEALTH CARE EDUCATION/TRAINING PROGRAM

## 2019-06-01 PROCEDURE — 80053 COMPREHEN METABOLIC PANEL: CPT | Performed by: STUDENT IN AN ORGANIZED HEALTH CARE EDUCATION/TRAINING PROGRAM

## 2019-06-01 PROCEDURE — 85025 COMPLETE CBC W/AUTO DIFF WBC: CPT | Performed by: STUDENT IN AN ORGANIZED HEALTH CARE EDUCATION/TRAINING PROGRAM

## 2019-06-01 PROCEDURE — 86140 C-REACTIVE PROTEIN: CPT | Performed by: STUDENT IN AN ORGANIZED HEALTH CARE EDUCATION/TRAINING PROGRAM

## 2019-06-01 NOTE — TELEPHONE ENCOUNTER
Called Nereida, per her report Jose has a rash on his trunk, mild joint pains, exhaustion reminiscent of his initial AOSD flare. No fevers as of yet. Currently not on any prednisone. Differential includes drug-reaction, and less likely viral exanthem.    I have placed lab orders to better evaluate severity of this likely flare. Nereida will try to get that drawn today.    For now, will tentatively plan try start 20 mg prednisone for at least 1 week, then reduce to 10 mg for 1 week, then down to 5 mg for 1 week. However, this plan may need to change pending labs and evaluation to see if Jsoe responds to the prednisone dose given.    Discussed that if patients symptoms do not improve with 2 days of prednisone, or if labs are particularly troubling, or if patient develops lightheadedness, severe fever, or mucous membranes lesions, that he should go to the ED. We will try to get him scheduled in clinic as soon as possible.    Eben Medellin

## 2019-06-01 NOTE — TELEPHONE ENCOUNTER
Called Nereida and left message as to the following:  - ferritin & LDH low  - labs look very good and do not support AOSD flare    Ok to keep taking 20 mg prednisone for up to 5 days and monitor for response but would not go longer. Can also stop if preferred.     Eben Medellin

## 2019-06-03 NOTE — TELEPHONE ENCOUNTER
Left message asking pt to return call. We can see him on Saturday, 6/15/19 at 8:30 or 9 am.    Called Nereida, pt's mother. They said pt is much better, rash is gone. Mother said they think they jumped the gun a little because pt really hasn't had a flare before. They will finish the course of 5 days of 20 mg of prednisone, then stop. If the symptoms return they will call back so that an appointment can be scheduled.    FRANKLYN Bueno RN  Rheumatology RN Coordinator  [facility]  .

## 2019-06-06 ENCOUNTER — MYC MEDICAL ADVICE (OUTPATIENT)
Dept: RHEUMATOLOGY | Facility: CLINIC | Age: 34
End: 2019-06-06

## 2019-06-07 NOTE — TELEPHONE ENCOUNTER
"Returned call to pt. The rash is less prevalent. It is a small red rash on his chest, belly, and back. It is very itchy which is the most bothersome to the pt. Jose said this rash isn't like his typical rash. \"almost looks like chicken pox\" No associated fever. No new medications, products, pet supplies, or foods. He does think he might have felt like his allergies have been acting up the last 2 days-feeling foggy, and not thinking straight. Jose reports he has been taking an antihistamine, and using cortisone cream on the rash.    Pt stated he feels the rash\" went down dramatically the last 3-4 days\". Discussed with Dr Conte. Have pt hold the Kineret Saturday and Sunday and report early on Monday what his status is. If the rash goes away completely, we may need to test dose him  again. Explained to pt that if his disease gets worse over the weekend he needs to call the Rheumatologist on call, by calling [phone].     Returned call to pt with the above recommendations. He is agreeable, voices understands that he will not take the Kineret on Sat or Sun and I will call him on Monday at 8 am to check on his status. He understands he is to contact the on call if his Still's worsens, contact number given to pt.    Will call pt on Monday at 8 am.    ARTEM BuenoN RN  Rheumatology RN Coordinator  [facility]    "

## 2019-06-10 NOTE — TELEPHONE ENCOUNTER
Pt returned call. Jose did well over the weekend, he was outside playing basketball and just being with the kids. Today those he notes the rash was worse after his hot shower. States if is redder and appears the heat of the shower worsened    Finish starting..

## 2019-06-10 NOTE — TELEPHONE ENCOUNTER
Left message on answering machine for patient to call back. Direct phone number provided to pt with in the message.    FRANKLYN Bueno RN  Rheumatology RN Coordinator  [facility]

## 2019-06-11 ENCOUNTER — OFFICE VISIT (OUTPATIENT)
Dept: DERMATOLOGY | Facility: CLINIC | Age: 34
End: 2019-06-11
Payer: COMMERCIAL

## 2019-06-11 DIAGNOSIS — M06.1 ADULT-ONSET STILL'S DISEASE (H): Primary | ICD-10-CM

## 2019-06-11 DIAGNOSIS — L73.9 FOLLICULITIS: ICD-10-CM

## 2019-06-11 DIAGNOSIS — L85.3 XEROSIS CUTIS: ICD-10-CM

## 2019-06-11 ASSESSMENT — PAIN SCALES - GENERAL: PAINLEVEL: NO PAIN (0)

## 2019-06-11 NOTE — NURSING NOTE
Dermatology Rooming Note    Jose Spears's goals for this visit include:   Chief Complaint   Patient presents with     Derm Problem     Still's Disease. Jose notes a rash on his torso x 2 weeks. He notes the rash gets worse in the sun.     Dinorah Jaimes, CMA

## 2019-06-11 NOTE — PATIENT INSTRUCTIONS
Dry skin on upper chest: Curél Hydra Therapy Wet Skin Moisturizer for Dry & Extra-Dry Skin  For folliculitis: Benzoyl peroxide wash daily, can find at local stores with goal concentration of 4-5% range on ingredients. Clindamycin lotion twice a day to inflamed, raised red spots.

## 2019-06-11 NOTE — PROGRESS NOTES
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 Folliculitis  -BPO wash daily; clindamycin external lotion 1% BID to active lesions  3. Xerosis of upper chest  -Curél Hydra Therapy Wet Skin Moisturizer for Dry & Extra-Dry Skin    Encounter Date: Jun 11, 2019    CC:  Chief Complaint   Patient presents with     Derm Problem     Still's Disease. Jose notes a rash on his torso x 2 weeks. He notes the rash gets worse in the sun.         History of Present Illness:  Mr. Jose Spears is a 33 year old male who presents for evaluation of rash.    He has had an itchy rash for a little over a month. He used prednisone again last week to see if there was improvement in the rash but it did not help. He notes some differences between this rash as it is itchy and more pronounced. Has been using some anti-histamine cream and allergy pills that seem to resolve the itchiness. The rash involves the back and chest with minor forehead involvement. When he is out in the sun or sweating the rash gets itchier. He was itching his skin before the rash appeared.    For his Adult Onset Still's disease he was hospitalized from 12/23/18-12/29/18 for fever, myalgias and maculopapular rash. His ferritin was 34,000 and was diagnosed with Adult Onset Still's disease and given a prednisone taper. After discharge he returned to the ED on 1/2/19 as his symptoms continued.  His taper did not go lower than 20 mg without return of symptoms prednisone since. He was started on anakinra 2/5/19 and does subQ injection of 100 mg daily. He completed a prednisone taper from 10 mg for one week, 5 mg for one week and 2.5 mg for the next week before ceasing in May.       Past Medical History:   Patient Active Problem List   Diagnosis     Osteogenesis imperfecta     Tobacco abuse     External hemorrhoids     Weakness     Fever and  chills     Adult-onset Still's disease (H)     Past Medical History:   Diagnosis Date     Osteogenesis imperfecta      Past Surgical History:   Procedure Laterality Date     ORTHOPEDIC SURGERY         Social History:  Patient reports that he has been smoking cigarettes.  He has been smoking about 1.00 pack per day. He has never used smokeless tobacco. He reports that he drinks alcohol. He reports that he does not use drugs. He is a  at [employer].    Family History:  Family History   Problem Relation Age of Onset     Genetic Disorder Father         OI     Diabetes Maternal Grandmother      Hypertension Maternal Grandmother      Cerebrovascular Disease Maternal Grandmother      Diabetes Maternal Aunt      Cancer - colorectal Maternal Grandfather      Melanoma No family hx of      Skin Cancer No family hx of        Medications:  Current Outpatient Medications   Medication Sig Dispense Refill     acetaminophen (TYLENOL) 325 MG tablet Take 2 tablets (650 mg) by mouth every 4 hours as needed for mild pain       anakinra (KINERET) 100 MG/0.67ML SOSY injection Inject 0.67 mLs (100 mg) Subcutaneous daily Hold for signs of infection, then seek medical attention. 30 Syringe 5     predniSONE (DELTASONE) 10 MG tablet Take 20 mg by mouth daily. (Patient not taking: Reported on 6/11/2019) 30 tablet 1     predniSONE (DELTASONE) 5 MG tablet Take 5 mg by mouth daily. (Patient not taking: Reported on 6/11/2019) 30 tablet 2     predniSONE (DELTASONE) 5 MG tablet Take 5 mg by mouth daily. (Patient not taking: Reported on 6/11/2019) 30 tablet 2     Allergies   Allergen Reactions     Codeine Nausea and Vomiting     Morphine Nausea and Vomiting         Review of Systems:  -Skin/Heme New Pt: The patient admits to frequent sun exposure. The patient denies excessive scarring or problems healing except as per HPI. The patient denies excessive bleeding.  -Constitutional: Otherwise feeling well today, in usual state of  health.  -HEENT: Patient denies nonhealing oral sores.  -Skin: As above in HPI. No additional skin concerns.    Physical exam:  Vitals: There were no vitals taken for this visit.  GEN: This is a well developed, well-nourished male in no acute distress, in a pleasant mood.    SKIN: Waist-up skin, which includes the head/face, neck, both arms, chest, back, abdomen, digits was examined.  -Forehead: mild comedonal acne with 4-5 closed comedones  -Upper chest: scattered erythematous papules and patches along neck line with minimal scale   -Back: scattered erythematous folliculocentric papules with 3-4 erythematous pustules  -No other lesions of concern on areas examined.     Impression/Plan:  1. Adult Onset Still's disease. Well managed on anakinra 100 mg subQ injection, followed by rheumatology. No joint pain, muscle pain, fevers or fatigue today. No evidence of recurrence of Still's rash on exam today. Hospitalized from 12/23/18-12/29/18 for fever, myalgias and maculopapular rash. His ferritin was 34,000 and was diagnosed with Adult Onset Still's disease and given a prednisone taper. After discharge he returned to the ED on 1/2/19 as his symptoms continued.  His taper did not go lower than 20 mg without return of symptoms prednisone since. He was started on anakinra 2/5/19 and does subQ injection of 100 mg daily. He completed a prednisone taper from 10 mg for one week, 5 mg for one week and 2.5 mg for the next week before ceasing in May.    No evidence of active Adult Onset Still's disease on exam today    Continue seeing rheumatology for management    2. Folliculitis of back and chest. He has a history of pruritic skin rash for ~ 1 month. On exam, presence of scattered erythematous folliculocentric papules with 3-4 erythematous pustules on his back. Severity appears mild at this time and is consistent with classic bacterial folliculitis.    Start OTC Benzoyl peroxide wash daily, concentration of 4-5% range    Start  clindamycin external lotion 1% twice a day to inflamed, raised red spots    If no improvement in symptoms in 2 months, return to clinic for evaluation    3. Xerosis likely associated to contact dermatitis. Intermixed between folliculocentric erythematous papules, presence of erythematous rash with scale localized to neck line. Will plan to treat as xerosis as likely caused by sweat on shirts rubbing to the area. Considered dermatomyositis due to cape-like distribution but does not have any muscle or joint pain today as well as rash appearing more localized to contact. If no improvement with standard skin care practice, will consider biopsy for further diagnosis.    Curél Hydra Therapy Wet Skin Moisturizer for Dry & Extra-Dry Skin    If no improvement in symptoms in 2 months, return to clinic for evaluation    CC Rome Conte MD  [address] [place] [address] on close of this encounter.  Follow-up in 6 months, earlier for new or changing lesions.     Staff Involved:  I, Sumeet Ma MS4, saw and examined the patient in the presence of Dr. Mckinley.    Staff Physician:  I was present with the medical student who participated in the service and in the documentation of the note. I have verified the history and personally performed the physical exam and medical decision making. I agree with the assessment and plan of care as documented in the note.     Joey Mckinley MD  Staff Dermatologist and Dermatopathologist  , Department of Dermatology

## 2019-06-11 NOTE — LETTER
6/11/2019       RE: Jose Spears  [id number] [address] [id number]     Dear Colleague,    Thank you for referring your patient, Jose Spears, to the [facility] DERMATOLOGY at [facility]. Please see a copy of my visit note below.    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 Folliculitis  -BPO wash daily; clindamycin external lotion 1% BID to active lesions  3. Xerosis of upper chest  -Curél Hydra Therapy Wet Skin Moisturizer for Dry & Extra-Dry Skin    Encounter Date: Jun 11, 2019    CC:  Chief Complaint   Patient presents with     Derm Problem     Still's Disease. Jose notes a rash on his torso x 2 weeks. He notes the rash gets worse in the sun.         History of Present Illness:  Mr. Jose Spears is a 33 year old male who presents for evaluation of rash.    He has had an itchy rash for a little over a month. He used prednisone again last week to see if there was improvement in the rash but it did not help. He notes some differences between this rash as it is itchy and more pronounced. Has been using some anti-histamine cream and allergy pills that seem to resolve the itchiness. The rash involves the back and chest with minor forehead involvement. When he is out in the sun or sweating the rash gets itchier. He was itching his skin before the rash appeared.    For his Adult Onset Still's disease he was hospitalized from 12/23/18-12/29/18 for fever, myalgias and maculopapular rash. His ferritin was 34,000 and was diagnosed with Adult Onset Still's disease and given a prednisone taper. After discharge he returned to the ED on 1/2/19 as his symptoms continued.  His taper did not go lower than 20 mg without return of symptoms prednisone since. He was started on anakinra 2/5/19 and does subQ injection of 100 mg daily. He  completed a prednisone taper from 10 mg for one week, 5 mg for one week and 2.5 mg for the next week before ceasing in May.       Past Medical History:   Patient Active Problem List   Diagnosis     Osteogenesis imperfecta     Tobacco abuse     External hemorrhoids     Weakness     Fever and chills     Adult-onset Still's disease (H)     Past Medical History:   Diagnosis Date     Osteogenesis imperfecta      Past Surgical History:   Procedure Laterality Date     ORTHOPEDIC SURGERY         Social History:  Patient reports that he has been smoking cigarettes.  He has been smoking about 1.00 pack per day. He has never used smokeless tobacco. He reports that he drinks alcohol. He reports that he does not use drugs. He is a  at [employer].    Family History:  Family History   Problem Relation Age of Onset     Genetic Disorder Father         OI     Diabetes Maternal Grandmother      Hypertension Maternal Grandmother      Cerebrovascular Disease Maternal Grandmother      Diabetes Maternal Aunt      Cancer - colorectal Maternal Grandfather      Melanoma No family hx of      Skin Cancer No family hx of        Medications:  Current Outpatient Medications   Medication Sig Dispense Refill     acetaminophen (TYLENOL) 325 MG tablet Take 2 tablets (650 mg) by mouth every 4 hours as needed for mild pain       anakinra (KINERET) 100 MG/0.67ML SOSY injection Inject 0.67 mLs (100 mg) Subcutaneous daily Hold for signs of infection, then seek medical attention. 30 Syringe 5     predniSONE (DELTASONE) 10 MG tablet Take 20 mg by mouth daily. (Patient not taking: Reported on 6/11/2019) 30 tablet 1     predniSONE (DELTASONE) 5 MG tablet Take 5 mg by mouth daily. (Patient not taking: Reported on 6/11/2019) 30 tablet 2     predniSONE (DELTASONE) 5 MG tablet Take 5 mg by mouth daily. (Patient not taking: Reported on 6/11/2019) 30 tablet 2     Allergies   Allergen Reactions     Codeine Nausea and Vomiting     Morphine Nausea and  Vomiting         Review of Systems:  -Skin/Heme New Pt: The patient admits to frequent sun exposure. The patient denies excessive scarring or problems healing except as per HPI. The patient denies excessive bleeding.  -Constitutional: Otherwise feeling well today, in usual state of health.  -HEENT: Patient denies nonhealing oral sores.  -Skin: As above in HPI. No additional skin concerns.    Physical exam:  Vitals: There were no vitals taken for this visit.  GEN: This is a well developed, well-nourished male in no acute distress, in a pleasant mood.    SKIN: Waist-up skin, which includes the head/face, neck, both arms, chest, back, abdomen, digits was examined.  -Forehead: mild comedonal acne with 4-5 closed comedones  -Upper chest: scattered erythematous papules and patches along neck line with minimal scale   -Back: scattered erythematous folliculocentric papules with 3-4 erythematous pustules  -No other lesions of concern on areas examined.     Impression/Plan:  1. Adult Onset Still's disease. Well managed on anakinra 100 mg subQ injection, followed by rheumatology. No joint pain, muscle pain, fevers or fatigue today. No evidence of recurrence of Still's rash on exam today. Hospitalized from 12/23/18-12/29/18 for fever, myalgias and maculopapular rash. His ferritin was 34,000 and was diagnosed with Adult Onset Still's disease and given a prednisone taper. After discharge he returned to the ED on 1/2/19 as his symptoms continued.  His taper did not go lower than 20 mg without return of symptoms prednisone since. He was started on anakinra 2/5/19 and does subQ injection of 100 mg daily. He completed a prednisone taper from 10 mg for one week, 5 mg for one week and 2.5 mg for the next week before ceasing in May.    No evidence of active Adult Onset Still's disease on exam today    Continue seeing rheumatology for management    2. Folliculitis of back and chest. He has a history of pruritic skin rash for ~ 1 month.  On exam, presence of scattered erythematous folliculocentric papules with 3-4 erythematous pustules on his back. Severity appears mild at this time and is consistent with classic bacterial folliculitis.    Start OTC Benzoyl peroxide wash daily, concentration of 4-5% range    Start clindamycin external lotion 1% twice a day to inflamed, raised red spots    If no improvement in symptoms in 2 months, return to clinic for evaluation    3. Xerosis likely associated to contact dermatitis. Intermixed between folliculocentric erythematous papules, presence of erythematous rash with scale localized to neck line. Will plan to treat as xerosis as likely caused by sweat on shirts rubbing to the area. Considered dermatomyositis due to cape-like distribution but does not have any muscle or joint pain today as well as rash appearing more localized to contact. If no improvement with standard skin care practice, will consider biopsy for further diagnosis.    Curél Hydra Therapy Wet Skin Moisturizer for Dry & Extra-Dry Skin    If no improvement in symptoms in 2 months, return to clinic for evaluation    CC Rome Conte MD  [address] [place] [address] on close of this encounter.  Follow-up in 6 months, earlier for new or changing lesions.     Staff Involved:  I, Smueet Ma MS4, saw and examined the patient in the presence of Dr. Mckinley.    Staff Physician:  I was present with the medical student who participated in the service and in the documentation of the note. I have verified the history and personally performed the physical exam and medical decision making. I agree with the assessment and plan of care as documented in the note.     Joey Mckinley MD  Staff Dermatologist and Dermatopathologist  , Department of Dermatology

## 2019-06-11 NOTE — TELEPHONE ENCOUNTER
Dr Conte spoke with Dr Mckinley who reports pt has a folliculitis which is not related to the Kineret. Dr Conte wants pt to restart the Kineret.    Called pt and advised that he take his Kineret this evening and to continue every day. Pt verbalized understanding.     Pt had questions about how long he will have to be on the kineret or if he needs to be on it because he was able to go 4 days without it.. Offered appointment with Dr Conte on Saturday, 6/15/19, at 11:30 am to discuss this. He will come in on Sat, 6/15/19 at 11:30 am for a follow up appointment. Staff message sent to the pool asking for assistance in scheduling this appointment.    ARTEM BuenoN RN  Rheumatology RN Coordinator   [facility]

## 2019-06-15 ENCOUNTER — OFFICE VISIT (OUTPATIENT)
Dept: RHEUMATOLOGY | Facility: CLINIC | Age: 34
End: 2019-06-15
Attending: INTERNAL MEDICINE
Payer: COMMERCIAL

## 2019-06-15 VITALS
SYSTOLIC BLOOD PRESSURE: 125 MMHG | DIASTOLIC BLOOD PRESSURE: 76 MMHG | WEIGHT: 222.6 LBS | BODY MASS INDEX: 31.16 KG/M2 | TEMPERATURE: 98.4 F | RESPIRATION RATE: 18 BRPM | HEIGHT: 71 IN | HEART RATE: 71 BPM

## 2019-06-15 DIAGNOSIS — M06.1 ADULT-ONSET STILL'S DISEASE (H): Primary | ICD-10-CM

## 2019-06-15 DIAGNOSIS — R21 RASH OF FACE: ICD-10-CM

## 2019-06-15 DIAGNOSIS — Z51.81 MEDICATION MONITORING ENCOUNTER: ICD-10-CM

## 2019-06-15 PROCEDURE — G0463 HOSPITAL OUTPT CLINIC VISIT: HCPCS | Mod: ZF

## 2019-06-15 ASSESSMENT — MIFFLIN-ST. JEOR: SCORE: 1981.84

## 2019-06-15 ASSESSMENT — PAIN SCALES - GENERAL: PAINLEVEL: NO PAIN (0)

## 2019-06-15 NOTE — PROGRESS NOTES
[facility]  Rheumatology Clinic  Rome Conte MD  06/15/2019     Name: Jose Spears  MRN: [id number]  Age: 33 year old  : 1985  Referring provider: Referred Self     Assessment and Plan:  Adult-onset Still's disease (H)  Jose Spears is a 33 year old male who initially presented with fever, rash, and ferritin of 34K in 2018.  He has subsequently finished a high dose Prednisone taper and was on Anakinra until 1 week ago when he developed a truncal rash.  He has subsequently been seen by dermatology and I agree the rash appears like folliculitis.  He has not had any flare of symptoms off Anakinra and he is interested in discontinuing this.  Discussed there is a chance he could have a flare of his disease off of Anakinra and if this was needed again, it may not work as well.  For now, will hold Anakinra and have him do weekly CRP, hepatic panel, and ferritin levels weekly.  If these start to rise, can then restart Anakinra.  If he developed any recurrent fevers, rash, sore throat, or myalgias, he is to contact clinic immediately.  Smoking cessation encouraged.      - CRP inflammation  - Hepatic panel  - Ferritin     Follow-up: in approximately 2 - 3 months     HORACIO Conte MD, PhD    Rheumatology      HPI:   Jose Spears is a 33 year old male with a history of adult-onset Still's disease on Anakinra who presents for follow up.  The patient was diagnosed with Adult-onset Still's disease in 2018 after presenting with fever, myalgias, sore throat, salmon-colored rash, and ferritin of 34k.  He was started on Prednisone 60 mg on 1/3/19 but was unable to taper below 20 mg without return of symptoms so started Anakinra 100 mg subcutaneous daily on 19.  With this, his symptoms significantly improved and at his last appointment on 19, we discussed a taper of Prednisone from 10 mg to 5 mg for 1 week, then 2.5 mg for 1 week, then discontinued.    He  developed a truncal rash, mild joint pain, and exhaustion somewhat reminiscent of his initial presentation with AOSD just over 2 weeks ago.  He did not have fever at that time.  Labs drawn on 6/1/19 with normal ferritin, LHD, and CRP.  He completed a 5-day course of Prednisone without much change in his rash.  His Anakinra was held out of concern for possible drug reaction.  He saw dermatology on 6/11/19 who felt his rash was related to folliculitis and xerosis likely related to contact dermatitis.      He has not had an Anakinra for a week now and his rash has continued to improve in that time period.  He still has a little spot on his back and upper chest.  He is wondering if he needs to continue Anakinra since he has not had any new or worsening symptoms.       Review of Systems:   Pertinent items are noted in HPI or as below, remainder of complete ROS is negative.      No recent problems with hearing or vision. No swallowing problems.   No breathing difficulty, shortness of breath, coughing, or wheezing  No chest pain or palpitations  No heart burn, indigestion, abdominal pain, nausea, vomiting, diarrhea  No urination problems, no bloody, cloudy urine, no dysuria  No numbing, tingling, weakness  No headaches or confusion  No easy bleeding or bruising.     Active Medications:      acetaminophen (TYLENOL) 325 MG tablet, Take 2 tablets (650 mg) by mouth every 4 hours as needed for mild pain, Disp: , Rfl:      anakinra (KINERET) 100 MG/0.67ML SOSY injection, Inject 0.67 mLs (100 mg) Subcutaneous daily Hold for signs of infection, then seek medical attention. (Patient not taking: Reported on 6/15/2019), Disp: 30 Syringe, Rfl: 5     predniSONE (DELTASONE) 10 MG tablet, Take 20 mg by mouth daily. (Patient not taking: Reported on 6/11/2019), Disp: 30 tablet, Rfl: 1     predniSONE (DELTASONE) 5 MG tablet, Take 5 mg by mouth daily. (Patient not taking: Reported on 6/11/2019), Disp: 30 tablet, Rfl: 2     predniSONE  "(DELTASONE) 5 MG tablet, Take 5 mg by mouth daily. (Patient not taking: Reported on 6/15/2019), Disp: 30 tablet, Rfl: 2      Allergies:   Codeine and Morphine      Past Medical History:  Adult-onset Still's disease  Osteogenesis imperfecta  External hemorrhoids  Tobacco abuse     Past Surgical History:  Orthopedic surgery    Family History:   Osteogenesis imperfecta - father  Diabetes - maternal grandmother  Hypertension - maternal grandmother  Stroke - maternal grandmother  Diabetes - maternal aunt  Colorectal cancer - maternal grandfather      Social History:   Presents to clinic with his wife.  Tobacco Use: Current daily smoker, 1 PPD.   Alcohol Use: Occasional alcohol use.   PCP: Physician No Ref-Primary      Physical Exam:   /76   Pulse 71   Temp 98.4  F (36.9  C) (Oral)   Resp 18   Ht 1.803 m (5' 11\")   Wt 101 kg (222 lb 9.6 oz)   BMI 31.05 kg/m     Wt Readings from Last 4 Encounters:   06/15/19 101 kg (222 lb 9.6 oz)   04/23/19 99.7 kg (219 lb 14.4 oz)   02/26/19 96.6 kg (213 lb)   01/28/19 96.6 kg (213 lb)     Constitutional: Well-developed, appearing stated age; cooperative  Eyes: Normal EOM, PERRLA, vision, conjunctiva, sclera  ENT: Normal external ears, nose, hearing, lips, teeth, gums, throat. No mucous membrane lesions, normal saliva pool  Neck: No mass or thyroid enlargement  Resp: Lungs clear to auscultation, nl to palpation  CV: RRR, no murmurs, rubs or gallops, no edema  GI: No ABD mass or tenderness, no HSM  : Not tested  Lymph: No cervical, supraclavicular, inguinal or epitrochlear nodes  MS: The TMJ, neck, shoulder, elbow, wrist, MCP/PIP/DIP, spine, hip, knee, ankle, and foot MTP/IP joints were examined and found normal. No active synovitis or altered joint anatomy. Full joint ROM. Normal  strength. No dactylitis,  tenosynovitis, enthesopathy.  Skin: No nail pitting, alopecia.  Faint folliculitis over the upper back and chest.  Neuro: Normal cranial nerves, strength, sensation, " DTRs.   Psych: Normal judgement, orientation, memory, affect.     Laboratory:   RHEUM RESULTS Latest Ref Rng & Units 2/26/2019 4/23/2019 6/1/2019   SED RATE 0 - 15 mm/h 3 - -   CRP, INFLAMMATION 0.0 - 8.0 mg/L <2.9 <2.9 3.4   CK TOTAL 30 - 300 U/L - - -   RHEUMATOID FACTOR <20 IU/mL - - -   AST 0 - 45 U/L 24 22 17   ALT 0 - 70 U/L 45 46 30   ALBUMIN 3.4 - 5.0 g/dL 3.8 4.4 4.3   WBC 4.0 - 11.0 10e9/L 7.6 6.5 5.9   RBC 4.4 - 5.9 10e12/L 5.43 5.94(H) 6.25(H)   HGB 13.3 - 17.7 g/dL 14.4 16.4 17.5   HCT 40.0 - 53.0 % 46.8 49.5 50.4   MCV 78 - 100 fl 86 83 81   MCHC 31.5 - 36.5 g/dL 30.8(L) 33.1 34.7   RDW 10.0 - 15.0 % 15.9(H) 14.2 14.6    - 450 10e9/L 248 249 223   CREATININE 0.66 - 1.25 mg/dL 0.73 - 1.03   GFR ESTIMATE, IF BLACK >60 mL/min/[1.73:m2] >90 - >90   GFR ESTIMATE >60 mL/min/[1.73:m2] >90 - >90    - 1,620 mg/dL - - -   IGA 70 - 380 mg/dL - - -   IGM 60 - 265 mg/dL - - -         Scribe Disclosure:  I, Kristyn Perez, am serving as a scribe to document services personally performed by Rome Conte MD at this visit, based upon the provider's statements to me. All documentation has been reviewed by the aforementioned provider prior to being entered into the official medical record.

## 2019-06-15 NOTE — LETTER
Date:June 18, 2019      Patient was self referred, no letter generated. Do not send.        [facility] Health Information

## 2019-06-15 NOTE — LETTER
6/15/2019       RE: Jose Spears  [address] [facility] [address]     Dear Colleague,    Thank you for referring your patient, Jose Spears, to the [facility] RHEUMATOLOGY at [facility]. Please see a copy of my visit note below.    [facility]  Rheumatology Clinic  Rome Conte MD  06/15/2019     Name: Jose Spears  MRN: [id number]  Age: 33 year old  : 1985  Referring provider: Referred Self     Assessment and Plan:  Adult-onset Still's disease (H)  Jose Spears is a 33 year old male who initially presented with fever, rash, and ferritin of 34K in 2018.  He has subsequently finished a high dose Prednisone taper and was on Anakinra until 1 week ago when he developed a truncal rash.  He has subsequently been seen by dermatology and I agree the rash appears like folliculitis.  He has not had any flare of symptoms off Anakinra and he is interested in discontinuing this.  Discussed there is a chance he could have a flare of his disease off of Anakinra and if this was needed again, it may not work as well.  For now, will hold Anakinra and have him do weekly CRP, hepatic panel, and ferritin levels weekly.  If these start to rise, can then restart Anakinra.  If he developed any recurrent fevers, rash, sore throat, or myalgias, he is to contact clinic immediately.  Smoking cessation encouraged.      - CRP inflammation  - Hepatic panel  - Ferritin     Follow-up: in approximately 2 - 3 months     HORACIO Conte MD, PhD    Rheumatology      HPI:   Jose Spears is a 33 year old male with a history of adult-onset Still's disease on Anakinra who presents for follow up.  The patient was diagnosed with Adult-onset Still's disease in 2018 after presenting with fever, myalgias, sore throat, salmon-colored rash, and ferritin of 34k.  He was started on Prednisone 60 mg on 1/3/19 but was unable to taper below 20 mg without  return of symptoms so started Anakinra 100 mg subcutaneous daily on 2/5/19.  With this, his symptoms significantly improved and at his last appointment on 4/23/19, we discussed a taper of Prednisone from 10 mg to 5 mg for 1 week, then 2.5 mg for 1 week, then discontinued.    He developed a truncal rash, mild joint pain, and exhaustion somewhat reminiscent of his initial presentation with AOSD just over 2 weeks ago.  He did not have fever at that time.  Labs drawn on 6/1/19 with normal ferritin, LHD, and CRP.  He completed a 5-day course of Prednisone without much change in his rash.  His Anakinra was held out of concern for possible drug reaction.  He saw dermatology on 6/11/19 who felt his rash was related to folliculitis and xerosis likely related to contact dermatitis.      He has not had an Anakinra for a week now and his rash has continued to improve in that time period.  He still has a little spot on his back and upper chest.  He is wondering if he needs to continue Anakinra since he has not had any new or worsening symptoms.       Review of Systems:   Pertinent items are noted in HPI or as below, remainder of complete ROS is negative.      No recent problems with hearing or vision. No swallowing problems.   No breathing difficulty, shortness of breath, coughing, or wheezing  No chest pain or palpitations  No heart burn, indigestion, abdominal pain, nausea, vomiting, diarrhea  No urination problems, no bloody, cloudy urine, no dysuria  No numbing, tingling, weakness  No headaches or confusion  No easy bleeding or bruising.     Active Medications:      acetaminophen (TYLENOL) 325 MG tablet, Take 2 tablets (650 mg) by mouth every 4 hours as needed for mild pain, Disp: , Rfl:      anakinra (KINERET) 100 MG/0.67ML SOSY injection, Inject 0.67 mLs (100 mg) Subcutaneous daily Hold for signs of infection, then seek medical attention. (Patient not taking: Reported on 6/15/2019), Disp: 30 Syringe, Rfl: 5     predniSONE  "(DELTASONE) 10 MG tablet, Take 20 mg by mouth daily. (Patient not taking: Reported on 6/11/2019), Disp: 30 tablet, Rfl: 1     predniSONE (DELTASONE) 5 MG tablet, Take 5 mg by mouth daily. (Patient not taking: Reported on 6/11/2019), Disp: 30 tablet, Rfl: 2     predniSONE (DELTASONE) 5 MG tablet, Take 5 mg by mouth daily. (Patient not taking: Reported on 6/15/2019), Disp: 30 tablet, Rfl: 2      Allergies:   Codeine and Morphine      Past Medical History:  Adult-onset Still's disease  Osteogenesis imperfecta  External hemorrhoids  Tobacco abuse     Past Surgical History:  Orthopedic surgery    Family History:   Osteogenesis imperfecta - father  Diabetes - maternal grandmother  Hypertension - maternal grandmother  Stroke - maternal grandmother  Diabetes - maternal aunt  Colorectal cancer - maternal grandfather      Social History:   Presents to clinic with his wife.  Tobacco Use: Current daily smoker, 1 PPD.   Alcohol Use: Occasional alcohol use.   PCP: Physician No Ref-Primary      Physical Exam:   /76   Pulse 71   Temp 98.4  F (36.9  C) (Oral)   Resp 18   Ht 1.803 m (5' 11\")   Wt 101 kg (222 lb 9.6 oz)   BMI 31.05 kg/m      Wt Readings from Last 4 Encounters:   06/15/19 101 kg (222 lb 9.6 oz)   04/23/19 99.7 kg (219 lb 14.4 oz)   02/26/19 96.6 kg (213 lb)   01/28/19 96.6 kg (213 lb)     Constitutional: Well-developed, appearing stated age; cooperative  Eyes: Normal EOM, PERRLA, vision, conjunctiva, sclera  ENT: Normal external ears, nose, hearing, lips, teeth, gums, throat. No mucous membrane lesions, normal saliva pool  Neck: No mass or thyroid enlargement  Resp: Lungs clear to auscultation, nl to palpation  CV: RRR, no murmurs, rubs or gallops, no edema  GI: No ABD mass or tenderness, no HSM  : Not tested  Lymph: No cervical, supraclavicular, inguinal or epitrochlear nodes  MS: The TMJ, neck, shoulder, elbow, wrist, MCP/PIP/DIP, spine, hip, knee, ankle, and foot MTP/IP joints were examined and found " normal. No active synovitis or altered joint anatomy. Full joint ROM. Normal  strength. No dactylitis,  tenosynovitis, enthesopathy.  Skin: No nail pitting, alopecia.  Faint folliculitis over the upper back and chest.  Neuro: Normal cranial nerves, strength, sensation, DTRs.   Psych: Normal judgement, orientation, memory, affect.     Laboratory:   RHEUM RESULTS Latest Ref Rng & Units 2/26/2019 4/23/2019 6/1/2019   SED RATE 0 - 15 mm/h 3 - -   CRP, INFLAMMATION 0.0 - 8.0 mg/L <2.9 <2.9 3.4   CK TOTAL 30 - 300 U/L - - -   RHEUMATOID FACTOR <20 IU/mL - - -   AST 0 - 45 U/L 24 22 17   ALT 0 - 70 U/L 45 46 30   ALBUMIN 3.4 - 5.0 g/dL 3.8 4.4 4.3   WBC 4.0 - 11.0 10e9/L 7.6 6.5 5.9   RBC 4.4 - 5.9 10e12/L 5.43 5.94(H) 6.25(H)   HGB 13.3 - 17.7 g/dL 14.4 16.4 17.5   HCT 40.0 - 53.0 % 46.8 49.5 50.4   MCV 78 - 100 fl 86 83 81   MCHC 31.5 - 36.5 g/dL 30.8(L) 33.1 34.7   RDW 10.0 - 15.0 % 15.9(H) 14.2 14.6    - 450 10e9/L 248 249 223   CREATININE 0.66 - 1.25 mg/dL 0.73 - 1.03   GFR ESTIMATE, IF BLACK >60 mL/min/[1.73:m2] >90 - >90   GFR ESTIMATE >60 mL/min/[1.73:m2] >90 - >90    - 1,620 mg/dL - - -   IGA 70 - 380 mg/dL - - -   IGM 60 - 265 mg/dL - - -         Scribe Disclosure:  I, Kristyn Perez, am serving as a scribe to document services personally performed by Rome Conte MD at this visit, based upon the provider's statements to me. All documentation has been reviewed by the aforementioned provider prior to being entered into the official medical record.    Again, thank you for allowing me to participate in the care of your patient.      Sincerely,    Rome Conte MD

## 2019-06-15 NOTE — LETTER
6/15/2019      RE: Jose Spears  [address] [address] [facility] [address]       [facility]  Rheumatology Clinic  Rome Conte MD  06/15/2019     Name: Jose Spears  MRN: [id number]  Age: 33 year old  : 1985  Referring provider: Referred Self     Assessment and Plan:  Adult-onset Still's disease (H)  Jose Spears is a 33 year old male who initially presented with fever, rash, and ferritin of 34K in 2018.  He has subsequently finished a high dose Prednisone taper and was on Anakinra until 1 week ago when he developed a truncal rash.  He has subsequently been seen by dermatology and I agree the rash appears like folliculitis.  He has not had any flare of symptoms off Anakinra and he is interested in discontinuing this.  Discussed there is a chance he could have a flare of his disease off of Anakinra and if this was needed again, it may not work as well.  For now, will hold Anakinra and have him do weekly CRP, hepatic panel, and ferritin levels weekly.  If these start to rise, can then restart Anakinra.  If he developed any recurrent fevers, rash, sore throat, or myalgias, he is to contact clinic immediately.  Smoking cessation encouraged.      - CRP inflammation  - Hepatic panel  - Ferritin     Follow-up: in approximately 2 - 3 months     HORACIO Conte MD, PhD    Rheumatology      HPI:   Jose Spears is a 33 year old male with a history of adult-onset Still's disease on Anakinra who presents for follow up.  The patient was diagnosed with Adult-onset Still's disease in 2018 after presenting with fever, myalgias, sore throat, salmon-colored rash, and ferritin of 34k.  He was started on Prednisone 60 mg on 1/3/19 but was unable to taper below 20 mg without return of symptoms so started Anakinra 100 mg subcutaneous daily on 19.  With this, his symptoms significantly improved and at his last appointment on 19, we discussed a taper of Prednisone  from 10 mg to 5 mg for 1 week, then 2.5 mg for 1 week, then discontinued.    He developed a truncal rash, mild joint pain, and exhaustion somewhat reminiscent of his initial presentation with AOSD just over 2 weeks ago.  He did not have fever at that time.  Labs drawn on 6/1/19 with normal ferritin, LHD, and CRP.  He completed a 5-day course of Prednisone without much change in his rash.  His Anakinra was held out of concern for possible drug reaction.  He saw dermatology on 6/11/19 who felt his rash was related to folliculitis and xerosis likely related to contact dermatitis.      He has not had an Anakinra for a week now and his rash has continued to improve in that time period.  He still has a little spot on his back and upper chest.  He is wondering if he needs to continue Anakinra since he has not had any new or worsening symptoms.       Review of Systems:   Pertinent items are noted in HPI or as below, remainder of complete ROS is negative.      No recent problems with hearing or vision. No swallowing problems.   No breathing difficulty, shortness of breath, coughing, or wheezing  No chest pain or palpitations  No heart burn, indigestion, abdominal pain, nausea, vomiting, diarrhea  No urination problems, no bloody, cloudy urine, no dysuria  No numbing, tingling, weakness  No headaches or confusion  No easy bleeding or bruising.     Active Medications:      acetaminophen (TYLENOL) 325 MG tablet, Take 2 tablets (650 mg) by mouth every 4 hours as needed for mild pain, Disp: , Rfl:      anakinra (KINERET) 100 MG/0.67ML SOSY injection, Inject 0.67 mLs (100 mg) Subcutaneous daily Hold for signs of infection, then seek medical attention. (Patient not taking: Reported on 6/15/2019), Disp: 30 Syringe, Rfl: 5     predniSONE (DELTASONE) 10 MG tablet, Take 20 mg by mouth daily. (Patient not taking: Reported on 6/11/2019), Disp: 30 tablet, Rfl: 1     predniSONE (DELTASONE) 5 MG tablet, Take 5 mg by mouth daily. (Patient  "not taking: Reported on 6/11/2019), Disp: 30 tablet, Rfl: 2     predniSONE (DELTASONE) 5 MG tablet, Take 5 mg by mouth daily. (Patient not taking: Reported on 6/15/2019), Disp: 30 tablet, Rfl: 2      Allergies:   Codeine and Morphine      Past Medical History:  Adult-onset Still's disease  Osteogenesis imperfecta  External hemorrhoids  Tobacco abuse     Past Surgical History:  Orthopedic surgery    Family History:   Osteogenesis imperfecta - father  Diabetes - maternal grandmother  Hypertension - maternal grandmother  Stroke - maternal grandmother  Diabetes - maternal aunt  Colorectal cancer - maternal grandfather      Social History:   Presents to clinic with his wife.  Tobacco Use: Current daily smoker, 1 PPD.   Alcohol Use: Occasional alcohol use.   PCP: Physician No Ref-Primary      Physical Exam:   /76   Pulse 71   Temp 98.4  F (36.9  C) (Oral)   Resp 18   Ht 1.803 m (5' 11\")   Wt 101 kg (222 lb 9.6 oz)   BMI 31.05 kg/m      Wt Readings from Last 4 Encounters:   06/15/19 101 kg (222 lb 9.6 oz)   04/23/19 99.7 kg (219 lb 14.4 oz)   02/26/19 96.6 kg (213 lb)   01/28/19 96.6 kg (213 lb)     Constitutional: Well-developed, appearing stated age; cooperative  Eyes: Normal EOM, PERRLA, vision, conjunctiva, sclera  ENT: Normal external ears, nose, hearing, lips, teeth, gums, throat. No mucous membrane lesions, normal saliva pool  Neck: No mass or thyroid enlargement  Resp: Lungs clear to auscultation, nl to palpation  CV: RRR, no murmurs, rubs or gallops, no edema  GI: No ABD mass or tenderness, no HSM  : Not tested  Lymph: No cervical, supraclavicular, inguinal or epitrochlear nodes  MS: The TMJ, neck, shoulder, elbow, wrist, MCP/PIP/DIP, spine, hip, knee, ankle, and foot MTP/IP joints were examined and found normal. No active synovitis or altered joint anatomy. Full joint ROM. Normal  strength. No dactylitis,  tenosynovitis, enthesopathy.  Skin: No nail pitting, alopecia.  Faint folliculitis over " the upper back and chest.  Neuro: Normal cranial nerves, strength, sensation, DTRs.   Psych: Normal judgement, orientation, memory, affect.     Laboratory:   RHEUM RESULTS Latest Ref Rng & Units 2/26/2019 4/23/2019 6/1/2019   SED RATE 0 - 15 mm/h 3 - -   CRP, INFLAMMATION 0.0 - 8.0 mg/L <2.9 <2.9 3.4   CK TOTAL 30 - 300 U/L - - -   RHEUMATOID FACTOR <20 IU/mL - - -   AST 0 - 45 U/L 24 22 17   ALT 0 - 70 U/L 45 46 30   ALBUMIN 3.4 - 5.0 g/dL 3.8 4.4 4.3   WBC 4.0 - 11.0 10e9/L 7.6 6.5 5.9   RBC 4.4 - 5.9 10e12/L 5.43 5.94(H) 6.25(H)   HGB 13.3 - 17.7 g/dL 14.4 16.4 17.5   HCT 40.0 - 53.0 % 46.8 49.5 50.4   MCV 78 - 100 fl 86 83 81   MCHC 31.5 - 36.5 g/dL 30.8(L) 33.1 34.7   RDW 10.0 - 15.0 % 15.9(H) 14.2 14.6    - 450 10e9/L 248 249 223   CREATININE 0.66 - 1.25 mg/dL 0.73 - 1.03   GFR ESTIMATE, IF BLACK >60 mL/min/[1.73:m2] >90 - >90   GFR ESTIMATE >60 mL/min/[1.73:m2] >90 - >90    - 1,620 mg/dL - - -   IGA 70 - 380 mg/dL - - -   IGM 60 - 265 mg/dL - - -         Scribe Disclosure:  I, Kristyn Ana, am serving as a scribe to document services personally performed by Rome Conte MD at this visit, based upon the provider's statements to me. All documentation has been reviewed by the aforementioned provider prior to being entered into the official medical record.    Rome Conte MD

## 2019-06-15 NOTE — LETTER
Date:June 18, 2019      Patient was self referred, no letter generated. Do not send.        [facility] Health Information

## 2019-06-15 NOTE — NURSING NOTE
"Chief Complaint   Patient presents with     RECHECK     AoSD Follow up     Blood pressure 125/76, pulse 71, temperature 98.4  F (36.9  C), temperature source Oral, resp. rate 18, height 1.803 m (5' 11\"), weight 101 kg (222 lb 9.6 oz).    Jamin Lai CMA on 6/15/2019 at 11:26 AM    "

## 2019-06-19 DIAGNOSIS — M06.1 ADULT-ONSET STILL'S DISEASE (H): ICD-10-CM

## 2019-06-19 LAB — CRP SERPL-MCNC: <2.9 MG/L (ref 0–8)

## 2019-06-19 PROCEDURE — 82728 ASSAY OF FERRITIN: CPT | Performed by: INTERNAL MEDICINE

## 2019-06-19 PROCEDURE — 86140 C-REACTIVE PROTEIN: CPT | Performed by: INTERNAL MEDICINE

## 2019-06-19 PROCEDURE — 80076 HEPATIC FUNCTION PANEL: CPT | Performed by: INTERNAL MEDICINE

## 2019-06-19 PROCEDURE — 36415 COLL VENOUS BLD VENIPUNCTURE: CPT | Performed by: INTERNAL MEDICINE

## 2019-06-20 LAB
ALBUMIN SERPL-MCNC: 4.5 G/DL (ref 3.4–5)
ALP SERPL-CCNC: 72 U/L (ref 40–150)
ALT SERPL W P-5'-P-CCNC: 30 U/L (ref 0–70)
AST SERPL W P-5'-P-CCNC: 16 U/L (ref 0–45)
BILIRUB DIRECT SERPL-MCNC: 0.1 MG/DL (ref 0–0.2)
BILIRUB SERPL-MCNC: 0.5 MG/DL (ref 0.2–1.3)
FERRITIN SERPL-MCNC: 67 NG/ML (ref 26–388)
PROT SERPL-MCNC: 7.2 G/DL (ref 6.8–8.8)

## 2019-06-27 DIAGNOSIS — M06.1 ADULT-ONSET STILL'S DISEASE (H): ICD-10-CM

## 2019-06-27 PROCEDURE — 36415 COLL VENOUS BLD VENIPUNCTURE: CPT | Performed by: INTERNAL MEDICINE

## 2019-06-27 PROCEDURE — 86140 C-REACTIVE PROTEIN: CPT | Performed by: INTERNAL MEDICINE

## 2019-06-27 PROCEDURE — 80076 HEPATIC FUNCTION PANEL: CPT | Performed by: INTERNAL MEDICINE

## 2019-06-27 PROCEDURE — 82728 ASSAY OF FERRITIN: CPT | Performed by: INTERNAL MEDICINE

## 2019-06-28 LAB
ALBUMIN SERPL-MCNC: 4.4 G/DL (ref 3.4–5)
ALP SERPL-CCNC: 77 U/L (ref 40–150)
ALT SERPL W P-5'-P-CCNC: 33 U/L (ref 0–70)
AST SERPL W P-5'-P-CCNC: 21 U/L (ref 0–45)
BILIRUB DIRECT SERPL-MCNC: 0.1 MG/DL (ref 0–0.2)
BILIRUB SERPL-MCNC: 0.4 MG/DL (ref 0.2–1.3)
CRP SERPL-MCNC: 3 MG/L (ref 0–8)
FERRITIN SERPL-MCNC: 97 NG/ML (ref 26–388)
PROT SERPL-MCNC: 7.4 G/DL (ref 6.8–8.8)

## 2019-06-30 RX ORDER — CLINDAMYCIN PHOSPHATE 10 UG/ML
LOTION TOPICAL 2 TIMES DAILY
Qty: 60 ML | Refills: 11 | Status: SHIPPED | OUTPATIENT
Start: 2019-06-30 | End: 2019-09-11

## 2019-07-05 DIAGNOSIS — M06.1 ADULT-ONSET STILL'S DISEASE (H): ICD-10-CM

## 2019-07-05 PROCEDURE — 82728 ASSAY OF FERRITIN: CPT | Performed by: INTERNAL MEDICINE

## 2019-07-05 PROCEDURE — 80076 HEPATIC FUNCTION PANEL: CPT | Performed by: INTERNAL MEDICINE

## 2019-07-05 PROCEDURE — 86140 C-REACTIVE PROTEIN: CPT | Performed by: INTERNAL MEDICINE

## 2019-07-05 PROCEDURE — 36415 COLL VENOUS BLD VENIPUNCTURE: CPT | Performed by: INTERNAL MEDICINE

## 2019-07-06 LAB
ALBUMIN SERPL-MCNC: 4.3 G/DL (ref 3.4–5)
ALP SERPL-CCNC: 75 U/L (ref 40–150)
ALT SERPL W P-5'-P-CCNC: 32 U/L (ref 0–70)
AST SERPL W P-5'-P-CCNC: 20 U/L (ref 0–45)
BILIRUB DIRECT SERPL-MCNC: 0.2 MG/DL (ref 0–0.2)
BILIRUB SERPL-MCNC: 0.6 MG/DL (ref 0.2–1.3)
CRP SERPL-MCNC: <2.9 MG/L (ref 0–8)
FERRITIN SERPL-MCNC: 78 NG/ML (ref 26–388)
PROT SERPL-MCNC: 7.2 G/DL (ref 6.8–8.8)

## 2019-07-15 DIAGNOSIS — M06.1 ADULT-ONSET STILL'S DISEASE (H): ICD-10-CM

## 2019-07-15 PROCEDURE — 86140 C-REACTIVE PROTEIN: CPT | Performed by: INTERNAL MEDICINE

## 2019-07-15 PROCEDURE — 82728 ASSAY OF FERRITIN: CPT | Performed by: INTERNAL MEDICINE

## 2019-07-15 PROCEDURE — 80076 HEPATIC FUNCTION PANEL: CPT | Performed by: INTERNAL MEDICINE

## 2019-07-15 PROCEDURE — 36415 COLL VENOUS BLD VENIPUNCTURE: CPT | Performed by: INTERNAL MEDICINE

## 2019-07-16 LAB
ALBUMIN SERPL-MCNC: 4 G/DL (ref 3.4–5)
ALP SERPL-CCNC: 68 U/L (ref 40–150)
ALT SERPL W P-5'-P-CCNC: 31 U/L (ref 0–70)
AST SERPL W P-5'-P-CCNC: 18 U/L (ref 0–45)
BILIRUB DIRECT SERPL-MCNC: 0.1 MG/DL (ref 0–0.2)
BILIRUB SERPL-MCNC: 0.5 MG/DL (ref 0.2–1.3)
CRP SERPL-MCNC: <2.9 MG/L (ref 0–8)
FERRITIN SERPL-MCNC: 91 NG/ML (ref 26–388)
PROT SERPL-MCNC: 6.7 G/DL (ref 6.8–8.8)

## 2019-07-18 ENCOUNTER — TELEPHONE (OUTPATIENT)
Dept: RHEUMATOLOGY | Facility: CLINIC | Age: 34
End: 2019-07-18

## 2019-07-18 NOTE — TELEPHONE ENCOUNTER
Left generic message on voice mail asking that pt return call. Contact information provided within message.    ARTEM BuenoN RN  Rheumatology RN Coordinator  [facility]

## 2019-07-18 NOTE — TELEPHONE ENCOUNTER
Pt returned call and Dr Conte's recommendation was relayed to him. He said he is feeling fine, but will contact this clinic if he starts to develop any symptoms. Jose is appreciative that he can go every 2 weeks for his labs. Verified that orders are available in his chart.    Pt did not have any questions or concerns at this time.    ARTEM BuenoN RN  Rheumatology RN Coordinator  [facility]

## 2019-07-18 NOTE — TELEPHONE ENCOUNTER
----- Message from Odessa Luna RN sent at 7/18/2019 12:10 PM CDT -----      ----- Message -----  From: Rome Conte MD  Sent: 7/18/2019  10:32 AM  To: Odessa Luna RN, Adult Rheum Triage-[facility]    His labs continue fine.  If he continues to feel fine he can back off and do the labs every 2 weeks now.

## 2019-07-30 DIAGNOSIS — M08.20 STILL'S DISEASE (H): ICD-10-CM

## 2019-07-31 ENCOUNTER — TELEPHONE (OUTPATIENT)
Dept: RHEUMATOLOGY | Facility: CLINIC | Age: 34
End: 2019-07-31

## 2019-07-31 NOTE — TELEPHONE ENCOUNTER
[facility] Call Center    Phone Message    May a detailed message be left on voicemail: yes    Reason for Call: Medication Refill Request    Has the patient contacted the pharmacy for the refill? Yes   Name of medication being requested: anakinra (KINERET) 100 MG/0.67ML SOSY injection  Provider who prescribed the medication: Dr. Rome Conte  Pharmacy: [facility] in [facility]--PHARMACY IS CALLING AGAIN, THEY HAVE RECEIVED NO RESPONSE FROM OUR CLINIC ON THE RX REQUEST.  CALL [phone], [phone], YOU MAY SPEAK TO ANYONE ON THE TEAM WHO ANSWER YOUR CALL. Thank you.  Date medication is needed: soon        Action Taken: Message routed to:  [facility] ([facility] Med Refill Team for  Rheumatology

## 2019-08-01 DIAGNOSIS — M06.1 ADULT-ONSET STILL'S DISEASE (H): ICD-10-CM

## 2019-08-01 PROCEDURE — 86140 C-REACTIVE PROTEIN: CPT | Performed by: INTERNAL MEDICINE

## 2019-08-01 PROCEDURE — 36415 COLL VENOUS BLD VENIPUNCTURE: CPT | Performed by: INTERNAL MEDICINE

## 2019-08-01 PROCEDURE — 80076 HEPATIC FUNCTION PANEL: CPT | Performed by: INTERNAL MEDICINE

## 2019-08-01 PROCEDURE — 82728 ASSAY OF FERRITIN: CPT | Performed by: INTERNAL MEDICINE

## 2019-08-02 LAB
ALBUMIN SERPL-MCNC: 4.6 G/DL (ref 3.4–5)
ALP SERPL-CCNC: 73 U/L (ref 40–150)
ALT SERPL W P-5'-P-CCNC: 34 U/L (ref 0–70)
AST SERPL W P-5'-P-CCNC: 16 U/L (ref 0–45)
BILIRUB DIRECT SERPL-MCNC: 0.1 MG/DL (ref 0–0.2)
BILIRUB SERPL-MCNC: 0.6 MG/DL (ref 0.2–1.3)
CRP SERPL-MCNC: <2.9 MG/L (ref 0–8)
FERRITIN SERPL-MCNC: 134 NG/ML (ref 26–388)
PROT SERPL-MCNC: 7.6 G/DL (ref 6.8–8.8)

## 2019-08-06 ENCOUNTER — TELEPHONE (OUTPATIENT)
Dept: RHEUMATOLOGY | Facility: CLINIC | Age: 34
End: 2019-08-06

## 2019-08-06 NOTE — TELEPHONE ENCOUNTER
----- Message from Rome Conte MD sent at 8/6/2019  1:32 PM CDT -----  Everything is still good. If he is asymptomatic he can repeat these labs in 6 weeks.

## 2019-08-06 NOTE — TELEPHONE ENCOUNTER
Pt had reviewed the results and message from Dr Conte. Left message on pt's voice mail asking that he return call to discuss how he is feeling.      Pt's mother returned call on Jose's behalf as he is out camping. She did report he came down with something-nausea, diarrhea, fever one night. Pt is to be returning home on 8/7/19. Asked that she have pt call in so that we can touch base. She is agreeable to do this.    ARTEM BuenoN RN  Rheumatology RN Coordinator  [facility]

## 2019-08-08 NOTE — TELEPHONE ENCOUNTER
"Pt returned call. He did get home last evening from his camping trip in [place]. On Monday his son came down with a fever and not feeling well. That evening pt became nauseated-close to vomiting a couple of times, diarrhea-constant, pure liquid, fever-x 1, Monday night, shakes, and body aches.On Tuesday the body aches improved but he continued with the nausea and diarrhea.    Jose reports he feels 60 % of normal, his last diarrhea stool was 5.5 hours ago - did \"chunks\" of stool in it. He did say that everyone he traveled with are getting the same symptoms now. He is eating now.    Discussed with Dr Conte. Pt needs to be seen by his primary care provider and be evaluated for a waterborne illness. Contacted pt and relayed this recommendation. He said he will call and get an appointment and is agreeable to let us know the outcome.    ARTEM BuenoN RN  Rheumatology RN Coordinator  [facility]    "

## 2019-08-30 DIAGNOSIS — M06.1 ADULT-ONSET STILL'S DISEASE (H): ICD-10-CM

## 2019-08-30 LAB
ALBUMIN SERPL-MCNC: 4.5 G/DL (ref 3.4–5)
ALP SERPL-CCNC: 68 U/L (ref 40–150)
ALT SERPL W P-5'-P-CCNC: 31 U/L (ref 0–70)
AST SERPL W P-5'-P-CCNC: 18 U/L (ref 0–45)
BILIRUB DIRECT SERPL-MCNC: 0.1 MG/DL (ref 0–0.2)
BILIRUB SERPL-MCNC: 0.7 MG/DL (ref 0.2–1.3)
CRP SERPL-MCNC: <2.9 MG/L (ref 0–8)
FERRITIN SERPL-MCNC: 135 NG/ML (ref 26–388)
PROT SERPL-MCNC: 7.6 G/DL (ref 6.8–8.8)

## 2019-08-30 PROCEDURE — 82728 ASSAY OF FERRITIN: CPT | Performed by: INTERNAL MEDICINE

## 2019-08-30 PROCEDURE — 36415 COLL VENOUS BLD VENIPUNCTURE: CPT | Performed by: INTERNAL MEDICINE

## 2019-08-30 PROCEDURE — 86140 C-REACTIVE PROTEIN: CPT | Performed by: INTERNAL MEDICINE

## 2019-08-30 PROCEDURE — 80076 HEPATIC FUNCTION PANEL: CPT | Performed by: INTERNAL MEDICINE

## 2019-09-03 ENCOUNTER — TELEPHONE (OUTPATIENT)
Dept: RHEUMATOLOGY | Facility: CLINIC | Age: 34
End: 2019-09-03

## 2019-09-03 NOTE — TELEPHONE ENCOUNTER
[facility] Call Center    Phone Message    May a detailed message be left on voicemail: yes    Reason for Call: Medication Question or concern regarding medication   Prescription Clarification  Name of Medication: Kineret 100mg syringer  Prescribing Provider: Dr. Conte   Pharmacy:[facility]   What on the order needs clarification? Pharmacy is calling to verify if the patient is no longer taking this medication and if they can go ahead and discontinue the prescription? Please call them at [phone] to clarify. Thanks.      Action Taken: Message routed to:  [facility] ([facility]): rheum

## 2019-09-04 NOTE — TELEPHONE ENCOUNTER
He wanted to see how he would do off of drug and so far he has done great.    Please contact him and make sure he is still doing well. If he is he may not need this for the foreseeable future.    He should be aware however that the prescription will be discontinued at this point and we would need to go through the prior authorization process again.

## 2019-09-05 NOTE — TELEPHONE ENCOUNTER
9/5/2019  12:22 PM      RN Care Coordinator Encounter   Attempted to connect with patient for further updates as requested below by Dr. Conte. No answer. LVM for patient to please contact the Rheumatology clinic directly at [phone]; follow the prompts to reach the clinic and ask to speak with a nurse. This RN CC will also send patient a [employer] message for further f/u.           Odessa Luna RN, BSN, PHN  [facility]  RN Care Coordinator Medicine Specialty Pool Nurse   (Nephrology, Rheumatology, Infectious Disease & Hepatology)

## 2019-09-06 NOTE — TELEPHONE ENCOUNTER
9/6/2019  1:27 PM      RN Care Coordinator Encounter   Contacted [facility] and spoke with [facility] representative, for further f/u on the discontinuation of the Kineret. Per patient [employer] message (see Epic notes from 9/5/19), patient would like to discontinue the Kineret as he is doing well without this injection. Dr. Conte approved discontinuation if patient was in agreement and understanding of needing another PA for reinstatement of the Kineret if patient were to chage his mind. This RN CC, will also discontinue medication in Epic.         Odessa Luna RN, BSN, PHN  [facility]  RN Care Coordinator Medicine Specialty Pool Nurse   (Nephrology, Rheumatology, Infectious Disease & Hepatology)

## 2019-09-11 ENCOUNTER — OFFICE VISIT (OUTPATIENT)
Dept: FAMILY MEDICINE | Facility: CLINIC | Age: 34
End: 2019-09-11
Payer: COMMERCIAL

## 2019-09-11 VITALS
DIASTOLIC BLOOD PRESSURE: 84 MMHG | HEIGHT: 71 IN | SYSTOLIC BLOOD PRESSURE: 128 MMHG | TEMPERATURE: 98.3 F | RESPIRATION RATE: 16 BRPM | WEIGHT: 222 LBS | OXYGEN SATURATION: 99 % | HEART RATE: 65 BPM | BODY MASS INDEX: 31.08 KG/M2

## 2019-09-11 DIAGNOSIS — H90.72 MIXED CONDUCTIVE AND SENSORINEURAL HEARING LOSS OF LEFT EAR WITH UNRESTRICTED HEARING OF RIGHT EAR: ICD-10-CM

## 2019-09-11 DIAGNOSIS — E66.811 CLASS 1 OBESITY DUE TO EXCESS CALORIES WITHOUT SERIOUS COMORBIDITY WITH BODY MASS INDEX (BMI) OF 30.0 TO 30.9 IN ADULT: ICD-10-CM

## 2019-09-11 DIAGNOSIS — Z00.00 ROUTINE GENERAL MEDICAL EXAMINATION AT A HEALTH CARE FACILITY: Primary | ICD-10-CM

## 2019-09-11 DIAGNOSIS — E66.09 CLASS 1 OBESITY DUE TO EXCESS CALORIES WITHOUT SERIOUS COMORBIDITY WITH BODY MASS INDEX (BMI) OF 30.0 TO 30.9 IN ADULT: ICD-10-CM

## 2019-09-11 DIAGNOSIS — M06.1 ADULT-ONSET STILL'S DISEASE (H): ICD-10-CM

## 2019-09-11 DIAGNOSIS — Q78.0 OSTEOGENESIS IMPERFECTA: ICD-10-CM

## 2019-09-11 PROBLEM — R50.9 FEVER AND CHILLS: Status: RESOLVED | Noted: 2019-01-02 | Resolved: 2019-09-11

## 2019-09-11 PROBLEM — R53.1 WEAKNESS: Status: RESOLVED | Noted: 2018-12-23 | Resolved: 2019-09-11

## 2019-09-11 LAB
CHOLEST SERPL-MCNC: 173 MG/DL
GLUCOSE SERPL-MCNC: 94 MG/DL (ref 70–99)
HDLC SERPL-MCNC: 44 MG/DL
LDLC SERPL CALC-MCNC: 106 MG/DL
NONHDLC SERPL-MCNC: 129 MG/DL
TRIGL SERPL-MCNC: 115 MG/DL

## 2019-09-11 PROCEDURE — 90686 IIV4 VACC NO PRSV 0.5 ML IM: CPT | Performed by: FAMILY MEDICINE

## 2019-09-11 PROCEDURE — 99395 PREV VISIT EST AGE 18-39: CPT | Mod: 25 | Performed by: FAMILY MEDICINE

## 2019-09-11 PROCEDURE — 82947 ASSAY GLUCOSE BLOOD QUANT: CPT | Performed by: FAMILY MEDICINE

## 2019-09-11 PROCEDURE — 36415 COLL VENOUS BLD VENIPUNCTURE: CPT | Performed by: FAMILY MEDICINE

## 2019-09-11 PROCEDURE — 80061 LIPID PANEL: CPT | Performed by: FAMILY MEDICINE

## 2019-09-11 PROCEDURE — 90471 IMMUNIZATION ADMIN: CPT | Performed by: FAMILY MEDICINE

## 2019-09-11 ASSESSMENT — ENCOUNTER SYMPTOMS
ABDOMINAL PAIN: 0
FEVER: 0
ARTHRALGIAS: 0
CHILLS: 0
NERVOUS/ANXIOUS: 0
MYALGIAS: 0
SHORTNESS OF BREATH: 0
HEMATURIA: 0
EYE PAIN: 0
DIZZINESS: 0
HEARTBURN: 0
WEAKNESS: 0
DIARRHEA: 0
HEADACHES: 0
COUGH: 0
PARESTHESIAS: 0
PALPITATIONS: 0
SORE THROAT: 0
JOINT SWELLING: 0
HEMATOCHEZIA: 0
DYSURIA: 0
NAUSEA: 0
CONSTIPATION: 0
FREQUENCY: 0

## 2019-09-11 ASSESSMENT — MIFFLIN-ST. JEOR: SCORE: 1974.12

## 2019-09-11 NOTE — PATIENT INSTRUCTIONS
Preventive Health Recommendations  Male Ages 26 - 39    Yearly exam:             See your health care provider every year in order to  o   Review health changes.   o   Discuss preventive care.    o   Review your medicines if your doctor has prescribed any.    You should be tested each year for STDs (sexually transmitted diseases), if you re at risk.     After age 35, talk to your provider about cholesterol testing. If you are at risk for heart disease, have your cholesterol tested at least every 5 years.     If you are at risk for diabetes, you should have a diabetes test (fasting glucose).  Shots: Get a flu shot each year. Get a tetanus shot every 10 years.     Nutrition:    Eat at least 5 servings of fruits and vegetables daily.     Eat whole-grain bread, whole-wheat pasta and brown rice instead of white grains and rice.     Get adequate Calcium and Vitamin D.     Lifestyle    Exercise for at least 150 minutes a week (30 minutes a day, 5 days a week). This will help you control your weight and prevent disease.     Limit alcohol to one drink per day.     No smoking.     Wear sunscreen to prevent skin cancer.     See your dentist every six months for an exam and cleaning.     Use Hydrocortisone cream over the counter 1%, twice daily for rash in the groin area.

## 2019-09-11 NOTE — LETTER
[facility]  [address] [place], [address]  [phone]        September 11, 2019    Jose Spears                                                                                                                                                       [id number] [facility] [address]          Jordan Garcia,    Your cholesterol is very good, and your blood sugar is normal.  We will fill your form and fax it to Kelly Allen MD      Results for orders placed or performed in visit on 09/11/19   Lipid panel reflex to direct LDL Fasting   Result Value Ref Range    Cholesterol 173 <200 mg/dL    Triglycerides 115 <150 mg/dL    HDL Cholesterol 44 >39 mg/dL    LDL Cholesterol Calculated 106 (H) <100 mg/dL    Non HDL Cholesterol 129 <130 mg/dL   Glucose   Result Value Ref Range    Glucose 94 70 - 99 mg/dL

## 2019-09-11 NOTE — PROGRESS NOTES
SUBJECTIVE:   CC: Jose Spears is an 34 year old male who presents for preventative health visit.     Healthy Habits:     Getting at least 3 servings of Calcium per day:  NO    Bi-annual eye exam:  Yes    Dental care twice a year:  Yes    Sleep apnea or symptoms of sleep apnea:  None    Diet:  Regular (no restrictions)    Frequency of exercise:  1 day/week    Duration of exercise:  Less than 15 minutes    Taking medications regularly:  Yes    Medication side effects:  Not applicable    PHQ-2 Total Score: 0    Additional concerns today:  No              Today's PHQ-2 Score:   PHQ-2 ( 1999 [employer]) 9/11/2019   Q1: Little interest or pleasure in doing things 0   Q2: Feeling down, depressed or hopeless 0   PHQ-2 Score 0   Q1: Little interest or pleasure in doing things Not at all   Q2: Feeling down, depressed or hopeless Not at all   PHQ-2 Score 0       Abuse: Current or Past(Physical, Sexual or Emotional)- No  Do you feel safe in your environment? Yes    Social History     Tobacco Use     Smoking status: Current Every Day Smoker     Packs/day: 1.00     Types: Cigarettes     Smokeless tobacco: Never Used   Substance Use Topics     Alcohol use: Yes     Comment: occasionally         Alcohol Use 9/11/2019   Prescreen: >3 drinks/day or >7 drinks/week? No   Prescreen: >3 drinks/day or >7 drinks/week? -       Last PSA: No results found for: PSA    Reviewed orders with patient. Reviewed health maintenance and updated orders accordingly - Yes  Lab work is in process  Labs reviewed in EPIC  BP Readings from Last 3 Encounters:   09/11/19 128/84   06/15/19 125/76   04/23/19 129/80    Wt Readings from Last 3 Encounters:   09/11/19 100.7 kg (222 lb)   06/15/19 101 kg (222 lb 9.6 oz)   04/23/19 99.7 kg (219 lb 14.4 oz)                  Patient Active Problem List   Diagnosis     Osteogenesis imperfecta     Tobacco abuse     External hemorrhoids     Adult-onset Still's disease (H)     Mixed conductive and sensorineural hearing  loss of left ear     Past Surgical History:   Procedure Laterality Date     ORTHOPEDIC SURGERY         Social History     Tobacco Use     Smoking status: Current Every Day Smoker     Packs/day: 1.00     Types: Cigarettes     Smokeless tobacco: Never Used   Substance Use Topics     Alcohol use: Yes     Comment: occasionally     Family History   Problem Relation Age of Onset     Genetic Disorder Father         OI     Diabetes Maternal Grandmother      Hypertension Maternal Grandmother      Cerebrovascular Disease Maternal Grandmother      Diabetes Maternal Aunt      Cancer - colorectal Maternal Grandfather      Melanoma No family hx of      Skin Cancer No family hx of          No current outpatient medications on file.     Allergies   Allergen Reactions     Codeine Nausea and Vomiting     Morphine Nausea and Vomiting       Reviewed and updated as needed this visit by clinical staff  Tobacco  Allergies  Med Hx  Surg Hx  Fam Hx  Soc Hx        Reviewed and updated as needed this visit by Provider        Past Medical History:   Diagnosis Date     Osteogenesis imperfecta       Past Surgical History:   Procedure Laterality Date     ORTHOPEDIC SURGERY         Review of Systems   Constitutional: Negative for chills and fever.   HENT: Negative for congestion, ear pain, hearing loss and sore throat.    Eyes: Negative for pain and visual disturbance.   Respiratory: Negative for cough and shortness of breath.    Cardiovascular: Negative for chest pain, palpitations and peripheral edema.   Gastrointestinal: Negative for abdominal pain, constipation, diarrhea, heartburn, hematochezia and nausea.   Genitourinary: Negative for discharge, dysuria, frequency, genital sores, hematuria, impotence and urgency.   Musculoskeletal: Negative for arthralgias, joint swelling and myalgias.   Skin: Negative for rash.   Neurological: Negative for dizziness, weakness, headaches and paresthesias.   Psychiatric/Behavioral: Negative for mood  "changes. The patient is not nervous/anxious.      Pt has been smoking 15 years, he smokes about 1 pack day, and quitted for 3 years up to 2 years.    OBJECTIVE:   /84 (BP Location: Right arm, Patient Position: Chair, Cuff Size: Adult Large)   Pulse 65   Temp 98.3  F (36.8  C) (Oral)   Resp 16   Ht 1.803 m (5' 11\")   Wt 100.7 kg (222 lb)   SpO2 99%   BMI 30.96 kg/m      Physical Exam  GENERAL: healthy, alert and no distress  EYES: Eyes grossly normal to inspection, PERRL and conjunctivae and sclerae normal  HENT: ear canals and TM's normal, nose and mouth without ulcers or lesions  NECK: no adenopathy, no asymmetry, masses, or scars and thyroid normal to palpation  RESP: lungs clear to auscultation - no rales, rhonchi or wheezes  CV: regular rate and rhythm, normal S1 S2, no S3 or S4, no murmur, click or rub, no peripheral edema and peripheral pulses strong  ABDOMEN: soft, nontender, no hepatosplenomegaly, no masses and bowel sounds normal  MS: no gross musculoskeletal defects noted, no edema  SKIN: no suspicious lesions or rashes  NEURO: Normal strength and tone, mentation intact and speech normal  PSYCH: mentation appears normal, affect normal/bright        ASSESSMENT/PLAN:   1. Routine general medical examination at a health care facility  Today I counseled the patient about diet, regular exercise and weight loss planning.    - Lipid panel reflex to direct LDL Fasting  - Glucose    2. Adult-onset Still's disease (H)  Resolved, continue on following up with rheumatology     3. Osteogenesis imperfecta  Stable.    4. Mixed conductive and sensorineural hearing loss of left ear with unrestricted hearing of right ear        COUNSELING:   Reviewed preventive health counseling, as reflected in patient instructions       Regular exercise       Healthy diet/nutrition    Estimated body mass index is 30.96 kg/m  as calculated from the following:    Height as of this encounter: 1.803 m (5' 11\").    Weight as of " this encounter: 100.7 kg (222 lb).     Weight management plan: Discussed healthy diet and exercise guidelines     reports that he has been smoking cigarettes.  He has been smoking about 1.00 pack per day. He has never used smokeless tobacco.  Tobacco Cessation Action Plan: Information offered: Patient not interested at this time    Counseling Resources:  ATP IV Guidelines  Pooled Cohorts Equation Calculator  FRAX Risk Assessment  ICSI Preventive Guidelines  Dietary Guidelines for Americans, 2010  USDA's MyPlate  ASA Prophylaxis  Lung CA Screening    Lam Allen MD  [facility]

## 2019-09-23 DIAGNOSIS — M06.1 ADULT-ONSET STILL'S DISEASE (H): ICD-10-CM

## 2019-09-23 PROCEDURE — 36415 COLL VENOUS BLD VENIPUNCTURE: CPT | Performed by: INTERNAL MEDICINE

## 2019-09-23 PROCEDURE — 80076 HEPATIC FUNCTION PANEL: CPT | Performed by: INTERNAL MEDICINE

## 2019-09-23 PROCEDURE — 86140 C-REACTIVE PROTEIN: CPT | Performed by: INTERNAL MEDICINE

## 2019-09-23 PROCEDURE — 82728 ASSAY OF FERRITIN: CPT | Performed by: INTERNAL MEDICINE

## 2019-09-24 LAB
ALBUMIN SERPL-MCNC: 4.5 G/DL (ref 3.4–5)
ALP SERPL-CCNC: 73 U/L (ref 40–150)
ALT SERPL W P-5'-P-CCNC: 34 U/L (ref 0–70)
AST SERPL W P-5'-P-CCNC: 19 U/L (ref 0–45)
BILIRUB DIRECT SERPL-MCNC: <0.1 MG/DL (ref 0–0.2)
BILIRUB SERPL-MCNC: 0.3 MG/DL (ref 0.2–1.3)
CRP SERPL-MCNC: 6.9 MG/L (ref 0–8)
FERRITIN SERPL-MCNC: 145 NG/ML (ref 26–388)
PROT SERPL-MCNC: 7.4 G/DL (ref 6.8–8.8)

## 2019-09-29 ENCOUNTER — HEALTH MAINTENANCE LETTER (OUTPATIENT)
Age: 34
End: 2019-09-29

## 2019-10-22 ENCOUNTER — OFFICE VISIT (OUTPATIENT)
Dept: RHEUMATOLOGY | Facility: CLINIC | Age: 34
End: 2019-10-22
Attending: INTERNAL MEDICINE
Payer: COMMERCIAL

## 2019-10-22 VITALS
DIASTOLIC BLOOD PRESSURE: 79 MMHG | TEMPERATURE: 98.2 F | WEIGHT: 226.3 LBS | SYSTOLIC BLOOD PRESSURE: 116 MMHG | OXYGEN SATURATION: 97 % | BODY MASS INDEX: 31.56 KG/M2 | HEART RATE: 84 BPM

## 2019-10-22 DIAGNOSIS — Z51.81 MEDICATION MONITORING ENCOUNTER: ICD-10-CM

## 2019-10-22 DIAGNOSIS — M08.20 STILL'S DISEASE (H): ICD-10-CM

## 2019-10-22 DIAGNOSIS — M06.1 ADULT-ONSET STILL'S DISEASE (H): Primary | ICD-10-CM

## 2019-10-22 DIAGNOSIS — M06.1 ADULT-ONSET STILL'S DISEASE (H): ICD-10-CM

## 2019-10-22 LAB
ALBUMIN SERPL-MCNC: 3.8 G/DL (ref 3.4–5)
ALP SERPL-CCNC: 68 U/L (ref 40–150)
ALT SERPL W P-5'-P-CCNC: 32 U/L (ref 0–70)
AST SERPL W P-5'-P-CCNC: 18 U/L (ref 0–45)
BILIRUB DIRECT SERPL-MCNC: <0.1 MG/DL (ref 0–0.2)
BILIRUB SERPL-MCNC: 0.4 MG/DL (ref 0.2–1.3)
CRP SERPL-MCNC: 4.8 MG/L (ref 0–8)
FERRITIN SERPL-MCNC: 96 NG/ML (ref 26–388)
PROT SERPL-MCNC: 6.8 G/DL (ref 6.8–8.8)

## 2019-10-22 PROCEDURE — 80076 HEPATIC FUNCTION PANEL: CPT | Performed by: INTERNAL MEDICINE

## 2019-10-22 PROCEDURE — 36415 COLL VENOUS BLD VENIPUNCTURE: CPT | Performed by: INTERNAL MEDICINE

## 2019-10-22 PROCEDURE — G0463 HOSPITAL OUTPT CLINIC VISIT: HCPCS | Mod: ZF

## 2019-10-22 PROCEDURE — 86140 C-REACTIVE PROTEIN: CPT | Performed by: INTERNAL MEDICINE

## 2019-10-22 PROCEDURE — 82728 ASSAY OF FERRITIN: CPT | Performed by: INTERNAL MEDICINE

## 2019-10-22 ASSESSMENT — PAIN SCALES - GENERAL: PAINLEVEL: NO PAIN (0)

## 2019-10-22 NOTE — NURSING NOTE
Chief Complaint   Patient presents with     RECHECK     Adult-onset Still's disease       /79 (BP Location: Right arm, Patient Position: Sitting, Cuff Size: Adult Regular)   Pulse 84   Temp 98.2  F (36.8  C) (Oral)   Wt 102.6 kg (226 lb 4.8 oz)   SpO2 97%   BMI 31.56 kg/m      Whitney Plummer CMA CMA    10/22/2019 4:55 PM

## 2019-10-22 NOTE — PROGRESS NOTES
[facility]  Rheumatology Clinic  Rome Conte MD  10/22/2019     Name: Jose Spears  MRN: [id number]  Age: 34 year old  : 1985  Referring provider: Referred Self    Problem List:  Adult-onset Still's disease    Assessment and Plan:  The patient has been well since stopping interleukin-1 inhibition in April until last night when he developed rashes on his forearms and legs. He having a recurrence and is likely going to need to start anakinra shortly, so we will prescribe this. He will let us know if this medication does not provide sufficient relief as it did with previous use.     Orders:  - anakinra (KINERET) 100 MG/0.67ML SOSY injection Dispense: 30 Syringe; Refill: 5    Follow-up: Around 6 months.    HPI:   Jose Spears is a 34 year old male with a history including adult-onset Still's disease on anakinra who presents for follow-up. I last evaluated the patient on 06/15/2019, at which time the patient had not experienced any symptom flares while off of anakinra. We decided to hold off on anakinra and have him complete weekly CRP, hepatic panel, and ferritin levels weekly.    Today, the patient reports that he again began to develop rashes on his forearms and legs last night. He states that he has been off of interleukin-1 inhibition since April. Since that time, he has not had any fevers or weakness.     He states that, other than his rash, he feels well overall. He notes that he does have around a two month's supply of anakinra in his refrigerator.     Review of Systems:   Pertinent items are noted in HPI or as below, remainder of complete ROS is negative.      No recent problems with hearing or vision. No swallowing problems.   No breathing difficulty, shortness of breath, coughing, or wheezing.  No chest pain or palpitations.  No heart burn, indigestion, abdominal pain, nausea, vomiting, diarrhea.  No urination problems, no bloody, cloudy urine, no dysuria.  No numbing, tingling,  weakness.  No headaches or confusion.  No easy bleeding or bruising.     Active Medications:     Current Outpatient Medications:      anakinra (KINERET) 100 MG/0.67ML SOSY injection, Inject 0.67 mLs (100 mg) Subcutaneous daily Hold for signs of infection, then seek medical attention., Disp: 30 Syringe, Rfl: 5     Allergies:   Codeine and Morphine      Past Medical History:  Adult-onset Still's disease  Osteogenesis imperfecta  External hemorrhoids  Tobacco abuse     Past Surgical History:  Orthopedic surgery     Family History:   Osteogenesis imperfecta - father  Diabetes - maternal grandmother  Hypertension - maternal grandmother  Stroke - maternal grandmother  Diabetes - maternal aunt  Colorectal cancer - maternal grandfather     Social History:  The patient reports that he has been smoking cigarettes. He has been smoking about 1.00 pack per day. He has never used smokeless tobacco. He reports current alcohol use. He reports that he does not use drugs.   Marital Status: Single     Physical Exam:   /79 (BP Location: Right arm, Patient Position: Sitting, Cuff Size: Adult Regular)   Pulse 84   Temp 98.2  F (36.8  C) (Oral)   Wt 102.6 kg (226 lb 4.8 oz)   SpO2 97%   BMI 31.56 kg/m     Wt Readings from Last 4 Encounters:   10/22/19 102.6 kg (226 lb 4.8 oz)   09/11/19 100.7 kg (222 lb)   06/15/19 101 kg (222 lb 9.6 oz)   04/23/19 99.7 kg (219 lb 14.4 oz)     Some evidence of low-grade rashes over his forearms, neck, and face today, but the remainder of the exam was deferred.     Laboratory:   RHEUM RESULTS Latest Ref Rng & Units 8/30/2019 9/23/2019 10/22/2019   SED RATE 0 - 15 mm/h - - -   CRP, INFLAMMATION 0.0 - 8.0 mg/L <2.9 6.9 4.8   CK TOTAL 30 - 300 U/L - - -   RHEUMATOID FACTOR <20 IU/mL - - -   AST 0 - 45 U/L 18 19 18   ALT 0 - 70 U/L 31 34 32   ALBUMIN 3.4 - 5.0 g/dL 4.5 4.5 3.8   WBC 4.0 - 11.0 10e9/L - - -   RBC 4.4 - 5.9 10e12/L - - -   HGB 13.3 - 17.7 g/dL - - -   HCT 40.0 - 53.0 % - - -   MCV 78  - 100 fl - - -   MCHC 31.5 - 36.5 g/dL - - -   RDW 10.0 - 15.0 % - - -    - 450 10e9/L - - -   CREATININE 0.66 - 1.25 mg/dL - - -   GFR ESTIMATE, IF BLACK >60 mL/min/[1.73:m2] - - -   GFR ESTIMATE >60 mL/min/[1.73:m2] - - -    - 1,620 mg/dL - - -   IGA 70 - 380 mg/dL - - -   IGM 60 - 265 mg/dL - - -     Rheumatoid Factor   Date Value Ref Range Status   01/02/2019 <20 <20 IU/mL Final     Cyclic Citrullinated Peptide Antibody, IgG   Date Value Ref Range Status   01/02/2019 1 <7 U/mL Final     Comment:     Negative     SSA (Ro) (ISAAC) Antibody, IgG   Date Value Ref Range Status   12/25/2018 <0.2 0.0 - 0.9 AI Final     Comment:     Negative  Antibody index (AI) values reflect qualitative changes in antibody   concentration that cannot be directly associated with clinical condition or   disease state.       SSB (La) (ISAAC) Antibody, IgG   Date Value Ref Range Status   12/25/2018 <0.2 0.0 - 0.9 AI Final     Comment:     Negative  Antibody index (AI) values reflect qualitative changes in antibody   concentration that cannot be directly associated with clinical condition or   disease state.       RODRIGO interpretation   Date Value Ref Range Status   01/02/2019 Negative NEG^Negative Final     Comment:                                        Reference range:  <1:40  NEGATIVE  1:40 - 1:80  BORDERLINE POSITIVE  >1:80 POSITIVE       Proteinase 3 Antibody IgG   Date Value Ref Range Status   12/25/2018 <0.2 0.0 - 0.9 AI Final     Comment:     Negative  Antibody index (AI) values reflect qualitative changes in antibody   concentration that cannot be directly associated with clinical condition or   disease state.       Myeloperoxidase Antibody IgG   Date Value Ref Range Status   12/25/2018 <0.2 0.0 - 0.9 AI Final     Comment:     Negative  Antibody index (AI) values reflect qualitative changes in antibody   concentration that cannot be directly associated with clinical condition or   disease state.       ,  ,  ,  ,  ,  ,  ,   ,  ,   Immunofixation ELP   Date Value Ref Range Status   12/23/2018   Final    No monoclonal protein seen on immunofixation.  Pathological significance requires clinical   correlation.       Comment:     Dion Toscano M.D., Ph.D.     IGG   Date Value Ref Range Status   12/23/2018 638 (L) 695 - 1,620 mg/dL Final     IGA   Date Value Ref Range Status   12/23/2018 55 (L) 70 - 380 mg/dL Final     IGM   Date Value Ref Range Status   12/23/2018 109 60 - 265 mg/dL Final     Scribe Disclosure:  I, Sanjeev Small, am serving as a scribe to document services personally performed by Rome Conte MD at this visit, based upon the provider's statements to me. All documentation has been reviewed by the aforementioned provider prior to being entered into the official medical record.    HORACIO Conte MD, PhD    Rheumatology

## 2019-10-22 NOTE — LETTER
10/22/2019      RE: Jose Spears  [address] [facility] [address]       [facility]  Rheumatology Clinic  Rome Conte MD  10/22/2019     Name: Jose Spears  MRN: [id number]  Age: 34 year old  : 1985  Referring provider: Referred Self    Problem List:  Adult-onset Still's disease    Assessment and Plan:  The patient has been well since stopping interleukin-1 inhibition in April until last night when he developed rashes on his forearms and legs. He having a recurrence and is likely going to need to start anakinra shortly, so we will prescribe this. He will let us know if this medication does not provide sufficient relief as it did with previous use.     Orders:  - anakinra (KINERET) 100 MG/0.67ML SOSY injection Dispense: 30 Syringe; Refill: 5    Follow-up: Around 6 months.    HPI:   Jose Spears is a 34 year old male with a history including adult-onset Still's disease on anakinra who presents for follow-up. I last evaluated the patient on 06/15/2019, at which time the patient had not experienced any symptom flares while off of anakinra. We decided to hold off on anakinra and have him complete weekly CRP, hepatic panel, and ferritin levels weekly.    Today, the patient reports that he again began to develop rashes on his forearms and legs last night. He states that he has been off of interleukin-1 inhibition since April. Since that time, he has not had any fevers or weakness.     He states that, other than his rash, he feels well overall. He notes that he does have around a two month's supply of anakinra in his refrigerator.     Review of Systems:   Pertinent items are noted in HPI or as below, remainder of complete ROS is negative.      No recent problems with hearing or vision. No swallowing problems.   No breathing difficulty, shortness of breath, coughing, or wheezing.  No chest pain or palpitations.  No heart burn, indigestion, abdominal pain, nausea, vomiting, diarrhea.  No  urination problems, no bloody, cloudy urine, no dysuria.  No numbing, tingling, weakness.  No headaches or confusion.  No easy bleeding or bruising.     Active Medications:     Current Outpatient Medications:      anakinra (KINERET) 100 MG/0.67ML SOSY injection, Inject 0.67 mLs (100 mg) Subcutaneous daily Hold for signs of infection, then seek medical attention., Disp: 30 Syringe, Rfl: 5     Allergies:   Codeine and Morphine      Past Medical History:  Adult-onset Still's disease  Osteogenesis imperfecta  External hemorrhoids  Tobacco abuse     Past Surgical History:  Orthopedic surgery     Family History:   Osteogenesis imperfecta - father  Diabetes - maternal grandmother  Hypertension - maternal grandmother  Stroke - maternal grandmother  Diabetes - maternal aunt  Colorectal cancer - maternal grandfather     Social History:  The patient reports that he has been smoking cigarettes. He has been smoking about 1.00 pack per day. He has never used smokeless tobacco. He reports current alcohol use. He reports that he does not use drugs.   Marital Status: Single     Physical Exam:   /79 (BP Location: Right arm, Patient Position: Sitting, Cuff Size: Adult Regular)   Pulse 84   Temp 98.2  F (36.8  C) (Oral)   Wt 102.6 kg (226 lb 4.8 oz)   SpO2 97%   BMI 31.56 kg/m      Wt Readings from Last 4 Encounters:   10/22/19 102.6 kg (226 lb 4.8 oz)   09/11/19 100.7 kg (222 lb)   06/15/19 101 kg (222 lb 9.6 oz)   04/23/19 99.7 kg (219 lb 14.4 oz)     Some evidence of low-grade rashes over his forearms, neck, and face today, but the remainder of the exam was deferred.     Laboratory:   RHEUM RESULTS Latest Ref Rng & Units 8/30/2019 9/23/2019 10/22/2019   SED RATE 0 - 15 mm/h - - -   CRP, INFLAMMATION 0.0 - 8.0 mg/L <2.9 6.9 4.8   CK TOTAL 30 - 300 U/L - - -   RHEUMATOID FACTOR <20 IU/mL - - -   AST 0 - 45 U/L 18 19 18   ALT 0 - 70 U/L 31 34 32   ALBUMIN 3.4 - 5.0 g/dL 4.5 4.5 3.8   WBC 4.0 - 11.0 10e9/L - - -   RBC 4.4 -  5.9 10e12/L - - -   HGB 13.3 - 17.7 g/dL - - -   HCT 40.0 - 53.0 % - - -   MCV 78 - 100 fl - - -   MCHC 31.5 - 36.5 g/dL - - -   RDW 10.0 - 15.0 % - - -    - 450 10e9/L - - -   CREATININE 0.66 - 1.25 mg/dL - - -   GFR ESTIMATE, IF BLACK >60 mL/min/[1.73:m2] - - -   GFR ESTIMATE >60 mL/min/[1.73:m2] - - -    - 1,620 mg/dL - - -   IGA 70 - 380 mg/dL - - -   IGM 60 - 265 mg/dL - - -     Rheumatoid Factor   Date Value Ref Range Status   01/02/2019 <20 <20 IU/mL Final     Cyclic Citrullinated Peptide Antibody, IgG   Date Value Ref Range Status   01/02/2019 1 <7 U/mL Final     Comment:     Negative     SSA (Ro) (ISAAC) Antibody, IgG   Date Value Ref Range Status   12/25/2018 <0.2 0.0 - 0.9 AI Final     Comment:     Negative  Antibody index (AI) values reflect qualitative changes in antibody   concentration that cannot be directly associated with clinical condition or   disease state.       SSB (La) (ISAAC) Antibody, IgG   Date Value Ref Range Status   12/25/2018 <0.2 0.0 - 0.9 AI Final     Comment:     Negative  Antibody index (AI) values reflect qualitative changes in antibody   concentration that cannot be directly associated with clinical condition or   disease state.       RODRIGO interpretation   Date Value Ref Range Status   01/02/2019 Negative NEG^Negative Final     Comment:                                        Reference range:  <1:40  NEGATIVE  1:40 - 1:80  BORDERLINE POSITIVE  >1:80 POSITIVE       Proteinase 3 Antibody IgG   Date Value Ref Range Status   12/25/2018 <0.2 0.0 - 0.9 AI Final     Comment:     Negative  Antibody index (AI) values reflect qualitative changes in antibody   concentration that cannot be directly associated with clinical condition or   disease state.       Myeloperoxidase Antibody IgG   Date Value Ref Range Status   12/25/2018 <0.2 0.0 - 0.9 AI Final     Comment:     Negative  Antibody index (AI) values reflect qualitative changes in antibody   concentration that cannot be directly  associated with clinical condition or   disease state.       ,  ,  ,  ,  ,  ,  ,  ,  ,   Immunofixation ELP   Date Value Ref Range Status   12/23/2018   Final    No monoclonal protein seen on immunofixation.  Pathological significance requires clinical   correlation.       Comment:     Dion Toscano M.D., Ph.D.     IGG   Date Value Ref Range Status   12/23/2018 638 (L) 695 - 1,620 mg/dL Final     IGA   Date Value Ref Range Status   12/23/2018 55 (L) 70 - 380 mg/dL Final     IGM   Date Value Ref Range Status   12/23/2018 109 60 - 265 mg/dL Final     Scribe Disclosure:  I, Sanjeev Small, am serving as a scribe to document services personally performed by Rome Cnote MD at this visit, based upon the provider's statements to me. All documentation has been reviewed by the aforementioned provider prior to being entered into the official medical record.    HORACIO Conte MD, PhD    Rheumatology

## 2020-02-14 ENCOUNTER — TELEPHONE (OUTPATIENT)
Dept: RHEUMATOLOGY | Facility: CLINIC | Age: 35
End: 2020-02-14

## 2020-02-14 NOTE — TELEPHONE ENCOUNTER
Pt called as he needs some help in figuring out how to afford his co-pay, as with his new insurance it is now $3900.  Will have [facility] connect with him on Monday.  If he does not hear from her, he will call her as I have provided him with her number.    Bria Gusman RN  Rheumatology Clinic

## 2020-02-17 NOTE — TELEPHONE ENCOUNTER
Phoned Jose - patient and I conference called Rachel and spoke to Jonh - inquired how to obtain copay assistance for high copay.  Jonh was able to email the copay assistance information to patient - patient verbalized understanding of next steps

## 2020-03-13 ENCOUNTER — MYC MEDICAL ADVICE (OUTPATIENT)
Dept: RHEUMATOLOGY | Facility: CLINIC | Age: 35
End: 2020-03-13

## 2020-03-13 ENCOUNTER — VIRTUAL VISIT (OUTPATIENT)
Dept: FAMILY MEDICINE | Facility: OTHER | Age: 35
End: 2020-03-13

## 2020-03-13 DIAGNOSIS — M06.1 ADULT-ONSET STILL'S DISEASE (H): Primary | ICD-10-CM

## 2020-03-13 DIAGNOSIS — R05.8 COUGH WITH SPUTUM: ICD-10-CM

## 2020-03-13 NOTE — TELEPHONE ENCOUNTER
His symptoms could be c/w COVID-19, recurrence of his AOSD, or a cold. Which, if any of those this is is extremely important to figure out. Determination of whether disability applies comes after.     The following needs happen;     1. He needs be instructed to go to one of the 4  or other health system drive through COVID -19 screening facilities and do COVID-19 screening. He should NOT seek other care or do other labs, unless he is so ill he needs to go to ER, until those results are back.     2. When those results are backand confirm negative , we need to do blood work for AOSD and a respiratory panel.    3. We can then decide on advising him on returning to work.

## 2020-03-13 NOTE — TELEPHONE ENCOUNTER
Called pt with Dr Conte's recommendation. Pt reports that since Tuesday he has been very fatigued, has back and leg pain that can get up to 4/10 on the pain scale. The back hurts most of the time, it might scale back but never goes away. He is also experiencing head congestion, cough that is productive of green/dark brown phlegm.  Has been checking temp and is running at 98.6, but is having chills. Denies shortness of breath, chest pain, and chest tightness.     Directed pt to go to [email or web address] and complete the corona Virus screening. He will be directed where to go to be tested. Advised him to go right back home after being tested and wait for the results. When he gets them he should call this clinic and speak with myself or Bria. Instructed pt that if his symptoms become worse, runs a temp, etc to go back to the [email or web address] site again complete the survey and it will tell pt where to report to. Jose verbalized understanding by repeating these instructions. Bria will be in clinic tomorrow am so she will watch for communication from the pt.     If they are negative Dr Conte would like pt to get a Ferritin, CBC/Plts/Diff, CRP, ESR, and a Resp Viral Panel Dr Conte asked that the order not be placed until we get a negative Corona Virus result.     I will touch base with pt on Monday.    ARTEM BuenoN RN  Rheumatology Care Coordinator  [facility]

## 2020-03-14 DIAGNOSIS — M06.1 ADULT-ONSET STILL'S DISEASE (H): ICD-10-CM

## 2020-03-14 LAB
ALBUMIN SERPL-MCNC: 4.1 G/DL (ref 3.4–5)
ALP SERPL-CCNC: 61 U/L (ref 40–150)
ALT SERPL W P-5'-P-CCNC: 44 U/L (ref 0–70)
AST SERPL W P-5'-P-CCNC: 16 U/L (ref 0–45)
BILIRUB DIRECT SERPL-MCNC: 0.1 MG/DL (ref 0–0.2)
BILIRUB SERPL-MCNC: 0.6 MG/DL (ref 0.2–1.3)
CRP SERPL-MCNC: 17 MG/L (ref 0–8)
FERRITIN SERPL-MCNC: 199 NG/ML (ref 26–388)
PROT SERPL-MCNC: 7.7 G/DL (ref 6.8–8.8)

## 2020-03-14 PROCEDURE — 80076 HEPATIC FUNCTION PANEL: CPT | Performed by: INTERNAL MEDICINE

## 2020-03-14 PROCEDURE — 36415 COLL VENOUS BLD VENIPUNCTURE: CPT | Performed by: INTERNAL MEDICINE

## 2020-03-14 PROCEDURE — 82728 ASSAY OF FERRITIN: CPT | Performed by: INTERNAL MEDICINE

## 2020-03-14 PROCEDURE — 86140 C-REACTIVE PROTEIN: CPT | Performed by: INTERNAL MEDICINE

## 2020-03-14 NOTE — PROGRESS NOTES
"Date: 2020 15:30:44  Clinician: Milton Chase  Clinician NPI: [id number]  Patient: Jose Spears  Patient : 1985  Patient Address: [address] [place], [place] [address]  Patient Phone: [phone]  Visit Protocol: URI  Patient Summary:  Jose is a 34 year old ( : 1985 ) male who initiated a Visit for COVID-19 (Coronavirus) evaluation and screening. When asked the question \"Please sign me up to receive news, health information and promotions. \", Jose responded \"No\".    Jose states his symptoms started suddenly 3-6 days ago. After his symptoms started, they improved and then got worse again.   His symptoms consist of chills, a cough, nasal congestion, tooth pain, malaise, a headache, facial pain or pressure, and myalgia. Jose also feels feverish.   Symptom details     Nasal secretions: The color of his mucus is green.    Cough: Jose coughs a few times an hour and his cough is not more bothersome at night. Phlegm comes into his throat when he coughs. He does not believe his cough is caused by post-nasal drip. The color of the phlegm is green and yellow.     Temperature: His current temperature is 97.7 degrees Fahrenheit.     Facial pain or pressure: The facial pain or pressure feels worse when bending over or leaning forward.     Headache: He states the headache is moderate (4-6 on a 10 point pain scale).     Tooth pain: The tooth pain is caused by a cavity, recent dental work, or other mouth problems.      Jose denies having wheezing, sore throat, ear pain, and rhinitis. He also denies taking antibiotic medication for the symptoms and having recent facial or sinus surgery in the past 60 days. He is not experiencing dyspnea.   Precipitating events  He has recently been exposed to someone with influenza. Jose has been in close contact with the following high risk individuals: immunocompromised people and children under the age of 5.   Pertinent COVID-19 (Coronavirus) " information  Jose has not traveled internationally or to the areas where COVID-19 (Coronavirus) is widespread in the last 14 days before the start of his symptoms.   Jose has not had close contact with a laboratory-confirmed positive COVID-19 patient or someone under quarantine for suspected COVID-19 within 14 days of symptom onset.   Jose is not a healthcare worker or does not work in a healthcare facility.   Pertinent medical history  Jose had 1 sinus infection within the past year.   Jose needs a return to work/school note.   Weight: 235 lbs   Jose smokes or uses smokeless tobacco.   Additional information as reported by the patient (free text): I have oi, and adult onset still disease.   Weight: 235 lbs    MEDICATIONS: anakinra subcutaneous, ALLERGIES: morphine  Clinician Response:  Dear Jose,  Based on the information provided, you have a viral upper respiratory infection, otherwise known as a cold. Symptoms vary from person to person, but can include sneezing, coughing, a runny nose, sore throat, and headache and range from mild to severe.  Unfortunately, there are no medications that can cure a cold, so treatment is focused on controlling symptoms as much as possible. Most people gradually feel better until symptoms are gone in 1-2 weeks.  Medication information  Because you have a viral infection, antibiotics will not help you get better. Treating a viral infection with antibiotics could actually make you feel worse.  Unless you are allergic to the over-the-counter medication(s) below, I recommend using:       Acetaminophen (Tylenol or store brand) oral tablet. Take 1-2 tablets by mouth every 4-6 hours to help with the discomfort.    An antihistamine such as Benadryl, Claritin, or store brand.      Guaifenesin + dextromethorphan (Robitussin DM, Mucinex DM, or store brand).     Over-the-counter medications do not require a prescription. Ask the pharmacist if you have any questions.  Self care   The following tips will keep you as comfortable as possible while you recover:     Rest    Drink plenty of water and other liquids    Take a hot shower to loosen congestion    Take a spoonful of honey to reduce your cough     Also, as your provider, I need you to know that becoming tobacco-free is the most important thing you can do to protect your current and future health.  When to seek care  See your dentist if you suspect your tooth pain is a dental problem.  Please be seen in a clinic or urgent care if new symptoms develop, or symptoms become worse.  Additional treatment plan   Dear Jose,  Based on the information you have provided, it does not appear you need Coronavirus (COVID-19) testing.   At this time, we recommend testing primarily for those people who have symptoms of cough and fever and have either traveled to a known area of infection or have been exposed to someone with laboratory confirmed Coronavirus by close contact.   Coronavirus - General Information:   The coronavirus infection starts within 14 days of an exposure.  Symptoms are those of a respiratory infection (such as fever, cough).   If you have not had symptoms by day 15, you should be considered uninfected by coronavirus.   Coronavirus - Symptoms:    The coronavirus can cause a respiratory illness, such as bronchitis or pneumonia.  The most common symptoms are: cough, fever, and shortness of breath.   Other symptoms are: body aches, chills, diarrhea, fatigue, headache, runny nose, and sore throat   Coronavirus - Exposure Risk Factors:   Exposure to a person who has been diagnosed with coronavirus.  Travel from an area with recent local transmission of coronavirus.  The CDC (www.cdc.gov) has the most up-to-date list of where the coronavirus outbreak is occurring.   Coronavirus - Spreading:    The virus likely spreads through respiratory droplets produced when a person coughs or sneezes. These respiratory droplets can travel approximately 6  feet and can remain on surfaces. Common disinfectants will kill the virus.  The CDC currently does not recommend healthy people wear masks.   Coronavirus - Protect Yourself:    Avoid close contact with people known to have this new coronavirus infection.  Wash hands often with soap and water or alcohol-based hand .  Avoid touching the eyes, nose or mouth.   Thank you for limiting contact with others, wearing a simple mask to cover your cough, practice good hand hygiene habits and accessing our virtual services where possible to limit the spread of this virus.  For more information about COVID19 and options for caring for yourself at home, please visit the CDC website at https://www.cdc.gov/coronavirus/2019-ncov/about/steps-when-sick.html   For more options for care at [facility], please visit our website at [email or web address]     COVID-19 (Coronavirus) General Information  With the increase in the number of COVID-19 (Coronavirus) cases, we understand you may have some questions. Below is some helpful information on COVID-19 (Coronavirus).  How can I protect myself and others from the COVID-19 (Coronavirus)?  Because there is currently no vaccine to prevent infection, the best way to protect yourself is to avoid being exposed to this virus. Put distance between yourself and other people if COVID-19 (Coronavirus) is spreading in your community. The virus is thought to spread mainly from person-to-person.     Between people who are in close contact with one another (within about 6 about) for prolonged period (10 minutes or longer).    Through respiratory droplets produced when an infected person coughs or sneezes.     The CDC recommends the following additional steps to protect yourself and others:     Wash your hands often with soap and water for at least 20 seconds, especially after blowing your nose, coughing, or sneezing; going to the bathroom; and before eating or  preparing food.  Use an alcohol-based hand  that contains at least 60 percent alcohol if soap and water are not available.        Avoid touching your eyes, nose and mouth with unwashed hands.    Avoid close contact with people who are sick.    Stay home when you are sick.    Cover your cough or sneeze with a tissue, then throw the tissue in the trash.    Clean and disinfect frequently touched objects and surfaces.     You can help stop COVID-19 (Coronavirus) by knowing the signs and symptoms:     Fever    Cough    Shortness of breath     Contact your healthcare provider if   Develop symptoms   AND   Have been in close contact with a person known to have COVID-19 (Coronavirus) or live in or have recently traveled from an area with ongoing spread of COVID-19 (Coronavirus). Call ahead before you go to a doctor's office or emergency room. Tell them about your recent travel and your symptoms.   For the most up to date information, visit the CDC's website.  Steps to help prevent the spread of COVID-19 (Coronavirus) if you are sick  If you are sick with COVID-19 (Coronavirus) or suspect you are infected with the virus that causes COVID-19 (Coronavirus), follow the steps below to help prevent the disease from spreading&nbsp;to people in your home and community.     Stay home except to get medical care. Home isolation may be started in consultation with your healthcare clinician.    Separate yourself from other people and animals in your home.    Call ahead before visiting your doctor if you have a medical appointment.    Wear a facemask when you are around other people.    Cover your cough and sneezes.    Clean your hands often.    Avoid sharing personal household items.    Clean and disinfect frequently touched objects and surfaces everyday.    You will need to have someone drop off medications or household supplies (if needed) at your house without coming inside or in contact with you or others living in your  "house.    Monitor your symptoms and seek prompt medical care if your illness is worsening (e.g. Difficulty breathing).    Discontinue home isolation only in consultation with your healthcare provider.     For more detailed and up to date information on what to do if you are sick, visit this link: What to Do If You Are Sick With Coronavirus Disease 2019 (COVID-19).  Do I need to be tested for COVID-19 (Coronavirus)?     At this time, the limited number of tests available are controlled by the state and local health departments and are being reserved for more seriously ill patients, those with known exposure to confirmed patients, and those with recent travel (within 14 days) to countries with high rates of COVID-19 (Coronavirus).    Decisions on which patients receive testing will be based on the local spread of COVID-19 (Coronavirus) as well as the symptoms. Your healthcare provider will make the final decision on whether you should be tested.    In the meantime, if you have concerns that you may have been exposed, it is reasonable to practice \"social distancing.\"&nbsp; If you are ill with a cold or flu-like illness, please monitor your symptoms and reach out to your healthcare provider if your symptoms worsen.    For more up to date information, visit this link: COVID-19 (Coronavirus) Frequently Asked Questions and Answers.      Diagnosis: Cough  Diagnosis ICD: R05  "

## 2020-03-16 ENCOUNTER — TELEPHONE (OUTPATIENT)
Dept: RHEUMATOLOGY | Facility: CLINIC | Age: 35
End: 2020-03-16

## 2020-03-16 DIAGNOSIS — R05.8 COUGH WITH SPUTUM: ICD-10-CM

## 2020-03-16 DIAGNOSIS — M06.1 ADULT-ONSET STILL'S DISEASE (H): ICD-10-CM

## 2020-03-16 LAB
BASOPHILS # BLD AUTO: 0.1 10E9/L (ref 0–0.2)
BASOPHILS NFR BLD AUTO: 0.7 %
DIFFERENTIAL METHOD BLD: NORMAL
EOSINOPHIL # BLD AUTO: 0.4 10E9/L (ref 0–0.7)
EOSINOPHIL NFR BLD AUTO: 4.6 %
ERYTHROCYTE [DISTWIDTH] IN BLOOD BY AUTOMATED COUNT: 13 % (ref 10–15)
ERYTHROCYTE [SEDIMENTATION RATE] IN BLOOD BY WESTERGREN METHOD: 3 MM/H (ref 0–15)
HCT VFR BLD AUTO: 47.3 % (ref 40–53)
HGB BLD-MCNC: 16.6 G/DL (ref 13.3–17.7)
LYMPHOCYTES # BLD AUTO: 3.7 10E9/L (ref 0.8–5.3)
LYMPHOCYTES NFR BLD AUTO: 44 %
MCH RBC QN AUTO: 28.9 PG (ref 26.5–33)
MCHC RBC AUTO-ENTMCNC: 35.1 G/DL (ref 31.5–36.5)
MCV RBC AUTO: 82 FL (ref 78–100)
MONOCYTES # BLD AUTO: 0.6 10E9/L (ref 0–1.3)
MONOCYTES NFR BLD AUTO: 7.2 %
NEUTROPHILS # BLD AUTO: 3.7 10E9/L (ref 1.6–8.3)
NEUTROPHILS NFR BLD AUTO: 43.5 %
PLATELET # BLD AUTO: 251 10E9/L (ref 150–450)
RBC # BLD AUTO: 5.75 10E12/L (ref 4.4–5.9)
WBC # BLD AUTO: 8.4 10E9/L (ref 4–11)

## 2020-03-16 PROCEDURE — 36415 COLL VENOUS BLD VENIPUNCTURE: CPT | Performed by: INTERNAL MEDICINE

## 2020-03-16 PROCEDURE — 85025 COMPLETE CBC W/AUTO DIFF WBC: CPT | Performed by: INTERNAL MEDICINE

## 2020-03-16 PROCEDURE — 85652 RBC SED RATE AUTOMATED: CPT | Performed by: INTERNAL MEDICINE

## 2020-03-16 NOTE — TELEPHONE ENCOUNTER
" Health Call Center    Phone Message    May a detailed message be left on voicemail: yes     Reason for Call: Patient calling in requesting to speak with nurse Mohr to discuss labs and nasal swab. Patient stating that \"nasal swab was unsuccessful.\" Please contact patient at your convenience.   Thank you, Jessica Valadez on 3/16/2020 at 12:10 PM       Action Taken: Message routed to:  [facility]    Travel Screening: Not Applicable                                                                      "

## 2020-03-16 NOTE — TELEPHONE ENCOUNTER
Returned call to pt. He had presented to the [facility] for his lab work. The blood was drawn but they would not do the nasal smear for Influenza A or B. Told pt that he needed to go through [email or web address].  Pt explained he had done this and his rheumatologist did want him this Viral panel. They would not do the nasal smear. Will review his lab results with Dr Conte and get back to pt with his recommendations. Pt is agreeable to this plan.    ARTEM BuenoN RN  Rheumatology Care Coordinator  [facility]

## 2020-03-16 NOTE — TELEPHONE ENCOUNTER
Pt returned call. Jose reports he has improved, not quite 100 %, but is better as of Saturday. Denies feeling feverish, chills, discomfort in face is down, still has an occasionally cough that is productive of greenish mucus. Pt did go in for labs on Saturday - he had standing orders, because he hadn't had them done for a while. Explained to pt that Dr Conte did want a couple of additional tests done in addition to the tests he had done. Pt is agreeable to go to a [place] lab to get the CBC, ESR, and Viral panel. These orders have been entered per verbal order. Pt did have a Ferritin and CRP done.     Anemia Latest Ref Rng & Units 7/5/2019 7/15/2019 8/1/2019 8/30/2019 9/23/2019 10/22/2019 3/14/2020   Ferritin 26 - 388 ng/mL 78 91 134 135 145 96 199     CRP Inflammation   Date Value Ref Range Status   03/14/2020 17.0 (H) 0.0 - 8.0 mg/L Final     Pt works overnight at a convenience store, he is the manager. Jose said he does have some face to face contact with customers, as well as office work. Pt is wondering if it is safe for him to be at work. We discussed the importance of hand washing, avoiding crowds, obvious sick individuals, and touching his face. Will forward note to Dr Conte for his recommendations.    Onur Llanos, ARTEMN RN  Rheumatology Care Coordinator  [facility]

## 2020-03-16 NOTE — TELEPHONE ENCOUNTER
Pt had been screened via a virtual visit on 3/13/2020 and it was determined that he did not meet criteria for testing for COVID-19. Lab orders for Dr Conte's recommended testing set up. Left message asking pt to return call to discuss his condition.    ARTEM BuenoN RN  Rheumatology Care Coordinator  [facility]

## 2020-03-17 NOTE — TELEPHONE ENCOUNTER
Reviewed pt's lab results with Dr Conte. It does not appear that pt's Still's is flaring. Recommend to pt that he monitor for now, and to return call if he worsens. Also reminded him to contact the [email or web address] he develops the symptoms of corona virus:    Reported COVID-19 illnesses have ranged from mild symptoms to severe illness in confirmed coronavirus disease 2019 cases.    The following symptoms may appear 2-14 days after exposure.*      Fever    Cough    Shortness of breath    *This list is not all inclusive.    If pt develops the following emergency warning signs for COVID-19 get medical attention immediately. Emergency warning signs include:      Difficulty breathing or shortness of breath    Persistent pain or pressure in the chest    New confusion or inability to arouse    Bluish lips or face    Returned call to pt with the above information and recommendations.     Jose verbalized understanding and is in agreement.    ARTEM BuenoN RN  Rheumatology Care Coordinator  [facility]

## 2020-03-18 NOTE — TELEPHONE ENCOUNTER
I agree with your recommendations.    Based on what we know from his work-up and his description of things, he is likely having good resolution of his own symptoms and is unlikely to be infectious with anything at this point.    For the foreseeable future he needs to practice good social  Isolation and very frequent handwashing precautions like everybody.

## 2020-04-21 ENCOUNTER — VIRTUAL VISIT (OUTPATIENT)
Dept: RHEUMATOLOGY | Facility: CLINIC | Age: 35
End: 2020-04-21
Attending: INTERNAL MEDICINE
Payer: COMMERCIAL

## 2020-04-21 DIAGNOSIS — M06.1 ADULT-ONSET STILL'S DISEASE (H): Primary | ICD-10-CM

## 2020-04-21 DIAGNOSIS — Z51.81 MEDICATION MONITORING ENCOUNTER: ICD-10-CM

## 2020-04-21 ASSESSMENT — PAIN SCALES - GENERAL: PAINLEVEL: NO PAIN (0)

## 2020-04-21 NOTE — PROGRESS NOTES
"Jose Spears is a 34 year old male who is being evaluated via a billable telephone visit.      The patient has been notified of following:     \"This telephone visit will be conducted via a call between you and your physician/provider. We have found that certain health care needs can be provided without the need for a physical exam.  This service lets us provide the care you need with a short phone conversation.  If a prescription is necessary we can send it directly to your pharmacy.  If lab work is needed we can place an order for that and you can then stop by our lab to have the test done at a later time.    Telephone visits are billed at different rates depending on your insurance coverage. During this emergency period, for some insurers they may be billed the same as an in-person visit.  Please reach out to your insurance provider with any questions.    If during the course of the call the physician/provider feels a telephone visit is not appropriate, you will not be charged for this service.\"    Patient has given verbal consent for Telephone visit?  Yes    How would you like to obtain your AVS? Shirleyhart     Name: Jose Spears  MRN: [id number]  Age: 34 year old  : 1985  Referring provider: Referred Self     Problem List:  Adult-onset Still's disease        Review of Systems:   Pertinent items are noted in HPI or as below, remainder of complete ROS is negative.       No recent problems with hearing or vision. No swallowing problems.   No breathing difficulty, shortness of breath, coughing, or wheezing.  No chest pain or palpitations.  No heart burn, indigestion, abdominal pain, nausea, vomiting, diarrhea.  No urination problems, no bloody, cloudy urine, no dysuria.  No numbing, tingling, weakness.  No headaches or confusion.  No easy bleeding or bruising.      Active Medications:      Current Outpatient Medications:      anakinra (KINERET) 100 MG/0.67ML SOSY injection, Inject 0.67 mLs (100 mg) " Subcutaneous daily Hold for signs of infection, then seek medical attention., Disp: 30 Syringe, Rfl: 5      Allergies:   Codeine and Morphine      Past Medical History:  Adult-onset Still's disease  Osteogenesis imperfecta  External hemorrhoids  Tobacco abuse     Past Surgical History:  Orthopedic surgery     Family History:   Osteogenesis imperfecta - father  Diabetes - maternal grandmother  Hypertension - maternal grandmother  Stroke - maternal grandmother  Diabetes - maternal aunt  Colorectal cancer - maternal grandfather      Social History:  The patient reports that he has been smoking cigarettes. He has been smoking about 1.00 pack per day. He has never used smokeless tobacco. He reports current alcohol use. He reports that he does not use drugs.   Marital Status: Single        Interval history:    Since we last seen him he has remained on the anakinra.  He tried going off for a couple of days after forgetting 1 dose and decided to see what happened.  He quickly got recurrence of his rashes however and resumed.    In about mid March she developed an episode where he got very weak diffusely achy tired with a cough congested and he felt like he might of had a fever although he did not have a thermometer at first and when he got 1 it did not prove to be a fever.  He went to the [facility] and they screened him but did not do any testing for coronavirus.  He was sick for 4 to 5 days and then gradually improved.  Per his description he felt pretty poorly however.    He said no problems since however.  He continues to be symptom-free in terms of his a OSD with no fevers no rashes and no other symptoms.    Plan/recommendation: He will continue on his medication at this time and will make no other changes.    Based on his symptoms I think it is highly likely he was exposed to and had coronavirus infection.  He might well not have developed a fever on anakinra as it is a very good drug for fever  suppression.  He probably should have been screened but fortunately had a good outcome.    He will contact us with any other difficulties and I will plan on seeing him back in 4 to 6 months sooner as needed.    This telephone visit was 21 minutes in duration, over 50% in counseling.    HORACIO Conte MD, PhD    Rheumatology

## 2020-05-05 DIAGNOSIS — M08.20 STILL'S DISEASE (H): ICD-10-CM

## 2020-05-06 RX ORDER — ANAKINRA 100 MG/.67ML
100 INJECTION, SOLUTION SUBCUTANEOUS DAILY
Qty: 30 SYRINGE | Refills: 5 | Status: SHIPPED | OUTPATIENT
Start: 2020-05-06 | End: 2020-11-02

## 2020-05-06 NOTE — TELEPHONE ENCOUNTER
anakinra (KINERET) 100 MG/0.67ML SOSY injection    : Inject 0.67 mLs (100 mg) Subcutaneous daily Hold for signs of infection, then seek medical attention  Last Written Prescription Date:  10/22/2019  Last Fill Quantity: 30,   # refills: 5  Last Office Visit: 4/21/20/ note unsigned  Future Office visit:  6 months    CBC RESULTS:   Recent Labs   Lab Test 03/16/20  1145   WBC 8.4   RBC 5.75   HGB 16.6   HCT 47.3   MCV 82   MCH 28.9   MCHC 35.1   RDW 13.0          Creatinine   Date Value Ref Range Status   06/01/2019 1.03 0.66 - 1.25 mg/dL Final   ]    Liver Function Studies -   Recent Labs   Lab Test 03/14/20  0919   PROTTOTAL 7.7   ALBUMIN 4.1   BILITOTAL 0.6   ALKPHOS 61   AST 16   ALT 44       Routing refill request to provider for review/approval because:  Unsure if seen/ reached at Virtual visit 4/21/20, note unsigned

## 2020-07-05 ENCOUNTER — HOSPITAL ENCOUNTER (EMERGENCY)
Facility: CLINIC | Age: 35
Discharge: HOME | End: 2020-07-05
Attending: EMERGENCY MEDICINE | Admitting: EMERGENCY MEDICINE
Payer: COMMERCIAL

## 2020-07-05 VITALS
HEART RATE: 79 BPM | OXYGEN SATURATION: 98 % | RESPIRATION RATE: 106 BRPM | SYSTOLIC BLOOD PRESSURE: 135 MMHG | TEMPERATURE: 98.2 F | DIASTOLIC BLOOD PRESSURE: 92 MMHG

## 2020-07-05 DIAGNOSIS — S91.319A LACERATION OF FOOT, UNSPECIFIED LATERALITY, INITIAL ENCOUNTER: ICD-10-CM

## 2020-07-05 DIAGNOSIS — F10.929 ALCOHOLIC INTOXICATION WITH COMPLICATION (H): ICD-10-CM

## 2020-07-05 PROCEDURE — 90471 IMMUNIZATION ADMIN: CPT

## 2020-07-05 PROCEDURE — 99283 EMERGENCY DEPT VISIT LOW MDM: CPT | Mod: 25

## 2020-07-05 PROCEDURE — 90715 TDAP VACCINE 7 YRS/> IM: CPT | Performed by: EMERGENCY MEDICINE

## 2020-07-05 PROCEDURE — 12004 RPR S/N/AX/GEN/TRK7.6-12.5CM: CPT

## 2020-07-05 PROCEDURE — 25000128 H RX IP 250 OP 636: Performed by: EMERGENCY MEDICINE

## 2020-07-05 PROCEDURE — 25000132 ZZH RX MED GY IP 250 OP 250 PS 637: Performed by: EMERGENCY MEDICINE

## 2020-07-05 RX ORDER — LIDOCAINE HYDROCHLORIDE AND EPINEPHRINE 10; 10 MG/ML; UG/ML
INJECTION, SOLUTION INFILTRATION; PERINEURAL
Status: DISCONTINUED
Start: 2020-07-05 | End: 2020-07-05 | Stop reason: HOSPADM

## 2020-07-05 RX ORDER — CEPHALEXIN 500 MG/1
500 CAPSULE ORAL ONCE
Status: COMPLETED | OUTPATIENT
Start: 2020-07-05 | End: 2020-07-05

## 2020-07-05 RX ADMIN — CLOSTRIDIUM TETANI TOXOID ANTIGEN (FORMALDEHYDE INACTIVATED), CORYNEBACTERIUM DIPHTHERIAE TOXOID ANTIGEN (FORMALDEHYDE INACTIVATED), BORDETELLA PERTUSSIS TOXOID ANTIGEN (GLUTARALDEHYDE INACTIVATED), BORDETELLA PERTUSSIS FILAMENTOUS HEMAGGLUTININ ANTIGEN (FORMALDEHYDE INACTIVATED), BORDETELLA PERTUSSIS PERTACTIN ANTIGEN, AND BORDETELLA PERTUSSIS FIMBRIAE 2/3 ANTIGEN 0.5 ML: 5; 2; 2.5; 5; 3; 5 INJECTION, SUSPENSION INTRAMUSCULAR at 02:39

## 2020-07-05 RX ADMIN — CEPHALEXIN 500 MG: 500 CAPSULE ORAL at 02:39

## 2020-07-05 ASSESSMENT — ENCOUNTER SYMPTOMS
WOUND: 1
CHILLS: 0
VOMITING: 0

## 2020-07-05 NOTE — ED NOTES
Education provided to patient about suture removal/follow up care, keeping wound clean and dry. Pt and friend verbalized understanding.

## 2020-07-05 NOTE — DISCHARGE INSTRUCTIONS
Discharge Instructions  Laceration (Cut)    You were seen today for a laceration (cut).  Your provider examined your laceration for any problems such a buried foreign body (like glass, a splinter, or gravel), or injury to blood vessels, tendons, and nerves.  Your provider may have also rinsed and/or scrubbed your laceration to help prevent an infection. It may not be possible to find all problems with your laceration on the first visit; occasionally foreign bodies or a tendon injury can go undetected.    Your laceration may have been closed in one of several ways:  No closure: many wounds will heal just fine without closure.  Stitches: regular stitches that require removal.  Staples: skin staples are often used in the scalp/head.  Wound adhesive (glue): skin glue can be used for certain lacerations and doesn t require removal.  Wound strips (aka Butterfly bandages or steri-strips): these are bandages that help to close a wound.  Absorbable stitches:  dissolving  stitches that go away on their own and usually don t require removal.    A small percentage of wounds will develop an infection regardless of how well the wound is cared for. Antibiotics are generally not indicated to prevent an infection so are only given for a small number of high-risk wounds. Some lacerations are too high risk to close, and are left open to heal because closure can increase the likelihood that an infection will develop.    Remember that all lacerations, no matter how expertly repaired, will cause scarring. We consider many factors, techniques, and materials, in our efforts to provide the best possible cosmetic outcome.    Generally, every Emergency Department visit should have a follow-up clinic visit with either a primary or a specialty clinic/provider. Please follow-up as instructed by your emergency provider today.     Return to the Emergency Department right away if:  You have more redness, swelling, pain, drainage (pus), a bad smell,  or red streaking from your laceration as these symptoms could indicate an infection.  You have a fever of 100.4 F or more.  You have bleeding that you cannot stop at home. If your cut starts to bleed, hold pressure on the bleeding area with a clean cloth or put pressure over the bandage.  If the bleeding does not stop after using constant pressure for 30 minutes, you should return to the Emergency Department for further treatment.  An area past the laceration is cool, pale, or blue compared with the other side, or has a slower return of color when squeezed.  Your dressing seems too tight or starts to get uncomfortable or painful. For children, signs of a problem might be irritability or restlessness.  You have loss of normal function or use of an area, such as being unable to straighten or bend a finger normally.  You have a numb area past the laceration.    Return to the Emergency Department or see your regular provider if:  The laceration starts to come open.   You have something coming out of the cut or a feeling that there is something in the laceration.  Your wound will not heal, or keeps breaking open. There can always be glass, wood, dirt or other things in any wound.  They will not always show up, even on x-rays.  If a wound does not heal, this may be why, and it is important to follow-up with your regular provider.    Home Care:  Take your dressing off in 12-24 hours, or as instructed by your provider, to check your laceration. Remove the dressing sooner if it seems too tight or painful, or if it is getting numb, tingly, or pale past the dressing.  Gently wash your laceration 1-2 times daily with clean water and mild soap. It is okay to shower or run clean water over the laceration, but do not let the laceration soak in water (no swimming).  If your laceration was closed with wound adhesive or strips: pat it dry and leave it open to the air. For all other repairs: after you wash your laceration, or at least  2 times a day, apply antibiotic ointment (such as Neosporin  or Bacitracin ) to the laceration, then cover it with a Band-Aid  or gauze.  Keep the laceration clean. Wear gloves or other protective clothing if you are around dirt.    Follow-up for removal:  If your wound was closed with staples or regular stitches, they need to be removed according to the instructions and timeline specified by your provider today.  If your wound was closed with absorbable ( dissolving ) sutures, they should fall out, dissolve, or not be visible in about one week. If they are still visible, then they should be removed according to the instructions and timeline specified by your provider today.    Scars:  To help minimize scarring:  Wear sunscreen over the healed laceration when out in the sun.  Massage the area regularly once healed.  You may apply Vitamin E to the healed wound.  Wait. Scars improve in appearance over months and years.    If you were given a prescription for medicine here today, be sure to read all of the information (including the package insert) that comes with your prescription.  This will include important information about the medicine, its side effects, and any warnings that you need to know about.  The pharmacist who fills the prescription can provide more information and answer questions you may have about the medicine.  If you have questions or concerns that the pharmacist cannot address, please call or return to the Emergency Department.       Remember that you can always come back to the Emergency Department if you are not able to see your regular provider in the amount of time listed above, if you get any new symptoms, or if there is anything that worries you.  Discharge Instructions  Alcohol Intoxication    You have been seen today with alcohol intoxication. This means that you have enough alcohol in your system to impair your ability to mentally and physically function, perhaps to the extent that you  were unable to care for yourself.    Generally, every Emergency Department visit should have a follow-up clinic visit with either a primary or a specialty clinic/provider. Please follow-up as instructed by your emergency provider today.    You may have come to the Emergency Department because of your intoxication, or for another reason, such as because of an injury. No matter what the case is, this visit is a  red flag  regarding alcohol use, and you should consider whether your drinking pattern is a problem for you.     You may be at risk for alcohol-related problems if:    Men: you drink more than 14 drinks per week, or more than 4 drinks per occasion.    Women: you drink more than 7 drinks per week or more than 3 drinks per occasion.    You have black-outs.  You do things you regret while drinking.  You have legal problems because of drinking.  You have job problems because of drinking (you call in sick to work because of drinking).    CAGE Questions  Have you ever felt you should cut down on your drinking?  Have people annoyed you by criticizing your drinking?  Have you ever felt bad or guilty about your drinking?  Have you ever had a drink first thing in the morning to steady your nerves or get rid of a hangover (eye opener)?    If you answer yes to any of the CAGE questions, you may have a problem with alcohol.      Return to the Emergency Department if:  You become shaky or tremble when you try to stop drinking.   You have severe abdominal pain (belly pain).   You have a seizure or pass out.    You vomit (throw up) blood or have blood in your stool. This may be bright red or it may look like black coffee grounds.  You become lightheaded or faint.      For further help, contact:   Your caregiver.    Alcoholics Anonymous (AA).    [facility]: [phone]  [facility] Central Office: [phone]   A drug or alcohol rehabilitation program.    You can get information on alcohol  resources and groups by calling the number 462 or 1-464.443.7489 on any phone.     Seek medical care if:  You have persistent vomiting.   You have persistent pain in any part of your body.    You do not feel better after a few days.    If you were given a prescription for medicine here today, be sure to read all of the information (including the package insert) that comes with your prescription.  This will include important information about the medicine, its side effects, and any warnings that you need to know about.  The pharmacist who fills the prescription can provide more information and answer questions you may have about the medicine.  If you have questions or concerns that the pharmacist cannot address, please call or return to the Emergency Department.   Remember that you can always come back to the Emergency Department if you are not able to see your regular doctor in the amount of time listed above, if you get any new symptoms, or if there is anything that worries you.

## 2020-07-05 NOTE — ED AVS SNAPSHOT
[facility]  [address]  [facility] [address]  Phone:  [phone]  Fax:  [phone]                                    Jose Spears   MRN: [id number]    Department:  [facility]   Date of Visit:  7/5/2020           After Visit Summary Signature Page    I have received my discharge instructions, and my questions have been answered. I have discussed any challenges I see with this plan with the nurse or doctor.    ..........................................................................................................................................  Patient/Patient Representative Signature      ..........................................................................................................................................  Patient Representative Print Name and Relationship to Patient    ..................................................               ................................................  Date                                   Time    ..........................................................................................................................................  Reviewed by Signature/Title    ...................................................              ..............................................  Date                                               Time          22EPIC Rev 08/18

## 2020-07-05 NOTE — ED PROVIDER NOTES
History     Chief Complaint:  Laceration    HPI   Jose Spears is a 34 year old male who presents with a laceration. The patient states that he was drinking alcohol tonight while at a bonfire with friends when he slipped and cut his left foot on a brick. The patient denies any other injuries. The patient denies any vomiting or chills.     Patient's last Tdap was 2008     Allergies:  Codeine  Morphine    Medications:    Kineret      Past Medical History:    Osteogenesis imperfecta  Adult-onset Still's disease  Obesity   Mixed conductive and sensorineural hearing loss of left ear    Past Surgical History:    Orthopedic surgery     Family History:    Father: genetic disorder    Social History:  Smoking status: current: 1.00 ppd   Alcohol use: yes: occasionally   Drug use: No  The patient presents to the emergency department alone via personal vehicle  PCP: No Ref-Primary, Physician  Marital Status:  Single [1]    Review of Systems   Constitutional: Negative for chills.   Gastrointestinal: Negative for vomiting.   Skin: Positive for wound.   All other systems reviewed and are negative.      Physical Exam     Patient Vitals for the past 24 hrs:   BP Temp Pulse Heart Rate Resp SpO2   07/05/20 0147 (!) 135/92 98.2  F (36.8  C) 79 79 (!) 106 98 %       Physical Exam  Constitutional:  Oriented to person, place, and time. Intoxicated.   HENT:   Head:    Normocephalic.   Mouth/Throat:   Oropharynx is clear and moist.   Eyes:    EOM are normal. Pupils are equal, round, and reactive to light.   Neck:    Neck supple.   Musculoskeletal:  Normal range of motion. Left foot neurovascularly intact.   Neurological:   Intoxicated.   Skin:    No rash noted. No pallor.   Derm: large semi-lunar horizontal deep laceration of left plantar foot along his 1st MTP with no deep structural involvement. No obvious tendon or blood vessels damage noted.    Emergency Department Course   Procedures:      Laceration Repair        LACERATION:  A  simple clean 10 cm laceration.      LOCATION:  Left foot      FUNCTION:  Distally sensation and motor are intact.      ANESTHESIA:  Digital block using 5cc total of lidocaine with epi mLs      PREPARATION:  Irrigation with Normal Saline      DEBRIDEMENT:  no debridement      CLOSURE:  Wound was closed with One Layer.  Skin closed with 10 x 3.0 Ethylon using interrupted sutures.    Interventions:  0239 Keflex 500 mg PO   0239 Tdap 0.5 mL IM     Emergency Department Course:  Past medical records, nursing notes, and vitals reviewed.  0156: I performed an exam of the patient and obtained history, as documented above.       I performed the lac repair     I rechecked the patient. I reviewed the results with the Patient and answered all related questions prior to discharge.     Findings and plan explained to the Patient. Patient discharged home with instructions regarding supportive care, medications, and reasons to return. The importance of close follow-up was reviewed.   Impression & Plan   Medical Decision Making:  Jose Spears is a 34 year old male who presents for evaluation of a laceration to the left plantar foot.  The wound was carefully evaluated and explored.  The laceration was closed with 10 x 3.0 Ethylon as noted above.  There is no evidence of muscular, tendon, or bony damage with this laceration.  No signs of foreign body.  Possible complications (infection, scarring) were reviewed with the patient.  Follow up with primary care as noted in the discharge section. The patient is intoxicated while here in the emergency department by self admission. He has no history of DT's or alcohol withdrawal seizures. There are no signs of co-ingestion. He has no signs of additional trauma related to alcohol use and no further workup is needed including head CT. The patient's partner is here at the emergency department to drive the patient home.     Diagnosis:    ICD-10-CM    1. Laceration of foot, unspecified  laterality, initial encounter  S91.319A    2. Alcoholic intoxication with complication (H)  F10.051      Disposition:  Discharged to home.    Niyah Iraheta  7/5/2020   [facility] EMERGENCY DEPARTMENT  Scribe Disclosure:  I, Niyah Iraheta, am serving as a scribe at 1:56 AM on 7/5/2020 to document services personally performed by Maxime Tena MD based on my observations and the provider's statements to me.        Maxime Tena MD  07/05/20 0546

## 2020-07-08 ENCOUNTER — OFFICE VISIT (OUTPATIENT)
Dept: URGENT CARE | Facility: URGENT CARE | Age: 35
End: 2020-07-08
Payer: COMMERCIAL

## 2020-07-08 ENCOUNTER — MYC MEDICAL ADVICE (OUTPATIENT)
Dept: RHEUMATOLOGY | Facility: CLINIC | Age: 35
End: 2020-07-08

## 2020-07-08 VITALS
DIASTOLIC BLOOD PRESSURE: 60 MMHG | SYSTOLIC BLOOD PRESSURE: 120 MMHG | RESPIRATION RATE: 18 BRPM | BODY MASS INDEX: 32.78 KG/M2 | HEART RATE: 75 BPM | OXYGEN SATURATION: 98 % | WEIGHT: 235 LBS | TEMPERATURE: 98.4 F

## 2020-07-08 DIAGNOSIS — S91.311A FOOT LACERATION, RIGHT, INITIAL ENCOUNTER: Primary | ICD-10-CM

## 2020-07-08 PROCEDURE — 99213 OFFICE O/P EST LOW 20 MIN: CPT | Performed by: FAMILY MEDICINE

## 2020-07-08 RX ORDER — CEPHALEXIN 500 MG/1
500 CAPSULE ORAL 3 TIMES DAILY
Qty: 30 CAPSULE | Refills: 0 | Status: SHIPPED | OUTPATIENT
Start: 2020-07-08 | End: 2020-07-18

## 2020-07-08 SDOH — HEALTH STABILITY: MENTAL HEALTH: HOW OFTEN DO YOU HAVE 6 OR MORE DRINKS ON ONE OCCASION?: NEVER

## 2020-07-08 SDOH — HEALTH STABILITY: MENTAL HEALTH: HOW OFTEN DO YOU HAVE A DRINK CONTAINING ALCOHOL?: MONTHLY OR LESS

## 2020-07-08 SDOH — HEALTH STABILITY: MENTAL HEALTH: HOW MANY STANDARD DRINKS CONTAINING ALCOHOL DO YOU HAVE ON A TYPICAL DAY?: 1 OR 2

## 2020-07-08 ASSESSMENT — ENCOUNTER SYMPTOMS
VOMITING: 0
SHORTNESS OF BREATH: 0
CHILLS: 0
FEVER: 0
NAUSEA: 0

## 2020-07-08 NOTE — PATIENT INSTRUCTIONS
Keflex (antibiotic), as prescribed.    Contact your provider regarding your Kineret medication, as discussed.    Use over-the-counter medications (e.g. Bacitracin), only as discussed.    Avoid Neosporin.    Avoid tub soaks, swimming, and hot tubs until the your laceration is healed.    Post-op shoe and crutches.    Recheck in 48 hours.    Follow-up sooner if worsening redness, pain, or swelling involving the laceration site.    Follow-up for suture removal, as previously recommended/scheduled.

## 2020-07-08 NOTE — PROGRESS NOTES
SUBJECTIVE:   Jose Spears is a 34 year old male presenting with a chief complaint of   Chief Complaint   Patient presents with     Wound Check     not looking good, swelling and oozing, and painful throbbing        He is an established patient of [facility].    HPI: The patient is a 34-year-old male, with history of a recent right foot/great toe laceration.  This was evaluated and repaired 3 days ago, 7/5/2020.  Patient states he lacerated his foot, stepping on a fire pit.      The patient's laceration was closed with 10 sutures of 3-0 Ethilon on 7/5/2020, per chart review.  Patient states that he has an appointment to have the sutures removed on 7/17/2020.  Patient is concerned that there has been some erythema, pain, and drainage involving his foot since his laceration repair.  Patient has been applying Neosporin.  He is using crutches to avoid disrupting his sutures.  Patient presents to rule out an infection.    Last Tdap (Adacel) was 7/5/2020, as recently updated.      The patient is on Kineret, followed by Rheumatology.     Review of Systems   Constitutional: Negative for chills and fever.   Respiratory: Negative for shortness of breath.    Cardiovascular: Negative for chest pain.   Gastrointestinal: Negative for nausea and vomiting.       Patient Active Problem List   Diagnosis     Osteogenesis imperfecta     Tobacco abuse     External hemorrhoids     Adult-onset Still's disease (H)     Mixed conductive and sensorineural hearing loss of left ear     Class 1 obesity due to excess calories without serious comorbidity with body mass index (BMI) of 30.0 to 30.9 in adult       Past Medical History:   Diagnosis Date     Osteogenesis imperfecta      Still's disease of adult (H)        Allergies   Allergen Reactions     Codeine Nausea and Vomiting     Morphine Nausea and Vomiting       Family History   Problem Relation Age of Onset     Genetic Disorder Father         OI     Diabetes Maternal Grandmother       Hypertension Maternal Grandmother      Cerebrovascular Disease Maternal Grandmother      Diabetes Maternal Aunt      Cancer - colorectal Maternal Grandfather      Melanoma No family hx of      Skin Cancer No family hx of        Current Outpatient Medications   Medication Sig Dispense Refill                   anakinra (KINERET) 100 MG/0.67ML SOSY injection Inject 0.67 mLs (100 mg) Subcutaneous daily Hold for signs of infection, then seek medical attention. 30 Syringe 5       Social History     Tobacco Use     Smoking status: Former Smoker     Packs/day: 1.00     Types: Cigarettes     Smokeless tobacco: Never Used   Substance Use Topics     Alcohol use: Yes     Frequency: Monthly or less     Drinks per session: 1 or 2     Binge frequency: Never     Comment: occasionally       OBJECTIVE  /60 (BP Location: Right arm, Patient Position: Sitting, Cuff Size: Adult Large)   Pulse 75   Temp 98.4  F (36.9  C) (Tympanic)   Resp 18   Wt 106.6 kg (235 lb)   SpO2 98%   BMI 32.78 kg/m      Physical Exam    GENERAL APPEARANCE:  Awake, alert, and in no acute distress.   PSYCHIATRIC:  Pleasant affect.  HEENT:  Sclera anicteric.  No conjunctivitis.    NECK:  Spontaneous full range of motion.    HEART:  Normal S1, S2.  Regular rate and rhythm.  No murmurs, rubs, or gallops.  LUNGS:  No respiratory distress.  No wheezes, rales, or rhonchi.  ABDOMEN:  Not distended.    EXTREMITIES:  Moves 4 extremities, but declines moving the toes of his right foot.  Right great toe is mildly erythematous and edematous.  Distal pulses are intact.  Capillary refill < 3 seconds.  LACERATION:  The patient has an ~8 cm laceration involving his right foot, extending from the right 1st MTP joint.  The laceration wraps under the right great toe, extending between the toes of the right great and 2nd toes.  10 intact sutures are noted, with only a small amount of drainage noted involving the area between the great and 2nd toes.  There is a 1 cm area  of erythema and ecchymosis noted involving the anterior right great toe, just medial to the recently repaired laceration.  Wound edges are well-approximated.      X-ray Right Foot:  Recommended to rule out foreign body.  Declined by patient.      Last Creatinine:    Recent Labs   Lab Test 06/01/19  1029   CR 1.03       ASSESSMENT:      ICD-10-CM    1. Foot laceration, right, initial encounter  S91.311A order for DME     cephALEXin (KEFLEX) 500 MG capsule     CANCELED: XR Foot Right G/E 3 Views      Cannot rule out infection/cellulitis, based on today's exam.    Patient is on Kineret, followed by Rheumatology.    PLAN:    Letter for work, as noted in Epic.    Patient Instructions     Keflex (antibiotic), as prescribed.    Contact your provider regarding your Kineret medication, as discussed.    Use over-the-counter medications (e.g. Bacitracin), only as discussed.    Avoid Neosporin.    Avoid tub soaks, swimming, and hot tubs until the your laceration is healed.    Post-op shoe and crutches.    Recheck in 48 hours.    Follow-up sooner if worsening redness, pain, or swelling involving the laceration site.    Follow-up for suture removal, as previously recommended/scheduled.    Discussed risks and benefits of treatment strategies, as noted in the Assessment and Plan sections.    The patient was discharged ambulatory and in stable condition post discussion of follow up.     Disclaimer: The above dictation was composed using a combination of keyboarding and voice recognition software.  As a result, there may be errors in the dictation that have gone undetected.  Please consider this when interpreting the information found in this chart.    Ashlyn Long MD

## 2020-07-09 NOTE — TELEPHONE ENCOUNTER
Please see other [facility] message encounter dated 7/8/2020 for recommendations.    ARTEM BuenoN RN  Rheumatology Care Coordinator  [facility]

## 2020-07-09 NOTE — TELEPHONE ENCOUNTER
Reviewed and responded to pt.    ARTEM BuenoN RN  Rheumatology Care Coordinator  [facility]

## 2020-07-17 ENCOUNTER — ALLIED HEALTH/NURSE VISIT (OUTPATIENT)
Dept: NURSING | Facility: CLINIC | Age: 35
End: 2020-07-17
Payer: COMMERCIAL

## 2020-07-17 DIAGNOSIS — Z48.02 ENCOUNTER FOR REMOVAL OF SUTURES: Primary | ICD-10-CM

## 2020-07-17 PROCEDURE — 99207 ZZC NO CHARGE NURSE ONLY: CPT

## 2020-07-17 NOTE — PROGRESS NOTES
Jose TREVINO Regan presents to the clinic today for removal of sutures.  The patient has had the sutures in place for 14 days.  There has been no history of infection or drainage.  9 sutures are seen located on the right foot/great toe. Per patient one fell out. The wound is healing well with no signs of infection.  Tetanus status is up to date.   All sutures were easily removed today.  Routine wound care discussed.  The patient will follow up as needed.    Danette Clayton RN on 7/17/2020 at 1:50 PM

## 2020-10-29 DIAGNOSIS — M08.20 STILL'S DISEASE (H): ICD-10-CM

## 2020-11-02 RX ORDER — ANAKINRA 100 MG/.67ML
100 INJECTION, SOLUTION SUBCUTANEOUS DAILY
Qty: 30 SYRINGE | Refills: 1 | Status: SHIPPED | OUTPATIENT
Start: 2020-11-02 | End: 2021-02-11

## 2020-11-02 NOTE — TELEPHONE ENCOUNTER
anakinra (KINERET) 100 MG/0.67ML SOSY injection      Last Written Prescription Date:  5/6/2020  Last Fill Quantity: 30 syringe,   # refills: 5  Last Office Visit : 4/21/2020  Future Office visit:  none    Routing refill request to provider for review/approval because:  Failed Rheumatology medication protocol: last visit > 6 months.  - plan last visit 4/21/2020 RTC 4-6 months.  - message was sent to Rheumatology scheduling.

## 2020-11-03 ENCOUNTER — VIRTUAL VISIT (OUTPATIENT)
Dept: RHEUMATOLOGY | Facility: CLINIC | Age: 35
End: 2020-11-03
Attending: INTERNAL MEDICINE
Payer: COMMERCIAL

## 2020-11-03 DIAGNOSIS — Z51.81 MEDICATION MONITORING ENCOUNTER: ICD-10-CM

## 2020-11-03 DIAGNOSIS — M06.1 ADULT-ONSET STILL'S DISEASE (H): Primary | ICD-10-CM

## 2020-11-03 PROCEDURE — 99213 OFFICE O/P EST LOW 20 MIN: CPT | Mod: GT | Performed by: INTERNAL MEDICINE

## 2020-11-03 ASSESSMENT — PAIN SCALES - GENERAL: PAINLEVEL: NO PAIN (0)

## 2020-11-03 NOTE — LETTER
"11/3/2020       RE: Jose Spears  [address] [facility] [address]     Dear Colleague,    Thank you for referring your patient, Jose Spears, to the [facility] [place] at [facility]. Please see a copy of my visit note below.    Jose Spears is a 35 year old male who is being evaluated via a billable video visit.      The patient has been notified of following:     \"This video visit will be conducted via a call between you and your physician/provider. We have found that certain health care needs can be provided without the need for an in-person physical exam.  This service lets us provide the care you need with a video conversation.  If a prescription is necessary we can send it directly to your pharmacy.  If lab work is needed we can place an order for that and you can then stop by our lab to have the test done at a later time.    Video visits are billed at different rates depending on your insurance coverage.  Please reach out to your insurance provider with any questions.    If during the course of the call the physician/provider feels a video visit is not appropriate, you will not be charged for this service.\"    Patient has given verbal consent for Video visit? Yes  How would you like to obtain your AVS? MyChart    Will anyone else be joining your video visit? No      Video-Visit Details    Type of service:  Video Visit    Originating Location (pt. Location): Home    Distant Location (provider location):  [facility] [place]     Platform used for Video Visit: [employer]     dave: Jose Spears  MRN: [id number]  Age: 34 year old  : 1985  Referring provider: Referred Self     Problem List:  Adult-onset Still's disease        Review of Systems:   Pertinent items are noted in HPI or as below, remainder of complete ROS is negative.       No recent problems with hearing or vision. No " swallowing problems.   No breathing difficulty, shortness of breath, coughing, or wheezing.  No chest pain or palpitations.  No heart burn, indigestion, abdominal pain, nausea, vomiting, diarrhea.  No urination problems, no bloody, cloudy urine, no dysuria.  No numbing, tingling, weakness.  No headaches or confusion.  No easy bleeding or bruising.      Active Medications:      Current Outpatient Medications:      anakinra (KINERET) 100 MG/0.67ML SOSY injection, Inject 0.67 mLs (100 mg) Subcutaneous daily Hold for signs of infection, then seek medical attention., Disp: 30 Syringe, Rfl: 5      Allergies:   Codeine and Morphine      Past Medical History:  Adult-onset Still's disease  Osteogenesis imperfecta  External hemorrhoids  Tobacco abuse     Past Surgical History:  Orthopedic surgery     Family History:   Osteogenesis imperfecta - father  Diabetes - maternal grandmother  Hypertension - maternal grandmother  Stroke - maternal grandmother  Diabetes - maternal aunt  Colorectal cancer - maternal grandfather      Social History:  The patient reports that he has been smoking cigarettes. He has been smoking about 1.00 pack per day. He has never used smokeless tobacco. He reports current alcohol use. He reports that he does not use drugs.   Marital Status: Single        4/21/2020 interval history:    Since we last seen him he has remained on the anakinra.  He tried going off for a couple of days after forgetting 1 dose and decided to see what happened.  He quickly got recurrence of his rashes however and resumed.    In about mid March she developed an episode where he got very weak diffusely achy tired with a cough congested and he felt like he might of had a fever although he did not have a thermometer at first and when he got 1 it did not prove to be a fever.  He went to the [facility] and they screened him but did not do any testing for coronavirus.  He was sick for 4 to 5 days and then gradually  improved.  Per his description he felt pretty poorly however.    He said no problems since however.  He continues to be symptom-free in terms of his a OSD with no fevers no rashes and no other symptoms.    October 3, 2020 interval history:    Since we have last seen the patient he has gone off of his anakinra and did so shortly after that appointment.  This was driven in part by concerns about what that may be going to cost him as he had an insurance change and was being given a co-pay cost that was in the thousands of dollars.    Interestingly as has happened at least once before now he was able to go off of the medicine with no recurrence of symptoms.  The pattern that he is now identified is that he seems to get flares of his AO SD every winter, gets good control of the disease with anakinra, and then later is able to go off of the anakinra with no recurrence of symptoms.  He has now been off of it at least 4 months and has had no rashes no fevers no joint symptoms and basically feels completely normal.    Physical examination by video shows him to be in no acute distress.  There is no apparent rash over his face forearms or hands and he has no evidence of joint swelling or joint deformity.    Impression:    Per the problem list.  We did discuss the possibility of doing labs but they have never been very informative in his case.  He does not have a history of having significant problems with transaminases.  I am not excited about chasing him in for labs accordingly.    I think instead we can simply wait and see what happens.  If he again gets a flare this winter he can again go back on anakinra which she has a good deal of in his refrigerator.  We will see whether he needs to get more of it and if he does make sure that he has access to an appropriate patient assistance plan to hopefully be able to afford it or we will need to look into alternative therapy.    I believe his follow-up on a as needed basis.  He  knows how to contact us if he does have a flare.  It is conceivable he will not have any more flares as well, and will not need ongoing follow-up for this condition.    This video visit commenced at 3:38 PM was completed at 3:53 PM, 15 minutes in duration over 50% in counseling.    .katerin Conte MD, PhD    Rheumatology

## 2020-11-03 NOTE — PROGRESS NOTES
"Jose Spears is a 35 year old male who is being evaluated via a billable video visit.      The patient has been notified of following:     \"This video visit will be conducted via a call between you and your physician/provider. We have found that certain health care needs can be provided without the need for an in-person physical exam.  This service lets us provide the care you need with a video conversation.  If a prescription is necessary we can send it directly to your pharmacy.  If lab work is needed we can place an order for that and you can then stop by our lab to have the test done at a later time.    Video visits are billed at different rates depending on your insurance coverage.  Please reach out to your insurance provider with any questions.    If during the course of the call the physician/provider feels a video visit is not appropriate, you will not be charged for this service.\"    Patient has given verbal consent for Video visit? Yes  How would you like to obtain your AVS? MyChart    Will anyone else be joining your video visit? No      Video-Visit Details    Type of service:  Video Visit    Originating Location (pt. Location): Home    Distant Location (provider location):  [facility] [place]     Platform used for Video Visit: [email or web address]     dave: Jose Spears  MRN: [id number]  Age: 34 year old  : 1985  Referring provider: Referred Self     Problem List:  Adult-onset Still's disease        Review of Systems:   Pertinent items are noted in HPI or as below, remainder of complete ROS is negative.       No recent problems with hearing or vision. No swallowing problems.   No breathing difficulty, shortness of breath, coughing, or wheezing.  No chest pain or palpitations.  No heart burn, indigestion, abdominal pain, nausea, vomiting, diarrhea.  No urination problems, no bloody, cloudy urine, no dysuria.  No numbing, tingling, weakness.  No headaches or " confusion.  No easy bleeding or bruising.      Active Medications:      Current Outpatient Medications:      anakinra (KINERET) 100 MG/0.67ML SOSY injection, Inject 0.67 mLs (100 mg) Subcutaneous daily Hold for signs of infection, then seek medical attention., Disp: 30 Syringe, Rfl: 5      Allergies:   Codeine and Morphine      Past Medical History:  Adult-onset Still's disease  Osteogenesis imperfecta  External hemorrhoids  Tobacco abuse     Past Surgical History:  Orthopedic surgery     Family History:   Osteogenesis imperfecta - father  Diabetes - maternal grandmother  Hypertension - maternal grandmother  Stroke - maternal grandmother  Diabetes - maternal aunt  Colorectal cancer - maternal grandfather      Social History:  The patient reports that he has been smoking cigarettes. He has been smoking about 1.00 pack per day. He has never used smokeless tobacco. He reports current alcohol use. He reports that he does not use drugs.   Marital Status: Single        4/21/2020 interval history:    Since we last seen him he has remained on the anakinra.  He tried going off for a couple of days after forgetting 1 dose and decided to see what happened.  He quickly got recurrence of his rashes however and resumed.    In about mid March she developed an episode where he got very weak diffusely achy tired with a cough congested and he felt like he might of had a fever although he did not have a thermometer at first and when he got 1 it did not prove to be a fever.  He went to the [facility] clinic and they screened him but did not do any testing for coronavirus.  He was sick for 4 to 5 days and then gradually improved.  Per his description he felt pretty poorly however.    He said no problems since however.  He continues to be symptom-free in terms of his a OSD with no fevers no rashes and no other symptoms.    October 3, 2020 interval history:    Since we have last seen the patient he has gone off of his anakinra  and did so shortly after that appointment.  This was driven in part by concerns about what that may be going to cost him as he had an insurance change and was being given a co-pay cost that was in the thousands of dollars.    Interestingly as has happened at least once before now he was able to go off of the medicine with no recurrence of symptoms.  The pattern that he is now identified is that he seems to get flares of his AO SD every winter, gets good control of the disease with anakinra, and then later is able to go off of the anakinra with no recurrence of symptoms.  He has now been off of it at least 4 months and has had no rashes no fevers no joint symptoms and basically feels completely normal.    Physical examination by video shows him to be in no acute distress.  There is no apparent rash over his face forearms or hands and he has no evidence of joint swelling or joint deformity.    Impression:    Per the problem list.  We did discuss the possibility of doing labs but they have never been very informative in his case.  He does not have a history of having significant problems with transaminases.  I am not excited about chasing him in for labs accordingly.    I think instead we can simply wait and see what happens.  If he again gets a flare this winter he can again go back on anakinra which she has a good deal of in his refrigerator.  We will see whether he needs to get more of it and if he does make sure that he has access to an appropriate patient assistance plan to hopefully be able to afford it or we will need to look into alternative therapy.    I believe his follow-up on a as needed basis.  He knows how to contact us if he does have a flare.  It is conceivable he will not have any more flares as well, and will not need ongoing follow-up for this condition.    This video visit commenced at 3:38 PM was completed at 3:53 PM, 15 minutes in duration over 50% in counseling.    .katerin Conte MD,  PhD    Rheumatology

## 2021-01-14 ENCOUNTER — HEALTH MAINTENANCE LETTER (OUTPATIENT)
Age: 36
End: 2021-01-14

## 2021-01-18 ENCOUNTER — MYC MEDICAL ADVICE (OUTPATIENT)
Dept: RHEUMATOLOGY | Facility: CLINIC | Age: 36
End: 2021-01-18

## 2021-01-19 NOTE — TELEPHONE ENCOUNTER
Called and spoke with pt regarding a possible flare of his still's disease.  Is having joint pain-hands and feet and weakness and fatigue that started on Friday of last week.     Pt is planning to restart Anakinra tonight and see what happens.  He says it does depend on how far along the flare is for how long it takes for the Anakinra to work. We discussed that per Dr. Conte's note, labs have never been particularily revealing for his case.  Pt agrees.      He would like to check in on Friday to see how things are going before the weekend, he is off that day.    Will update Dr. Conte.    Bria Gusman RN  Rheumatology Clinic

## 2021-01-22 NOTE — TELEPHONE ENCOUNTER
Called and spoke with pt, restarting the Anakrina helped to clear up the rashes and the fatigue. He notes that he seems to flare in the winter time, and continues until spring.  He is wondering if he can try to use it as needed rather than staying on it at this time?    Will ask Dr. Conte. Could he try being on it for a week and then going off or would you recommend a longer period.      He would like a my chart response.    Bria Gusman RN  Rheumatology Clinic

## 2021-01-23 NOTE — TELEPHONE ENCOUNTER
Given his observation that this tends to occur in the winter and then improved in the spring, as it has several times now, I would be inclined to say he should just stay on it until the spring.    If he does want to try using it on more of a as needed basis I would still have him try to take it at least 2 to 3 weeks before stopping it again.    If his symptoms come back then I think he should just go on it and stay on it until spring.    Frequently starting and stopping these kinds of injections risks the development of antibodies against the drug itself rendering it ineffective, so I do not think he should do this repeatedly

## 2021-01-25 NOTE — TELEPHONE ENCOUNTER
My chart message sent to [facility] with information below.    Bria Gusman RN  Rheumatology Clinic

## 2021-02-11 DIAGNOSIS — M08.20 STILL'S DISEASE (H): ICD-10-CM

## 2021-02-15 RX ORDER — ANAKINRA 100 MG/.67ML
100 INJECTION, SOLUTION SUBCUTANEOUS DAILY
Qty: 22 ML | Refills: 3 | Status: SHIPPED | OUTPATIENT
Start: 2021-02-15 | End: 2021-06-04

## 2021-02-15 NOTE — TELEPHONE ENCOUNTER
"   anakinra (KINERET) 100 MG/0.67ML SOSY injection  Last Written Prescription Date:  11/2/20  Last Fill Quantity: 30,   # refills: 1  Last Office Visit : 11/3/20  Future Office visit: none    \"  If he again gets a flare this winter he can again go back on anakinra which she has a good deal of in his refrigerator\"     Routing refill request to provider for review/approval because: gap in med rf. rf?  "

## 2021-06-03 DIAGNOSIS — M08.20 STILL'S DISEASE (H): ICD-10-CM

## 2021-06-04 RX ORDER — ANAKINRA 100 MG/.67ML
100 INJECTION, SOLUTION SUBCUTANEOUS DAILY
Qty: 66 ML | Refills: 3 | Status: SHIPPED | OUTPATIENT
Start: 2021-06-04 | End: 2022-06-02

## 2021-06-04 NOTE — TELEPHONE ENCOUNTER
anakinra (KINERET) 100 MG/0.67ML SOSY injection   Last Written Prescription Date:  2/15/2021-3/17/2021  Last Fill Quantity: 22,   # refills: 3  Last Office Visit : 11/3/2020  Future Office visit:  None    Routing refill request to provider for review/approval because:  Last order, had a start and stop date.  Continue this med?  Refer to Provider for review.      Namita Pathak RN  Central Triage Red Flags/Med Refills

## 2021-06-08 ENCOUNTER — TELEPHONE (OUTPATIENT)
Dept: RHEUMATOLOGY | Facility: CLINIC | Age: 36
End: 2021-06-08

## 2021-06-08 NOTE — TELEPHONE ENCOUNTER
[facility] Call Center    Phone Message    May a detailed message be left on voicemail: yes     Reason for Call: Medication Question or concern regarding medication   Prescription Clarification  Name of Medication: Kineret    Prescribing Provider: Dg   Pharmacy: [facility]   What on the order needs clarification? quantity          Action Taken: Message routed to:  [facility] Rheum    Travel Screening: Not Applicable

## 2021-06-08 NOTE — TELEPHONE ENCOUNTER
Returned call to pharmacy. Discussed that Dr Conte ordered a 3 mth supply of Kineret in mL and that the pt can have 3 additional refills. Pharmacist verbalized understanding.    ARTEM BuenoN, RN  Rheumatology Care Coordinator  [facility]

## 2021-10-24 ENCOUNTER — HEALTH MAINTENANCE LETTER (OUTPATIENT)
Age: 36
End: 2021-10-24

## 2021-12-22 ENCOUNTER — TELEPHONE (OUTPATIENT)
Dept: RHEUMATOLOGY | Facility: CLINIC | Age: 36
End: 2021-12-22
Payer: COMMERCIAL

## 2021-12-22 NOTE — TELEPHONE ENCOUNTER
PA Initiation    Medication: KINERET   Insurance Company: [employer] - Phone [phone] Fax [phone]  Pharmacy Filling the Rx: [facility] - [place], [place] - [facility] [address]  Filling Pharmacy Phone:    Filling Pharmacy Fax:    Start Date: 12/22/2021    JHONY LAZCANO (Bryan: [email or web address])

## 2021-12-23 NOTE — CONFIDENTIAL NOTE
Call received from [facility] wondering if a PA has been started on this medication, because if not, then they would initiate the Prior Authorization themselves.   Advised pharmacy that PA was just initiated yesterday, but we have not received a response back yet.   Pharmacy representative actually double-checked on pt's medication while we were still on the phone, and medication was able to be run through pt's insurance--so PA must have been successful.     Note: I'm not sure why the initial PA note indicated that the [facility] pharmacy would be the one filling this medication because the original prescription was sent to the [facility] and there's no documentation seen that would indicate this was supposed to be changed to the [facility]. The [facility] will continue to process and fill this medication for pt.

## 2021-12-23 NOTE — TELEPHONE ENCOUNTER
Prior Authorization Approval    Authorization Effective Date: 12/23/2021  Authorization Expiration Date: 12/22/2022  Medication: KINERET - Approved   Approved Dose/Quantity: 28 FOR 28 DAYS   Reference #:     Insurance Company: [employer] [place] - Phone [phone] Fax [phone]  Expected CoPay:       CoPay Card Available:      Foundation Assistance Needed:    Which Pharmacy is filling the prescription (Not needed for infusion/clinic administered): [facility] [address]  Pharmacy Notified:    Patient Notified:      * PER DOCUMENTATION BELOW, MEDICATION IS TO CONTINUE BEING FILLED AT [facility]

## 2022-02-13 ENCOUNTER — HEALTH MAINTENANCE LETTER (OUTPATIENT)
Age: 37
End: 2022-02-13

## 2022-06-01 DIAGNOSIS — M08.20 STILL'S DISEASE (H): ICD-10-CM

## 2022-06-01 NOTE — TELEPHONE ENCOUNTER
Last Office Visit:  11/3/20  Next Enc:  Visit date not found  Medication: Anakinra  Last given: 6/4/21  Qty: 28 with 3 refills   Lab: NA    Patient states he continues to use injections intermittently as needed. He reports his last dose was two weeks ago and he is currently asymptomatic.   Routing refill request to Dr. Conte.  Maryjane Plaza RN  [facility]

## 2022-06-01 NOTE — PLAN OF CARE
2453-1213: AVSS, Afebrile. Denies pain, n/v. Pt slept most of the night. Significant other, Donna is bedside. Continue w/ POC.     Products Recommended: EltaMD Detail Level: Generalized General Sunscreen Counseling: I recommended a broad spectrum sunscreen with a SPF of 30 or higher.  I explained that SPF 30 sunscreens block approximately 97 percent of the sun's harmful rays.  Sunscreens should be applied at least 15 minutes prior to expected sun exposure and then every 2 hours after that as long as sun exposure continues. If swimming or exercising sunscreen should be reapplied every 45 minutes to an hour after getting wet or sweating.  One ounce, or the equivalent of a shot glass full of sunscreen, is adequate to protect the skin not covered by a bathing suit. I also recommended a lip balm with a sunscreen as well. Sun protective clothing can be used in lieu of sunscreen but must be worn the entire time you are exposed to the sun's rays.

## 2022-06-02 RX ORDER — ANAKINRA 100 MG/.67ML
100 INJECTION, SOLUTION SUBCUTANEOUS DAILY
Qty: 66 ML | Refills: 3 | Status: SHIPPED | OUTPATIENT
Start: 2022-06-02 | End: 2023-06-03

## 2022-06-03 NOTE — TELEPHONE ENCOUNTER
Placed call to patient to offer a video appointment with Dr. Conte. Patient states he has new insurance plan through [employer] and is concerned that visit will not be covered under his new benefits. Explained that he will need to contact his insurance provider directly to determine if visits at [facility] are covered.  Maryjane Plaza RN  [facility]

## 2022-10-15 ENCOUNTER — HEALTH MAINTENANCE LETTER (OUTPATIENT)
Age: 37
End: 2022-10-15

## 2022-12-28 ENCOUNTER — OFFICE VISIT (OUTPATIENT)
Dept: FAMILY MEDICINE | Facility: CLINIC | Age: 37
End: 2022-12-28

## 2022-12-28 VITALS
HEART RATE: 61 BPM | BODY MASS INDEX: 29.85 KG/M2 | RESPIRATION RATE: 16 BRPM | WEIGHT: 213.2 LBS | SYSTOLIC BLOOD PRESSURE: 102 MMHG | DIASTOLIC BLOOD PRESSURE: 68 MMHG | HEIGHT: 71 IN | TEMPERATURE: 97.7 F | OXYGEN SATURATION: 99 %

## 2022-12-28 DIAGNOSIS — M06.1 ADULT-ONSET STILL'S DISEASE (H): Primary | ICD-10-CM

## 2022-12-28 DIAGNOSIS — Z00.00 ROUTINE GENERAL MEDICAL EXAMINATION AT A HEALTH CARE FACILITY: ICD-10-CM

## 2022-12-28 DIAGNOSIS — Z13.220 SCREENING FOR HYPERLIPIDEMIA: ICD-10-CM

## 2022-12-28 PROCEDURE — 99395 PREV VISIT EST AGE 18-39: CPT | Performed by: FAMILY MEDICINE

## 2022-12-28 PROCEDURE — 80061 LIPID PANEL: CPT | Performed by: FAMILY MEDICINE

## 2022-12-28 PROCEDURE — 36415 COLL VENOUS BLD VENIPUNCTURE: CPT | Performed by: FAMILY MEDICINE

## 2022-12-28 PROCEDURE — 80053 COMPREHEN METABOLIC PANEL: CPT | Performed by: FAMILY MEDICINE

## 2022-12-28 SDOH — ECONOMIC STABILITY: INCOME INSECURITY: IN THE LAST 12 MONTHS, WAS THERE A TIME WHEN YOU WERE NOT ABLE TO PAY THE MORTGAGE OR RENT ON TIME?: NO

## 2022-12-28 SDOH — ECONOMIC STABILITY: INCOME INSECURITY: HOW HARD IS IT FOR YOU TO PAY FOR THE VERY BASICS LIKE FOOD, HOUSING, MEDICAL CARE, AND HEATING?: NOT HARD AT ALL

## 2022-12-28 SDOH — HEALTH STABILITY: PHYSICAL HEALTH: ON AVERAGE, HOW MANY DAYS PER WEEK DO YOU ENGAGE IN MODERATE TO STRENUOUS EXERCISE (LIKE A BRISK WALK)?: 5 DAYS

## 2022-12-28 SDOH — ECONOMIC STABILITY: FOOD INSECURITY: WITHIN THE PAST 12 MONTHS, THE FOOD YOU BOUGHT JUST DIDN'T LAST AND YOU DIDN'T HAVE MONEY TO GET MORE.: NEVER TRUE

## 2022-12-28 SDOH — HEALTH STABILITY: PHYSICAL HEALTH: ON AVERAGE, HOW MANY MINUTES DO YOU ENGAGE IN EXERCISE AT THIS LEVEL?: 80 MIN

## 2022-12-28 SDOH — ECONOMIC STABILITY: FOOD INSECURITY: WITHIN THE PAST 12 MONTHS, YOU WORRIED THAT YOUR FOOD WOULD RUN OUT BEFORE YOU GOT MONEY TO BUY MORE.: NEVER TRUE

## 2022-12-28 SDOH — ECONOMIC STABILITY: TRANSPORTATION INSECURITY
IN THE PAST 12 MONTHS, HAS THE LACK OF TRANSPORTATION KEPT YOU FROM MEDICAL APPOINTMENTS OR FROM GETTING MEDICATIONS?: NO

## 2022-12-28 SDOH — ECONOMIC STABILITY: TRANSPORTATION INSECURITY
IN THE PAST 12 MONTHS, HAS LACK OF TRANSPORTATION KEPT YOU FROM MEETINGS, WORK, OR FROM GETTING THINGS NEEDED FOR DAILY LIVING?: NO

## 2022-12-28 ASSESSMENT — ENCOUNTER SYMPTOMS
DYSURIA: 0
NERVOUS/ANXIOUS: 0
DIARRHEA: 0
JOINT SWELLING: 0
ABDOMINAL PAIN: 0
NAUSEA: 0
SHORTNESS OF BREATH: 0
PARESTHESIAS: 0
HEADACHES: 0
ARTHRALGIAS: 0
CONSTIPATION: 0
DIZZINESS: 0
HEMATURIA: 0
PALPITATIONS: 0
FREQUENCY: 0
HEARTBURN: 0
SORE THROAT: 0
WEAKNESS: 0
EYE PAIN: 0
MYALGIAS: 0
HEMATOCHEZIA: 0
FEVER: 0
CHILLS: 0
COUGH: 0

## 2022-12-28 ASSESSMENT — SOCIAL DETERMINANTS OF HEALTH (SDOH)
DO YOU BELONG TO ANY CLUBS OR ORGANIZATIONS SUCH AS CHURCH GROUPS UNIONS, FRATERNAL OR ATHLETIC GROUPS, OR SCHOOL GROUPS?: YES
HOW OFTEN DO YOU GET TOGETHER WITH FRIENDS OR RELATIVES?: TWICE A WEEK
IN A TYPICAL WEEK, HOW MANY TIMES DO YOU TALK ON THE PHONE WITH FAMILY, FRIENDS, OR NEIGHBORS?: MORE THAN THREE TIMES A WEEK
HOW OFTEN DO YOU ATTEND CHURCH OR RELIGIOUS SERVICES?: NEVER

## 2022-12-28 ASSESSMENT — LIFESTYLE VARIABLES
HOW OFTEN DO YOU HAVE SIX OR MORE DRINKS ON ONE OCCASION: LESS THAN MONTHLY
AUDIT-C TOTAL SCORE: 4
SKIP TO QUESTIONS 9-10: 0
HOW OFTEN DO YOU HAVE A DRINK CONTAINING ALCOHOL: 2-4 TIMES A MONTH
HOW MANY STANDARD DRINKS CONTAINING ALCOHOL DO YOU HAVE ON A TYPICAL DAY: 3 OR 4

## 2022-12-28 ASSESSMENT — PAIN SCALES - GENERAL: PAINLEVEL: NO PAIN (0)

## 2022-12-28 NOTE — PROGRESS NOTES
SUBJECTIVE:   CC: Jose is an 37 year old who presents for preventative health visit.       Healthy Habits:     Getting at least 3 servings of Calcium per day:  NO    Bi-annual eye exam:  Yes    Dental care twice a year:  Yes    Sleep apnea or symptoms of sleep apnea:  None    Diet:  Regular (no restrictions)    Frequency of exercise:  2-3 days/week    Duration of exercise:  30-45 minutes    Taking medications regularly:  Yes    Medication side effects:  None    PHQ-2 Total Score: 0    Additional concerns today:  No        Today's PHQ-2 Score:   PHQ-2 (  [employer]) 2022   Q1: Little interest or pleasure in doing things 0   Q2: Feeling down, depressed or hopeless 0   PHQ-2 Score 0   PHQ-2 Total Score (12-17 Years)- Positive if 3 or more points; Administer PHQ-A if positive -   Q1: Little interest or pleasure in doing things Not at all   Q2: Feeling down, depressed or hopeless Not at all   PHQ-2 Score 0           Social History     Tobacco Use     Smoking status: Former     Packs/day: 1.00     Types: Cigarettes     Quit date: 2022     Years since quittin.4     Smokeless tobacco: Never   Substance Use Topics     Alcohol use: Yes     Comment: occasionally         Alcohol Use 2022   Prescreen: >3 drinks/day or >7 drinks/week? No   Prescreen: >3 drinks/day or >7 drinks/week? -       Last PSA: No results found for: PSA    Reviewed orders with patient. Reviewed health maintenance and updated orders accordingly - Yes      Reviewed and updated as needed this visit by clinical staff   Tobacco  Allergies  Meds     [facility]          Reviewed and updated as needed this visit by Provider     Meds     [facility]             Review of Systems   Constitutional: Negative for chills and fever.   HENT: Positive for hearing loss. Negative for congestion, ear pain and sore throat.    Eyes: Negative for pain and visual disturbance.   Respiratory: Negative for cough and shortness of breath.    Cardiovascular: Negative  "for chest pain, palpitations and peripheral edema.   Gastrointestinal: Negative for abdominal pain, constipation, diarrhea, heartburn, hematochezia and nausea.   Genitourinary: Negative for dysuria, frequency, genital sores, hematuria, impotence, penile discharge and urgency.   Musculoskeletal: Negative for arthralgias, joint swelling and myalgias.   Skin: Negative for rash.   Neurological: Negative for dizziness, weakness, headaches and paresthesias.   Psychiatric/Behavioral: Negative for mood changes. The patient is not nervous/anxious.      Works in metal fabrication. Works overnights.       OBJECTIVE:   /68 (BP Location: Right arm, Patient Position: Sitting, Cuff Size: Adult Large)   Pulse 61   Temp 97.7  F (36.5  C) (Oral)   Resp 16   Ht 1.803 m (5' 11\")   Wt 96.7 kg (213 lb 3.2 oz)   SpO2 99%   BMI 29.74 kg/m      Physical Exam  General: Vital signs reviewed.  Patient is in no acute appearing distress.  Breathing appears nonlabored.  Patient is alert and oriented ×3.      ENT: Ear exam shows bilateral tympanic membranes to be clear without injection, nasal turbinates show no injection or edema, no pharyngeal injection or exudate.    Neck: supple with no adenoapthy, palpable abnormal masses, or thyroid abnormality.    Eyes: No scleral, lid, or periorbital injection or edema noted.  No eye mattering noted.  Corneas are clear. Pupils are equal round and reactive to light with normal consensual eye movement.    Heart: Heart rate is regular without murmur.    Lungs: Lungs are clear to auscultation with good airflow bilaterally.    Abdomen:  Abdomen is soft, nontender.  No palpable abnormal masses or organomegaly.  Bowel sounds are normal.    Genital exam: Patient declined exam for possible hernia.    Back: No areas of tenderness.    Skin: Warm and dry, with no rash or abnormal lesions noted.    Extremities: No lower leg edema noted.  No joint edema or restricted range of motion noted.    Neuro: No " "acute focal deficits or other abnormalities noted.    Psych: Patient is very pleasant, making good eye contact, with clear and fluent speech.  Answers questions appropriately. No psychomotor agitation.         ASSESSMENT/PLAN:   Jose was seen today for physical.    Diagnoses and all orders for this visit:  See after visit summary and result note from studies for helpful information and advice given to patient.    Adult-onset Still's disease (H)    Routine general medical examination at a health care facility  -     Comprehensive metabolic panel  -     Cancel: Lipid Profile  -     Lipid Profile    Screening for hyperlipidemia  -     Cancel: Lipid Profile  -     Lipid Profile    Adult-onset Still's disease (H)  Other orders  -     REVIEW OF HEALTH MAINTENANCE PROTOCOL ORDERS              COUNSELING:   Reviewed preventive health counseling, as reflected in patient instructions      BMI:   Estimated body mass index is 29.74 kg/m  as calculated from the following:    Height as of this encounter: 1.803 m (5' 11\").    Weight as of this encounter: 96.7 kg (213 lb 3.2 oz).         He reports that he quit smoking about 4 months ago. His smoking use included cigarettes. He smoked an average of 1 pack per day. He has never used smokeless tobacco.            Tan Downs, DO  [facility]  "

## 2022-12-29 LAB
ALBUMIN SERPL BCG-MCNC: 4.5 G/DL (ref 3.5–5.2)
ALP SERPL-CCNC: 79 U/L (ref 40–129)
ALT SERPL W P-5'-P-CCNC: 43 U/L (ref 10–50)
ANION GAP SERPL CALCULATED.3IONS-SCNC: 13 MMOL/L (ref 7–15)
AST SERPL W P-5'-P-CCNC: 32 U/L (ref 10–50)
BILIRUB SERPL-MCNC: 0.4 MG/DL
BUN SERPL-MCNC: 16.1 MG/DL (ref 6–20)
CALCIUM SERPL-MCNC: 9.9 MG/DL (ref 8.6–10)
CHLORIDE SERPL-SCNC: 100 MMOL/L (ref 98–107)
CHOLEST SERPL-MCNC: 194 MG/DL
CREAT SERPL-MCNC: 0.89 MG/DL (ref 0.67–1.17)
DEPRECATED HCO3 PLAS-SCNC: 23 MMOL/L (ref 22–29)
GFR SERPL CREATININE-BSD FRML MDRD: >90 ML/MIN/1.73M2
GLUCOSE SERPL-MCNC: 98 MG/DL (ref 70–99)
HDLC SERPL-MCNC: 46 MG/DL
LDLC SERPL CALC-MCNC: 122 MG/DL
NONHDLC SERPL-MCNC: 148 MG/DL
POTASSIUM SERPL-SCNC: 4.8 MMOL/L (ref 3.4–5.3)
PROT SERPL-MCNC: 6.9 G/DL (ref 6.4–8.3)
SODIUM SERPL-SCNC: 136 MMOL/L (ref 136–145)
TRIGL SERPL-MCNC: 129 MG/DL

## 2023-02-16 ENCOUNTER — TELEPHONE (OUTPATIENT)
Dept: RHEUMATOLOGY | Facility: CLINIC | Age: 38
End: 2023-02-16

## 2023-02-16 NOTE — TELEPHONE ENCOUNTER
[facility] Call Center    Phone Message    May a detailed message be left on voicemail: yes     Reason for Call: Medication Question or concern regarding medication   Name of Medication: Kineret  Prescribing Provider: VIOSO   Pharmacy: [facility]   What on the order needs clarification? Needs new Prior Authorization    Action Taken: Message routed to:  [facility]    Travel Screening: Not Applicable

## 2023-02-27 NOTE — TELEPHONE ENCOUNTER
Prior Authorization Approval    Authorization Effective Date:    Authorization Expiration Date: 2/21/2024  Medication: Kineret  Approved Dose/Quantity:   Reference #: [id number]   Insurance Company: [employer] [place] - Phone [phone] Fax [phone]  Expected CoPay:       CoPay Card Available:      Foundation Assistance Needed:    Which Pharmacy is filling the prescription (Not needed for infusion/clinic administered): [facility] [address]  Pharmacy Notified: Yes  Patient Notified: Yes

## 2023-05-22 DIAGNOSIS — M08.20 STILL'S DISEASE (H): ICD-10-CM

## 2023-05-25 NOTE — TELEPHONE ENCOUNTER
Medication/Dose: anakinra (KINERET) 100 MG/0.67ML SOSY injection     Last Written : 6/2/22  Last Quantity: 66 mL, # refills: 3  Last Office Visit :  11/3/20  Pending appointment: None on file    Prescription did not meet protocol, and routed to provider    FRANKLYN Bueno, RN  [facility] Refill Team

## 2023-06-03 RX ORDER — ANAKINRA 100 MG/.67ML
100 INJECTION, SOLUTION SUBCUTANEOUS DAILY
Qty: 66 ML | Refills: 3 | Status: SHIPPED | OUTPATIENT
Start: 2023-06-03

## 2023-10-24 NOTE — TELEPHONE ENCOUNTER
Bed: 38  Expected date:   Expected time:   Means of arrival:   Comments:  EMS   PA Initiation    Medication: Kineret  Insurance Company: [employer] [place] - Phone [phone] Fax [phone]  Pharmacy Filling the Rx: [facility] [place], [place] - [address]  Filling Pharmacy Phone:    Filling Pharmacy Fax:    Start Date: 2/21/2023    [id number]

## 2024-03-17 ENCOUNTER — HEALTH MAINTENANCE LETTER (OUTPATIENT)
Age: 39
End: 2024-03-17

## 2025-03-22 ENCOUNTER — HEALTH MAINTENANCE LETTER (OUTPATIENT)
Age: 40
End: 2025-03-22